# Patient Record
Sex: MALE | HISPANIC OR LATINO | Employment: UNEMPLOYED | URBAN - METROPOLITAN AREA
[De-identification: names, ages, dates, MRNs, and addresses within clinical notes are randomized per-mention and may not be internally consistent; named-entity substitution may affect disease eponyms.]

---

## 2018-04-04 ENCOUNTER — HOSPITAL ENCOUNTER (EMERGENCY)
Facility: MEDICAL CENTER | Age: 33
End: 2018-04-04

## 2018-04-04 ENCOUNTER — APPOINTMENT (OUTPATIENT)
Dept: RADIOLOGY | Facility: MEDICAL CENTER | Age: 33
End: 2018-04-04

## 2018-04-04 VITALS
HEART RATE: 92 BPM | SYSTOLIC BLOOD PRESSURE: 143 MMHG | RESPIRATION RATE: 14 BRPM | BODY MASS INDEX: 30.93 KG/M2 | WEIGHT: 197.09 LBS | OXYGEN SATURATION: 97 % | TEMPERATURE: 97.1 F | HEIGHT: 67 IN | DIASTOLIC BLOOD PRESSURE: 97 MMHG

## 2018-04-04 LAB
ALBUMIN SERPL BCP-MCNC: 4.4 G/DL (ref 3.2–4.9)
ALBUMIN/GLOB SERPL: 1.3 G/DL
ALP SERPL-CCNC: 87 U/L (ref 30–99)
ALT SERPL-CCNC: 50 U/L (ref 2–50)
ANION GAP SERPL CALC-SCNC: 10 MMOL/L (ref 0–11.9)
APTT PPP: 26.5 SEC (ref 24.7–36)
AST SERPL-CCNC: 26 U/L (ref 12–45)
BASOPHILS # BLD AUTO: 0.7 % (ref 0–1.8)
BASOPHILS # BLD: 0.08 K/UL (ref 0–0.12)
BILIRUB SERPL-MCNC: 0.4 MG/DL (ref 0.1–1.5)
BNP SERPL-MCNC: 13 PG/ML (ref 0–100)
BUN SERPL-MCNC: 9 MG/DL (ref 8–22)
CALCIUM SERPL-MCNC: 9.9 MG/DL (ref 8.5–10.5)
CHLORIDE SERPL-SCNC: 106 MMOL/L (ref 96–112)
CO2 SERPL-SCNC: 21 MMOL/L (ref 20–33)
CREAT SERPL-MCNC: 0.89 MG/DL (ref 0.5–1.4)
EKG IMPRESSION: NORMAL
EOSINOPHIL # BLD AUTO: 0.21 K/UL (ref 0–0.51)
EOSINOPHIL NFR BLD: 1.8 % (ref 0–6.9)
ERYTHROCYTE [DISTWIDTH] IN BLOOD BY AUTOMATED COUNT: 43 FL (ref 35.9–50)
GLOBULIN SER CALC-MCNC: 3.3 G/DL (ref 1.9–3.5)
GLUCOSE SERPL-MCNC: 85 MG/DL (ref 65–99)
HCT VFR BLD AUTO: 47.6 % (ref 42–52)
HGB BLD-MCNC: 16.6 G/DL (ref 14–18)
IMM GRANULOCYTES # BLD AUTO: 0.05 K/UL (ref 0–0.11)
IMM GRANULOCYTES NFR BLD AUTO: 0.4 % (ref 0–0.9)
INR PPP: 1 (ref 0.87–1.13)
LIPASE SERPL-CCNC: 30 U/L (ref 11–82)
LYMPHOCYTES # BLD AUTO: 4.34 K/UL (ref 1–4.8)
LYMPHOCYTES NFR BLD: 36.9 % (ref 22–41)
MCH RBC QN AUTO: 32.3 PG (ref 27–33)
MCHC RBC AUTO-ENTMCNC: 34.9 G/DL (ref 33.7–35.3)
MCV RBC AUTO: 92.6 FL (ref 81.4–97.8)
MONOCYTES # BLD AUTO: 0.87 K/UL (ref 0–0.85)
MONOCYTES NFR BLD AUTO: 7.4 % (ref 0–13.4)
NEUTROPHILS # BLD AUTO: 6.21 K/UL (ref 1.82–7.42)
NEUTROPHILS NFR BLD: 52.8 % (ref 44–72)
NRBC # BLD AUTO: 0 K/UL
NRBC BLD-RTO: 0 /100 WBC
PLATELET # BLD AUTO: 285 K/UL (ref 164–446)
PMV BLD AUTO: 12.4 FL (ref 9–12.9)
POTASSIUM SERPL-SCNC: 3.7 MMOL/L (ref 3.6–5.5)
PROT SERPL-MCNC: 7.7 G/DL (ref 6–8.2)
PROTHROMBIN TIME: 12.9 SEC (ref 12–14.6)
RBC # BLD AUTO: 5.14 M/UL (ref 4.7–6.1)
SODIUM SERPL-SCNC: 137 MMOL/L (ref 135–145)
TROPONIN I SERPL-MCNC: <0.01 NG/ML (ref 0–0.04)
WBC # BLD AUTO: 11.8 K/UL (ref 4.8–10.8)

## 2018-04-04 PROCEDURE — 93005 ELECTROCARDIOGRAM TRACING: CPT

## 2018-04-04 PROCEDURE — 85025 COMPLETE CBC W/AUTO DIFF WBC: CPT

## 2018-04-04 PROCEDURE — 80053 COMPREHEN METABOLIC PANEL: CPT

## 2018-04-04 PROCEDURE — 85610 PROTHROMBIN TIME: CPT

## 2018-04-04 PROCEDURE — 302449 STATCHG TRIAGE ONLY (STATISTIC)

## 2018-04-04 PROCEDURE — 85730 THROMBOPLASTIN TIME PARTIAL: CPT

## 2018-04-04 PROCEDURE — 83690 ASSAY OF LIPASE: CPT

## 2018-04-04 PROCEDURE — 84484 ASSAY OF TROPONIN QUANT: CPT

## 2018-04-04 PROCEDURE — 83880 ASSAY OF NATRIURETIC PEPTIDE: CPT

## 2018-04-04 ASSESSMENT — PAIN SCALES - GENERAL: PAINLEVEL_OUTOF10: 6

## 2018-04-05 NOTE — ED TRIAGE NOTES
"  Chief Complaint   Patient presents with   • Chest Pain     Starting a couple hours ago.  Left sided radiating down left arm.    • Blurred Vision     Ambulatory to triage for above.  Patient states the he was unable to take BP medications r/t cost of picking up meds for two days, started taking again today.     Patient does not want ASA r/t gastritis.     Protocol initiated.  Explained wait time and triage process to pt. Pt placed back out in lobby, told to notify ED tech or triage RN of any changes, verbalized understanding.    ./97   Pulse 92   Temp 36.2 °C (97.1 °F)   Resp 14   Ht 1.702 m (5' 7\")   Wt 89.4 kg (197 lb 1.5 oz)   SpO2 97%   BMI 30.87 kg/m²       "

## 2018-04-23 ENCOUNTER — HOSPITAL ENCOUNTER (EMERGENCY)
Facility: MEDICAL CENTER | Age: 33
End: 2018-04-23

## 2018-04-23 VITALS
RESPIRATION RATE: 17 BRPM | OXYGEN SATURATION: 94 % | HEART RATE: 87 BPM | DIASTOLIC BLOOD PRESSURE: 95 MMHG | TEMPERATURE: 98.5 F | HEIGHT: 71 IN | SYSTOLIC BLOOD PRESSURE: 133 MMHG

## 2018-04-23 LAB
ALBUMIN SERPL BCP-MCNC: 4.6 G/DL (ref 3.2–4.9)
ALBUMIN/GLOB SERPL: 1.4 G/DL
ALP SERPL-CCNC: 94 U/L (ref 30–99)
ALT SERPL-CCNC: 48 U/L (ref 2–50)
ANION GAP SERPL CALC-SCNC: 12 MMOL/L (ref 0–11.9)
APPEARANCE UR: CLEAR
AST SERPL-CCNC: 23 U/L (ref 12–45)
BASOPHILS # BLD AUTO: 0.4 % (ref 0–1.8)
BASOPHILS # BLD: 0.06 K/UL (ref 0–0.12)
BILIRUB SERPL-MCNC: 0.5 MG/DL (ref 0.1–1.5)
BILIRUB UR QL STRIP.AUTO: NEGATIVE
BUN SERPL-MCNC: 13 MG/DL (ref 8–22)
CALCIUM SERPL-MCNC: 9.9 MG/DL (ref 8.5–10.5)
CHLORIDE SERPL-SCNC: 108 MMOL/L (ref 96–112)
CO2 SERPL-SCNC: 20 MMOL/L (ref 20–33)
COLOR UR: YELLOW
CREAT SERPL-MCNC: 0.92 MG/DL (ref 0.5–1.4)
EOSINOPHIL # BLD AUTO: 0.15 K/UL (ref 0–0.51)
EOSINOPHIL NFR BLD: 1.1 % (ref 0–6.9)
ERYTHROCYTE [DISTWIDTH] IN BLOOD BY AUTOMATED COUNT: 42.6 FL (ref 35.9–50)
GLOBULIN SER CALC-MCNC: 3.4 G/DL (ref 1.9–3.5)
GLUCOSE SERPL-MCNC: 98 MG/DL (ref 65–99)
GLUCOSE UR STRIP.AUTO-MCNC: NEGATIVE MG/DL
HCT VFR BLD AUTO: 49.7 % (ref 42–52)
HGB BLD-MCNC: 16.9 G/DL (ref 14–18)
IMM GRANULOCYTES # BLD AUTO: 0.07 K/UL (ref 0–0.11)
IMM GRANULOCYTES NFR BLD AUTO: 0.5 % (ref 0–0.9)
INR PPP: 1 (ref 0.87–1.13)
KETONES UR STRIP.AUTO-MCNC: NEGATIVE MG/DL
LEUKOCYTE ESTERASE UR QL STRIP.AUTO: NEGATIVE
LIPASE SERPL-CCNC: 53 U/L (ref 11–82)
LYMPHOCYTES # BLD AUTO: 3.69 K/UL (ref 1–4.8)
LYMPHOCYTES NFR BLD: 27.3 % (ref 22–41)
MCH RBC QN AUTO: 31.5 PG (ref 27–33)
MCHC RBC AUTO-ENTMCNC: 34 G/DL (ref 33.7–35.3)
MCV RBC AUTO: 92.6 FL (ref 81.4–97.8)
MICRO URNS: NORMAL
MONOCYTES # BLD AUTO: 0.87 K/UL (ref 0–0.85)
MONOCYTES NFR BLD AUTO: 6.4 % (ref 0–13.4)
NEUTROPHILS # BLD AUTO: 8.69 K/UL (ref 1.82–7.42)
NEUTROPHILS NFR BLD: 64.3 % (ref 44–72)
NITRITE UR QL STRIP.AUTO: NEGATIVE
NRBC # BLD AUTO: 0 K/UL
NRBC BLD-RTO: 0 /100 WBC
PH UR STRIP.AUTO: 5 [PH]
PLATELET # BLD AUTO: 274 K/UL (ref 164–446)
PMV BLD AUTO: 11.8 FL (ref 9–12.9)
POTASSIUM SERPL-SCNC: 3.8 MMOL/L (ref 3.6–5.5)
PROT SERPL-MCNC: 8 G/DL (ref 6–8.2)
PROT UR QL STRIP: NEGATIVE MG/DL
PROTHROMBIN TIME: 12.9 SEC (ref 12–14.6)
RBC # BLD AUTO: 5.37 M/UL (ref 4.7–6.1)
RBC UR QL AUTO: NEGATIVE
SODIUM SERPL-SCNC: 140 MMOL/L (ref 135–145)
SP GR UR STRIP.AUTO: 1.02
UROBILINOGEN UR STRIP.AUTO-MCNC: 0.2 MG/DL
WBC # BLD AUTO: 13.5 K/UL (ref 4.8–10.8)

## 2018-04-23 PROCEDURE — 81003 URINALYSIS AUTO W/O SCOPE: CPT

## 2018-04-23 PROCEDURE — 302449 STATCHG TRIAGE ONLY (STATISTIC)

## 2018-04-23 PROCEDURE — 85610 PROTHROMBIN TIME: CPT

## 2018-04-23 PROCEDURE — 83690 ASSAY OF LIPASE: CPT

## 2018-04-23 PROCEDURE — 80053 COMPREHEN METABOLIC PANEL: CPT

## 2018-04-23 PROCEDURE — 36415 COLL VENOUS BLD VENIPUNCTURE: CPT

## 2018-04-23 PROCEDURE — 85025 COMPLETE CBC W/AUTO DIFF WBC: CPT

## 2018-04-23 NOTE — ED TRIAGE NOTES
Patient ambulatory to ED triage with complaints of blood in avitia, lightheadedness, fatigue and liver disease. Was told to come to ED by primary MD a few days about but that he was feeling better. States when he spits there has been blood present. Blood in not when he is coughing. He denies n/v and reports no usual abdominal pain. He is alert and awake. Not uncomfortable appearing in triage.     Pt educated on ED process and asked to wait in lobby. Patient educated on importance of alerting staff to new or worsening symptoms or concerns.

## 2018-05-12 ENCOUNTER — APPOINTMENT (OUTPATIENT)
Dept: RADIOLOGY | Facility: MEDICAL CENTER | Age: 33
End: 2018-05-12
Attending: EMERGENCY MEDICINE
Payer: MEDICAID

## 2018-05-12 ENCOUNTER — HOSPITAL ENCOUNTER (EMERGENCY)
Facility: MEDICAL CENTER | Age: 33
End: 2018-05-13
Attending: EMERGENCY MEDICINE
Payer: MEDICAID

## 2018-05-12 DIAGNOSIS — R55 SYNCOPE, UNSPECIFIED SYNCOPE TYPE: ICD-10-CM

## 2018-05-12 DIAGNOSIS — I10 HYPERTENSION, UNSPECIFIED TYPE: ICD-10-CM

## 2018-05-12 LAB
ALBUMIN SERPL BCP-MCNC: 4.6 G/DL (ref 3.2–4.9)
ALBUMIN/GLOB SERPL: 1.8 G/DL
ALP SERPL-CCNC: 85 U/L (ref 30–99)
ALT SERPL-CCNC: 39 U/L (ref 2–50)
AMMONIA PLAS-SCNC: 44 UMOL/L (ref 11–45)
ANION GAP SERPL CALC-SCNC: 10 MMOL/L (ref 0–11.9)
AST SERPL-CCNC: 22 U/L (ref 12–45)
BASOPHILS # BLD AUTO: 0.4 % (ref 0–1.8)
BASOPHILS # BLD: 0.06 K/UL (ref 0–0.12)
BILIRUB SERPL-MCNC: 0.5 MG/DL (ref 0.1–1.5)
BUN SERPL-MCNC: 11 MG/DL (ref 8–22)
CALCIUM SERPL-MCNC: 9.7 MG/DL (ref 8.5–10.5)
CHLORIDE SERPL-SCNC: 108 MMOL/L (ref 96–112)
CO2 SERPL-SCNC: 20 MMOL/L (ref 20–33)
CREAT SERPL-MCNC: 0.96 MG/DL (ref 0.5–1.4)
CRP SERPL HS-MCNC: 0.29 MG/DL (ref 0–0.75)
DEPRECATED D DIMER PPP IA-ACNC: <200 NG/ML(D-DU)
EKG IMPRESSION: NORMAL
EOSINOPHIL # BLD AUTO: 0.07 K/UL (ref 0–0.51)
EOSINOPHIL NFR BLD: 0.5 % (ref 0–6.9)
ERYTHROCYTE [DISTWIDTH] IN BLOOD BY AUTOMATED COUNT: 41.1 FL (ref 35.9–50)
GLOBULIN SER CALC-MCNC: 2.6 G/DL (ref 1.9–3.5)
GLUCOSE SERPL-MCNC: 125 MG/DL (ref 65–99)
HCT VFR BLD AUTO: 44.8 % (ref 42–52)
HGB BLD-MCNC: 15.3 G/DL (ref 14–18)
IMM GRANULOCYTES # BLD AUTO: 0.07 K/UL (ref 0–0.11)
IMM GRANULOCYTES NFR BLD AUTO: 0.5 % (ref 0–0.9)
LIPASE SERPL-CCNC: 54 U/L (ref 11–82)
LYMPHOCYTES # BLD AUTO: 2.09 K/UL (ref 1–4.8)
LYMPHOCYTES NFR BLD: 14.6 % (ref 22–41)
MCH RBC QN AUTO: 31 PG (ref 27–33)
MCHC RBC AUTO-ENTMCNC: 34.2 G/DL (ref 33.7–35.3)
MCV RBC AUTO: 90.9 FL (ref 81.4–97.8)
MONOCYTES # BLD AUTO: 0.71 K/UL (ref 0–0.85)
MONOCYTES NFR BLD AUTO: 4.9 % (ref 0–13.4)
NEUTROPHILS # BLD AUTO: 11.35 K/UL (ref 1.82–7.42)
NEUTROPHILS NFR BLD: 79.1 % (ref 44–72)
NRBC # BLD AUTO: 0 K/UL
NRBC BLD-RTO: 0 /100 WBC
PLATELET # BLD AUTO: 251 K/UL (ref 164–446)
PMV BLD AUTO: 12.3 FL (ref 9–12.9)
POTASSIUM SERPL-SCNC: 3.4 MMOL/L (ref 3.6–5.5)
PROT SERPL-MCNC: 7.2 G/DL (ref 6–8.2)
RBC # BLD AUTO: 4.93 M/UL (ref 4.7–6.1)
SODIUM SERPL-SCNC: 138 MMOL/L (ref 135–145)
TROPONIN I SERPL-MCNC: <0.01 NG/ML (ref 0–0.04)
WBC # BLD AUTO: 14.4 K/UL (ref 4.8–10.8)

## 2018-05-12 PROCEDURE — 71045 X-RAY EXAM CHEST 1 VIEW: CPT

## 2018-05-12 PROCEDURE — 84484 ASSAY OF TROPONIN QUANT: CPT

## 2018-05-12 PROCEDURE — 85379 FIBRIN DEGRADATION QUANT: CPT

## 2018-05-12 PROCEDURE — 93005 ELECTROCARDIOGRAM TRACING: CPT | Performed by: EMERGENCY MEDICINE

## 2018-05-12 PROCEDURE — 82140 ASSAY OF AMMONIA: CPT

## 2018-05-12 PROCEDURE — 99285 EMERGENCY DEPT VISIT HI MDM: CPT

## 2018-05-12 PROCEDURE — 93005 ELECTROCARDIOGRAM TRACING: CPT

## 2018-05-12 PROCEDURE — 700105 HCHG RX REV CODE 258: Performed by: EMERGENCY MEDICINE

## 2018-05-12 PROCEDURE — 85025 COMPLETE CBC W/AUTO DIFF WBC: CPT

## 2018-05-12 PROCEDURE — 36415 COLL VENOUS BLD VENIPUNCTURE: CPT

## 2018-05-12 PROCEDURE — 80053 COMPREHEN METABOLIC PANEL: CPT

## 2018-05-12 PROCEDURE — 83690 ASSAY OF LIPASE: CPT

## 2018-05-12 PROCEDURE — 96360 HYDRATION IV INFUSION INIT: CPT

## 2018-05-12 PROCEDURE — 85652 RBC SED RATE AUTOMATED: CPT

## 2018-05-12 PROCEDURE — 86140 C-REACTIVE PROTEIN: CPT

## 2018-05-12 RX ORDER — SODIUM CHLORIDE 9 MG/ML
1000 INJECTION, SOLUTION INTRAVENOUS ONCE
Status: COMPLETED | OUTPATIENT
Start: 2018-05-12 | End: 2018-05-12

## 2018-05-12 RX ORDER — LACTULOSE 10 G/15ML
20 SOLUTION ORAL 2 TIMES DAILY
COMMUNITY
End: 2019-05-21 | Stop reason: CLARIF

## 2018-05-12 RX ORDER — SERTRALINE HYDROCHLORIDE 100 MG/1
100 TABLET, FILM COATED ORAL DAILY
COMMUNITY
End: 2019-05-21 | Stop reason: CLARIF

## 2018-05-12 RX ORDER — QUETIAPINE FUMARATE 100 MG/1
100 TABLET, FILM COATED ORAL 2 TIMES DAILY
COMMUNITY
End: 2019-05-21 | Stop reason: CLARIF

## 2018-05-12 RX ORDER — GABAPENTIN 300 MG/1
300 CAPSULE ORAL 3 TIMES DAILY
COMMUNITY
End: 2019-05-21 | Stop reason: CLARIF

## 2018-05-12 RX ADMIN — SODIUM CHLORIDE 1000 ML: 9 INJECTION, SOLUTION INTRAVENOUS at 22:28

## 2018-05-12 ASSESSMENT — LIFESTYLE VARIABLES: DO YOU DRINK ALCOHOL: NO

## 2018-05-12 ASSESSMENT — PAIN SCALES - GENERAL: PAINLEVEL_OUTOF10: 3

## 2018-05-13 VITALS
TEMPERATURE: 98.1 F | RESPIRATION RATE: 18 BRPM | BODY MASS INDEX: 29.76 KG/M2 | WEIGHT: 189.6 LBS | OXYGEN SATURATION: 99 % | HEIGHT: 67 IN | HEART RATE: 94 BPM

## 2018-05-13 LAB — ERYTHROCYTE [SEDIMENTATION RATE] IN BLOOD BY WESTERGREN METHOD: 9 MM/HOUR (ref 0–15)

## 2018-05-13 NOTE — ED PROVIDER NOTES
ED Provider Note    Scribed for Dannie Joya M.D. by Emmett Cruz. 5/12/2018, 10:18 PM.    Primary care provider: None  Means of arrival: ambulance  History obtained from: Patient  History limited by: none    CHIEF COMPLAINT  Chief Complaint   Patient presents with   • Syncope   • GLF       HPI  Leo Coelho is a 32 y.o. male who presents to the Emergency Department for evaluation of syncopal episode that occurred while the patient was at work. Per patient, he works as a  at [employer]. The patient reports he was feeling dizzy all day today. He reports his dizziness was exacerbated by walking. He states he was in the bathroom stall today, and then he remembers waking up on the floor. Patient reports he now has an associated headache from hitting his head on the floor and he is still mildly dizzy. He states his headache has been constant since the incident. He does not note any exacerbating or alleviating factors. He denies chest pain, shortness of breath, nausea, vomiting or diarrhea. Patient states he has been drinking a significant amount of water because he gets dry mouth from his daily medications. He notes he was started on Zoloft and Seroquel for his anxiety two days ago. Patient also confirms a history of alcoholic cirrhosis, asthma, hypertension, and gastritis. He states he is currently taking Lisinopril for his hypertension.     REVIEW OF SYSTEMS  See HPI for further details. All other systems are negative. C.     PAST MEDICAL HISTORY   has a past medical history of Alcohol abuse; Alcoholic cirrhosis (HCC); Anxiety; Asthma; Depression; Hypertension; Liver failure (HCC); and Pancreatitis.    SURGICAL HISTORY   has a past surgical history that includes appendectomy.    SOCIAL HISTORY  Social History   Substance Use Topics   • Smoking status: Former Smoker     Types: Cigarettes   • Smokeless tobacco: Never Used   • Alcohol use No      Comment: sober x 3 months      History   Drug Use   • Types:  "Inhaled     Comment: marijuana daily       FAMILY HISTORY  History reviewed. No pertinent family history.    CURRENT MEDICATIONS  Reviewed.  See Encounter Summary.     ALLERGIES  No Known Allergies    PHYSICAL EXAM  VITAL SIGNS: Pulse (!) 104   Temp 36.7 °C (98.1 °F)   Resp 14   Ht 1.702 m (5' 7\")   Wt 86 kg (189 lb 9.5 oz)   SpO2 98%   BMI 29.69 kg/m²   Constitutional: Alert in no apparent distress.  HENT: No signs of trauma, Bilateral external ears normal, Nose normal. Slightly dry mucous membranes.  Eyes: Pupils are equal and reactive, Conjunctiva normal, Non-icteric.   Neck: Normal range of motion, No tenderness, Supple, No stridor.   Lymphatic: No lymphadenopathy noted.   Cardiovascular: Tachycardic. Regular rhythm, no murmurs.   Thorax & Lungs: Normal breath sounds, No respiratory distress, No wheezing, No chest tenderness.   Abdomen: Bowel sounds normal, Soft, No tenderness, No masses, No pulsatile masses. No peritoneal signs.  Skin: Warm, Dry, No erythema, No rash.   Back: No bony tenderness, No CVA tenderness.   Extremities: Intact distal pulses, No edema, No tenderness, No cyanosis  Musculoskeletal: Good range of motion in all major joints. No tenderness to palpation or major deformities noted.   Neurologic: Alert , Normal motor function, Normal sensory function, No focal deficits noted.   Psychiatric: Affect normal, Judgment normal, Mood normal.     DIAGNOSTIC STUDIES / PROCEDURES     LABS  Results for orders placed or performed during the hospital encounter of 05/12/18   CBC w/ Differential   Result Value Ref Range    WBC 14.4 (H) 4.8 - 10.8 K/uL    RBC 4.93 4.70 - 6.10 M/uL    Hemoglobin 15.3 14.0 - 18.0 g/dL    Hematocrit 44.8 42.0 - 52.0 %    MCV 90.9 81.4 - 97.8 fL    MCH 31.0 27.0 - 33.0 pg    MCHC 34.2 33.7 - 35.3 g/dL    RDW 41.1 35.9 - 50.0 fL    Platelet Count 251 164 - 446 K/uL    MPV 12.3 9.0 - 12.9 fL    Neutrophils-Polys 79.10 (H) 44.00 - 72.00 %    Lymphocytes 14.60 (L) 22.00 - 41.00 " %    Monocytes 4.90 0.00 - 13.40 %    Eosinophils 0.50 0.00 - 6.90 %    Basophils 0.40 0.00 - 1.80 %    Immature Granulocytes 0.50 0.00 - 0.90 %    Nucleated RBC 0.00 /100 WBC    Neutrophils (Absolute) 11.35 (H) 1.82 - 7.42 K/uL    Lymphs (Absolute) 2.09 1.00 - 4.80 K/uL    Monos (Absolute) 0.71 0.00 - 0.85 K/uL    Eos (Absolute) 0.07 0.00 - 0.51 K/uL    Baso (Absolute) 0.06 0.00 - 0.12 K/uL    Immature Granulocytes (abs) 0.07 0.00 - 0.11 K/uL    NRBC (Absolute) 0.00 K/uL   Complete Metabolic Panel (CMP)   Result Value Ref Range    Sodium 138 135 - 145 mmol/L    Potassium 3.4 (L) 3.6 - 5.5 mmol/L    Chloride 108 96 - 112 mmol/L    Co2 20 20 - 33 mmol/L    Anion Gap 10.0 0.0 - 11.9    Glucose 125 (H) 65 - 99 mg/dL    Bun 11 8 - 22 mg/dL    Creatinine 0.96 0.50 - 1.40 mg/dL    Calcium 9.7 8.5 - 10.5 mg/dL    AST(SGOT) 22 12 - 45 U/L    ALT(SGPT) 39 2 - 50 U/L    Alkaline Phosphatase 85 30 - 99 U/L    Total Bilirubin 0.5 0.1 - 1.5 mg/dL    Albumin 4.6 3.2 - 4.9 g/dL    Total Protein 7.2 6.0 - 8.2 g/dL    Globulin 2.6 1.9 - 3.5 g/dL    A-G Ratio 1.8 g/dL   Troponin STAT   Result Value Ref Range    Troponin I <0.01 0.00 - 0.04 ng/mL   Lipase   Result Value Ref Range    Lipase 54 11 - 82 U/L   D-Dimer (only helpful in low pre-test probability wells critieria. Do not order if patient ruled out by PERC criteria. See Weblinks at top of Labs section)   Result Value Ref Range    D-Dimer Screen <200 <250 ng/mL(D-DU)   AMMONIA   Result Value Ref Range    Ammonia 44 11 - 45 umol/L   CRP QUANTITIVE (NON-CARDIAC)   Result Value Ref Range    Stat C-Reactive Protein 0.29 0.00 - 0.75 mg/dL   ESTIMATED GFR   Result Value Ref Range    GFR If African American >60 >60 mL/min/1.73 m 2    GFR If Non African American >60 >60 mL/min/1.73 m 2   EKG (IP)   Result Value Ref Range    Report       [facility] Emergency Dept.    Test Date:  2018-05-12  Pt Name:    JACKIE HARE                 Department: ER  MRN:        [id number]                       Room:        09  Gender:     M                            Technician: 000  :        [id number]                   Requested By:ER TRIAGE PROTOCOL  Order #:    [id number]                    Reading MD:    Measurements  Intervals                                Axis  Rate:       92                           P:          20  MA:         128                          QRS:        63  QRSD:       76                           T:          27  QT:         352  QTc:        436    Interpretive Statements  SINUS RHYTHM  ST ELEVATION SUGGESTS PERICARDITIS  No previous ECG available for comparison        All labs were reviewed by me.    EKG  12 Lead EKG interpreted by me to show:  Sinus rhythm  Rate 92  Baseline artifact  Axis: Normal  Intervals: Normal  Minimal inferior MA depressions in III and aVF  Nonspecific ST elevation in V1, V2, and V3  No prior EKG for comparison    EKG  Performed at 23:55  12 Lead EKG interpreted by me to show:  Sinus rhythm  Rate 84  Axis: Normal  Intervals: Normal  Nonspecific minimal precordial ST elevation and subtle, questionable MA depression. No evidence of Brugada or WPW.          RADIOLOGY  DX-CHEST-PORTABLE (1 VIEW)   Final Result         1. No acute cardiopulmonary abnormalities are identified.        The radiologist's interpretation of all radiological studies and images have been reviewed by me.    COURSE & MEDICAL DECISION MAKING  Pertinent Labs & Imaging studies reviewed. (See chart for details)      10:18 PM - Patient seen and examined at bedside. Patient will be treated with NS infusion 1000 mL. The patient is treated with IV fluids secondary to tachycardia and slightly dry mucous membranes. Ordered chest x-ray, CRP, ammonia, CBC with differential, CMP, Troponin, Lipase, D-dimer, EKG to evaluate his symptoms. Discussed the plan of care with the patient. He understood and verbalized agreement.      11:50 PM - Reevaluated the patient at bedside. The patient is feeling  "improved after resuscitation with IV fluids and he is no longer dizzy. Updated the patient on his lab and imaging results as above. Ordered repeat EKG to further evaluate the patient prior to discharge.     12:17 AM - Updated the patient on his repeat EKG. The patient was advised to follow-up with cardiology as soon as possible. He was given return precautions and welcomed to return to the ED with new or worsening symptoms. The patient understood and verbalized agreement.          Decision Making:  This is a 32 y.o. year old male who presents with syncopal episode and recent dizziness. Patient has recently been started on some new medications as described above. It may certainly be a side effect of his medications that he is experiencing. He has a he has had recurrent bouts of dizziness however today was the 1st time that he had a full syncopal episode. Initial EKG did have some baseline artifacts and this was repeated in an interval time during the completion of the patient's ER observation. There were some subtle abnormalities although the patient reports that he has had \"abnormal EKGs \"in the past although he is not aware of the specific abnormal findings. This point do not see any concerning morphologic or conductive changes to otherwise require further cardiac murmur to consultation. I have however strongly encouraged him to follow-up at local clinic as well as with cardiology as discussed. He is also an attending return precautions should his dizziness worsen or change or if he has any recurrent syncopal episodes. Additionally have asked him to talk to his primary care provider about his new medications which may be causing some of the symptoms. Lastly he does have elevated blood pressure throughout his ER stay and have also encouraged with his primary care physician about ongoing management of this.    The patient will return for new or worsening symptoms and is stable at the time of " discharge.    DISPOSITION:  Patient will be discharged home in stable condition.    FOLLOW UP:  RADHA LEMUS  [address] [address]  [phone]  Schedule an appointment as soon as possible for a visit          FINAL IMPRESSION  1. Syncope, unspecified syncope type    2. Hypertension, unspecified type          I, Emmett Cruz (Scribe), am scribing for, and in the presence of, Dannie Joya M.D..    Electronically signed by: Emmett Cruz (Scribe), 5/12/2018    IDannie M.D. personally performed the services described in this documentation, as scribed by Emmett Cruz in my presence, and it is both accurate and complete.    The note accurately reflects work and decisions made by me.  Dannie Joya  5/13/2018  7:37 PM

## 2018-05-13 NOTE — DISCHARGE INSTRUCTIONS
Hypertension  Hypertension, commonly called high blood pressure, is when the force of blood pumping through your arteries is too strong. Your arteries are the blood vessels that carry blood from your heart throughout your body. A blood pressure reading consists of a higher number over a lower number, such as 110/72. The higher number (systolic) is the pressure inside your arteries when your heart pumps. The lower number (diastolic) is the pressure inside your arteries when your heart relaxes. Ideally you want your blood pressure below 120/80.  Hypertension forces your heart to work harder to pump blood. Your arteries may become narrow or stiff. Having untreated or uncontrolled hypertension can cause heart attack, stroke, kidney disease, and other problems.  What increases the risk?  Some risk factors for high blood pressure are controllable. Others are not.  Risk factors you cannot control include:  · Race. You may be at higher risk if you are .  · Age. Risk increases with age.  · Gender. Men are at higher risk than women before age 45 years. After age 65, women are at higher risk than men.  Risk factors you can control include:  · Not getting enough exercise or physical activity.  · Being overweight.  · Getting too much fat, sugar, calories, or salt in your diet.  · Drinking too much alcohol.  What are the signs or symptoms?  Hypertension does not usually cause signs or symptoms. Extremely high blood pressure (hypertensive crisis) may cause headache, anxiety, shortness of breath, and nosebleed.  How is this diagnosed?  To check if you have hypertension, your health care provider will measure your blood pressure while you are seated, with your arm held at the level of your heart. It should be measured at least twice using the same arm. Certain conditions can cause a difference in blood pressure between your right and left arms. A blood pressure reading that is higher than normal on one occasion does  not mean that you need treatment. If it is not clear whether you have high blood pressure, you may be asked to return on a different day to have your blood pressure checked again. Or, you may be asked to monitor your blood pressure at home for 1 or more weeks.  How is this treated?  Treating high blood pressure includes making lifestyle changes and possibly taking medicine. Living a healthy lifestyle can help lower high blood pressure. You may need to change some of your habits.  Lifestyle changes may include:  · Following the DASH diet. This diet is high in fruits, vegetables, and whole grains. It is low in salt, red meat, and added sugars.  · Keep your sodium intake below 2,300 mg per day.  · Getting at least 30-45 minutes of aerobic exercise at least 4 times per week.  · Losing weight if necessary.  · Not smoking.  · Limiting alcoholic beverages.  · Learning ways to reduce stress.  Your health care provider may prescribe medicine if lifestyle changes are not enough to get your blood pressure under control, and if one of the following is true:  · You are 18-59 years of age and your systolic blood pressure is above 140.  · You are 60 years of age or older, and your systolic blood pressure is above 150.  · Your diastolic blood pressure is above 90.  · You have diabetes, and your systolic blood pressure is over 140 or your diastolic blood pressure is over 90.  · You have kidney disease and your blood pressure is above 140/90.  · You have heart disease and your blood pressure is above 140/90.  Your personal target blood pressure may vary depending on your medical conditions, your age, and other factors.  Follow these instructions at home:  · Have your blood pressure rechecked as directed by your health care provider.  · Take medicines only as directed by your health care provider. Follow the directions carefully. Blood pressure medicines must be taken as prescribed. The medicine does not work as well when you skip  doses. Skipping doses also puts you at risk for problems.  · Do not smoke.  · Monitor your blood pressure at home as directed by your health care provider.  Contact a health care provider if:  · You think you are having a reaction to medicines taken.  · You have recurrent headaches or feel dizzy.  · You have swelling in your ankles.  · You have trouble with your vision.  Get help right away if:  · You develop a severe headache or confusion.  · You have unusual weakness, numbness, or feel faint.  · You have severe chest or abdominal pain.  · You vomit repeatedly.  · You have trouble breathing.  This information is not intended to replace advice given to you by your health care provider. Make sure you discuss any questions you have with your health care provider.  Document Released: 12/18/2006 Document Revised: 05/25/2017 Document Reviewed: 10/10/2014  [employer] Interactive Patient Education © 2017 [employer]      Syncope  Syncope is when you temporarily lose consciousness. Syncope may also be called fainting or passing out. It is caused by a sudden decrease in blood flow to the brain. Even though most causes of syncope are not dangerous, syncope can be a sign of a serious medical problem. Signs that you may be about to faint include:  · Feeling dizzy or light-headed.  · Feeling nauseous.  · Seeing all white or all black in your field of vision.  · Having cold, clammy skin.  If you fainted, get medical help right away.Call your local emergency services (911 in the [place] Do not drive yourself to the hospital.  Follow these instructions at home:  Pay attention to any changes in your symptoms. Take these actions to help with your condition:  · Have someone stay with you until you feel stable.  · Do not drive, use machinery, or play sports until your health care provider says it is okay.  · Keep all follow-up visits as told by your health care provider. This is important.  · If you start to feel like you might faint,  lie down right away and raise (elevate) your feet above the level of your heart. Breathe deeply and steadily. Wait until all of the symptoms have passed.  · Drink enough fluid to keep your urine clear or pale yellow.  · If you are taking blood pressure or heart medicine, get up slowly and take several minutes to sit and then stand. This can reduce dizziness.  · Take over-the-counter and prescription medicines only as told by your health care provider.  Get help right away if:  · You have a severe headache.  · You have unusual pain in your chest, abdomen, or back.  · You are bleeding from your mouth or rectum, or you have black or tarry stool.  · You have a very fast or irregular heartbeat (palpitations).  · You have pain with breathing.  · You faint once or repeatedly.  · You have a seizure.  · You are confused.  · You have trouble walking.  · You have severe weakness.  · You have vision problems.  These symptoms may represent a serious problem that is an emergency. Do not wait to see if your symptoms will go away. Get medical help right away. Call your local emergency services (911 in the [place] Do not drive yourself to the hospital.   This information is not intended to replace advice given to you by your health care provider. Make sure you discuss any questions you have with your health care provider.  Document Released: 12/18/2006 Document Revised: 05/25/2017 Document Reviewed: 08/31/2016  [employer] Interactive Patient Education © 2017 [employer]

## 2018-05-13 NOTE — ED NOTES
[facility] fall assessment completed. Pt is High risk for fall. Interventions complete. Pt placed in yellow non slip socks, wrist band placed, green sign on door. Bed locked in low position, call light in place. Personal possessions in place.  Personal needs assessed. Charge nurse notified to move pt to a more visible room if available. Safety assessed. Will monitor frequently

## 2018-05-13 NOTE — ED TRIAGE NOTES
Leo Coelho  32 y.o.  Chief Complaint   Patient presents with   • Syncope   • GLF     BIB EMS for above. Patient was dizzy all day at work. Was standing in the BR waiting for a stall and passed out, got woken up on the bathroom floor. Complaining of R posterior head pain 3/10.    Patient states that he was started on a new medication 2 days ago, unable to remember name of medication. States that he thinks it is an anti-anxiety/anti-depressant/anti-psychotic.    A & O x 4, GCS 15. Complaining of increased blurred vision today, states that his vision is normally somewhat blurry.    Hx. HTN, alcoholic cirrhosis, sober x 3 months    EKG at bedside per protocol.

## 2018-05-13 NOTE — ED NOTES
Patient discharged in stable condition per orders. IV access removed - bandage applied. Wristband removed per protocol. Patient verbalized understanding of all discharge instructed. All belongings accounted for. Ambulatory to lobby with steady gait accompanied by family.

## 2018-06-05 ENCOUNTER — HOSPITAL ENCOUNTER (EMERGENCY)
Facility: MEDICAL CENTER | Age: 33
End: 2018-06-06
Attending: EMERGENCY MEDICINE
Payer: MEDICAID

## 2018-06-05 ENCOUNTER — APPOINTMENT (OUTPATIENT)
Dept: RADIOLOGY | Facility: MEDICAL CENTER | Age: 33
End: 2018-06-05
Attending: EMERGENCY MEDICINE
Payer: MEDICAID

## 2018-06-05 DIAGNOSIS — R20.2 TINGLING IN EXTREMITIES: ICD-10-CM

## 2018-06-05 DIAGNOSIS — R07.9 CHEST PAIN, UNSPECIFIED TYPE: ICD-10-CM

## 2018-06-05 LAB
ALBUMIN SERPL BCP-MCNC: 4.4 G/DL (ref 3.2–4.9)
ALBUMIN/GLOB SERPL: 1.4 G/DL
ALP SERPL-CCNC: 85 U/L (ref 30–99)
ALT SERPL-CCNC: 24 U/L (ref 2–50)
ANION GAP SERPL CALC-SCNC: 12 MMOL/L (ref 0–11.9)
AST SERPL-CCNC: 16 U/L (ref 12–45)
BASOPHILS # BLD AUTO: 0.3 % (ref 0–1.8)
BASOPHILS # BLD: 0.04 K/UL (ref 0–0.12)
BILIRUB SERPL-MCNC: 0.4 MG/DL (ref 0.1–1.5)
BUN SERPL-MCNC: 11 MG/DL (ref 8–22)
CALCIUM SERPL-MCNC: 9.6 MG/DL (ref 8.5–10.5)
CHLORIDE SERPL-SCNC: 106 MMOL/L (ref 96–112)
CO2 SERPL-SCNC: 22 MMOL/L (ref 20–33)
CREAT SERPL-MCNC: 0.89 MG/DL (ref 0.5–1.4)
EKG IMPRESSION: NORMAL
EOSINOPHIL # BLD AUTO: 0.05 K/UL (ref 0–0.51)
EOSINOPHIL NFR BLD: 0.4 % (ref 0–6.9)
ERYTHROCYTE [DISTWIDTH] IN BLOOD BY AUTOMATED COUNT: 43.7 FL (ref 35.9–50)
GLOBULIN SER CALC-MCNC: 3.1 G/DL (ref 1.9–3.5)
GLUCOSE SERPL-MCNC: 106 MG/DL (ref 65–99)
HCT VFR BLD AUTO: 42.9 % (ref 42–52)
HGB BLD-MCNC: 14.8 G/DL (ref 14–18)
IMM GRANULOCYTES # BLD AUTO: 0.06 K/UL (ref 0–0.11)
IMM GRANULOCYTES NFR BLD AUTO: 0.5 % (ref 0–0.9)
LYMPHOCYTES # BLD AUTO: 3.08 K/UL (ref 1–4.8)
LYMPHOCYTES NFR BLD: 25.2 % (ref 22–41)
MCH RBC QN AUTO: 31.6 PG (ref 27–33)
MCHC RBC AUTO-ENTMCNC: 34.5 G/DL (ref 33.7–35.3)
MCV RBC AUTO: 91.5 FL (ref 81.4–97.8)
MONOCYTES # BLD AUTO: 0.68 K/UL (ref 0–0.85)
MONOCYTES NFR BLD AUTO: 5.6 % (ref 0–13.4)
NEUTROPHILS # BLD AUTO: 8.29 K/UL (ref 1.82–7.42)
NEUTROPHILS NFR BLD: 68 % (ref 44–72)
NRBC # BLD AUTO: 0 K/UL
NRBC BLD-RTO: 0 /100 WBC
PLATELET # BLD AUTO: 249 K/UL (ref 164–446)
PMV BLD AUTO: 11.6 FL (ref 9–12.9)
POTASSIUM SERPL-SCNC: 3.4 MMOL/L (ref 3.6–5.5)
PROT SERPL-MCNC: 7.5 G/DL (ref 6–8.2)
RBC # BLD AUTO: 4.69 M/UL (ref 4.7–6.1)
SODIUM SERPL-SCNC: 140 MMOL/L (ref 135–145)
TROPONIN I SERPL-MCNC: <0.01 NG/ML (ref 0–0.04)
WBC # BLD AUTO: 12.2 K/UL (ref 4.8–10.8)

## 2018-06-05 PROCEDURE — 93005 ELECTROCARDIOGRAM TRACING: CPT

## 2018-06-05 PROCEDURE — 99284 EMERGENCY DEPT VISIT MOD MDM: CPT

## 2018-06-05 PROCEDURE — 84484 ASSAY OF TROPONIN QUANT: CPT

## 2018-06-05 PROCEDURE — 80053 COMPREHEN METABOLIC PANEL: CPT

## 2018-06-05 PROCEDURE — 82553 CREATINE MB FRACTION: CPT

## 2018-06-05 PROCEDURE — 93005 ELECTROCARDIOGRAM TRACING: CPT | Performed by: EMERGENCY MEDICINE

## 2018-06-05 PROCEDURE — A9270 NON-COVERED ITEM OR SERVICE: HCPCS | Performed by: EMERGENCY MEDICINE

## 2018-06-05 PROCEDURE — 700102 HCHG RX REV CODE 250 W/ 637 OVERRIDE(OP): Performed by: EMERGENCY MEDICINE

## 2018-06-05 PROCEDURE — 85025 COMPLETE CBC W/AUTO DIFF WBC: CPT

## 2018-06-05 PROCEDURE — 71046 X-RAY EXAM CHEST 2 VIEWS: CPT

## 2018-06-05 RX ORDER — LORAZEPAM 1 MG/1
0.5 TABLET ORAL ONCE
Status: COMPLETED | OUTPATIENT
Start: 2018-06-05 | End: 2018-06-05

## 2018-06-05 RX ADMIN — LORAZEPAM 0.5 MG: 1 TABLET ORAL at 23:06

## 2018-06-05 ASSESSMENT — PAIN SCALES - GENERAL: PAINLEVEL_OUTOF10: 2

## 2018-06-06 ENCOUNTER — HOSPITAL ENCOUNTER (OUTPATIENT)
Facility: MEDICAL CENTER | Age: 33
End: 2018-06-08
Attending: EMERGENCY MEDICINE | Admitting: HOSPITALIST
Payer: MEDICAID

## 2018-06-06 ENCOUNTER — APPOINTMENT (OUTPATIENT)
Dept: RADIOLOGY | Facility: MEDICAL CENTER | Age: 33
End: 2018-06-06
Attending: EMERGENCY MEDICINE
Payer: MEDICAID

## 2018-06-06 VITALS
DIASTOLIC BLOOD PRESSURE: 82 MMHG | TEMPERATURE: 97.7 F | SYSTOLIC BLOOD PRESSURE: 121 MMHG | RESPIRATION RATE: 16 BRPM | HEART RATE: 77 BPM | OXYGEN SATURATION: 96 % | BODY MASS INDEX: 30.1 KG/M2 | HEIGHT: 67 IN | WEIGHT: 191.8 LBS

## 2018-06-06 DIAGNOSIS — R07.9 CHEST PAIN, UNSPECIFIED TYPE: ICD-10-CM

## 2018-06-06 LAB
ALBUMIN SERPL BCP-MCNC: 4.4 G/DL (ref 3.2–4.9)
ALBUMIN/GLOB SERPL: 1.5 G/DL
ALP SERPL-CCNC: 88 U/L (ref 30–99)
ALT SERPL-CCNC: 23 U/L (ref 2–50)
ANION GAP SERPL CALC-SCNC: 9 MMOL/L (ref 0–11.9)
AST SERPL-CCNC: 15 U/L (ref 12–45)
BASOPHILS # BLD AUTO: 0.5 % (ref 0–1.8)
BASOPHILS # BLD: 0.06 K/UL (ref 0–0.12)
BILIRUB SERPL-MCNC: 0.4 MG/DL (ref 0.1–1.5)
BUN SERPL-MCNC: 12 MG/DL (ref 8–22)
CALCIUM SERPL-MCNC: 9.4 MG/DL (ref 8.5–10.5)
CHLORIDE SERPL-SCNC: 107 MMOL/L (ref 96–112)
CK MB SERPL-MCNC: 1.9 NG/ML (ref 0–5)
CO2 SERPL-SCNC: 21 MMOL/L (ref 20–33)
CREAT SERPL-MCNC: 0.84 MG/DL (ref 0.5–1.4)
DEPRECATED D DIMER PPP IA-ACNC: <200 NG/ML(D-DU)
EOSINOPHIL # BLD AUTO: 0.18 K/UL (ref 0–0.51)
EOSINOPHIL NFR BLD: 1.6 % (ref 0–6.9)
ERYTHROCYTE [DISTWIDTH] IN BLOOD BY AUTOMATED COUNT: 44.5 FL (ref 35.9–50)
GLOBULIN SER CALC-MCNC: 3 G/DL (ref 1.9–3.5)
GLUCOSE SERPL-MCNC: 88 MG/DL (ref 65–99)
HCT VFR BLD AUTO: 46.1 % (ref 42–52)
HGB BLD-MCNC: 15.5 G/DL (ref 14–18)
IMM GRANULOCYTES # BLD AUTO: 0.05 K/UL (ref 0–0.11)
IMM GRANULOCYTES NFR BLD AUTO: 0.4 % (ref 0–0.9)
LYMPHOCYTES # BLD AUTO: 4.14 K/UL (ref 1–4.8)
LYMPHOCYTES NFR BLD: 36.4 % (ref 22–41)
MCH RBC QN AUTO: 31.3 PG (ref 27–33)
MCHC RBC AUTO-ENTMCNC: 33.6 G/DL (ref 33.7–35.3)
MCV RBC AUTO: 93.1 FL (ref 81.4–97.8)
MONOCYTES # BLD AUTO: 0.82 K/UL (ref 0–0.85)
MONOCYTES NFR BLD AUTO: 7.2 % (ref 0–13.4)
NEUTROPHILS # BLD AUTO: 6.12 K/UL (ref 1.82–7.42)
NEUTROPHILS NFR BLD: 53.9 % (ref 44–72)
NRBC # BLD AUTO: 0 K/UL
NRBC BLD-RTO: 0 /100 WBC
PLATELET # BLD AUTO: 278 K/UL (ref 164–446)
PMV BLD AUTO: 11.8 FL (ref 9–12.9)
POTASSIUM SERPL-SCNC: 4 MMOL/L (ref 3.6–5.5)
PROT SERPL-MCNC: 7.4 G/DL (ref 6–8.2)
RBC # BLD AUTO: 4.95 M/UL (ref 4.7–6.1)
SODIUM SERPL-SCNC: 137 MMOL/L (ref 135–145)
TROPONIN I SERPL-MCNC: <0.01 NG/ML (ref 0–0.04)
WBC # BLD AUTO: 11.4 K/UL (ref 4.8–10.8)

## 2018-06-06 PROCEDURE — G0378 HOSPITAL OBSERVATION PER HR: HCPCS

## 2018-06-06 PROCEDURE — 93005 ELECTROCARDIOGRAM TRACING: CPT | Performed by: EMERGENCY MEDICINE

## 2018-06-06 PROCEDURE — 85025 COMPLETE CBC W/AUTO DIFF WBC: CPT

## 2018-06-06 PROCEDURE — A9270 NON-COVERED ITEM OR SERVICE: HCPCS | Performed by: EMERGENCY MEDICINE

## 2018-06-06 PROCEDURE — 700102 HCHG RX REV CODE 250 W/ 637 OVERRIDE(OP): Performed by: EMERGENCY MEDICINE

## 2018-06-06 PROCEDURE — 36415 COLL VENOUS BLD VENIPUNCTURE: CPT

## 2018-06-06 PROCEDURE — 99220 PR INITIAL OBSERVATION CARE,LEVL III: CPT | Performed by: HOSPITALIST

## 2018-06-06 PROCEDURE — 99285 EMERGENCY DEPT VISIT HI MDM: CPT

## 2018-06-06 PROCEDURE — 85379 FIBRIN DEGRADATION QUANT: CPT

## 2018-06-06 PROCEDURE — 84484 ASSAY OF TROPONIN QUANT: CPT

## 2018-06-06 PROCEDURE — 80053 COMPREHEN METABOLIC PANEL: CPT

## 2018-06-06 PROCEDURE — 71045 X-RAY EXAM CHEST 1 VIEW: CPT

## 2018-06-06 RX ORDER — ONDANSETRON 4 MG/1
4 TABLET, ORALLY DISINTEGRATING ORAL EVERY 4 HOURS PRN
Status: DISCONTINUED | OUTPATIENT
Start: 2018-06-06 | End: 2018-06-08 | Stop reason: HOSPADM

## 2018-06-06 RX ORDER — GABAPENTIN 300 MG/1
300 CAPSULE ORAL EVERY EVENING
COMMUNITY
End: 2019-05-21 | Stop reason: CLARIF

## 2018-06-06 RX ORDER — ALBUTEROL SULFATE 90 UG/1
2 AEROSOL, METERED RESPIRATORY (INHALATION) EVERY 6 HOURS PRN
COMMUNITY
End: 2019-05-21 | Stop reason: CLARIF

## 2018-06-06 RX ORDER — ASPIRIN 325 MG
325 TABLET ORAL DAILY
Status: DISCONTINUED | OUTPATIENT
Start: 2018-06-07 | End: 2018-06-08 | Stop reason: HOSPADM

## 2018-06-06 RX ORDER — BISACODYL 10 MG
10 SUPPOSITORY, RECTAL RECTAL
Status: DISCONTINUED | OUTPATIENT
Start: 2018-06-06 | End: 2018-06-08 | Stop reason: HOSPADM

## 2018-06-06 RX ORDER — LACTULOSE 10 G/15ML
10 SOLUTION ORAL 2 TIMES DAILY
COMMUNITY
End: 2019-05-21 | Stop reason: CLARIF

## 2018-06-06 RX ORDER — AMOXICILLIN 250 MG
2 CAPSULE ORAL 2 TIMES DAILY
Status: DISCONTINUED | OUTPATIENT
Start: 2018-06-06 | End: 2018-06-08 | Stop reason: HOSPADM

## 2018-06-06 RX ORDER — POLYETHYLENE GLYCOL 3350 17 G/17G
1 POWDER, FOR SOLUTION ORAL
Status: DISCONTINUED | OUTPATIENT
Start: 2018-06-06 | End: 2018-06-08 | Stop reason: HOSPADM

## 2018-06-06 RX ORDER — ASPIRIN 300 MG/1
300 SUPPOSITORY RECTAL ONCE
Status: COMPLETED | OUTPATIENT
Start: 2018-06-06 | End: 2018-06-06

## 2018-06-06 RX ORDER — PROMETHAZINE HYDROCHLORIDE 25 MG/1
12.5-25 SUPPOSITORY RECTAL EVERY 4 HOURS PRN
Status: DISCONTINUED | OUTPATIENT
Start: 2018-06-06 | End: 2018-06-08 | Stop reason: HOSPADM

## 2018-06-06 RX ORDER — LISINOPRIL 5 MG/1
5 TABLET ORAL DAILY
COMMUNITY
End: 2019-05-21 | Stop reason: CLARIF

## 2018-06-06 RX ORDER — LISINOPRIL 5 MG/1
5 TABLET ORAL DAILY
Status: DISCONTINUED | OUTPATIENT
Start: 2018-06-07 | End: 2018-06-08 | Stop reason: HOSPADM

## 2018-06-06 RX ORDER — ONDANSETRON 2 MG/ML
4 INJECTION INTRAMUSCULAR; INTRAVENOUS EVERY 4 HOURS PRN
Status: DISCONTINUED | OUTPATIENT
Start: 2018-06-06 | End: 2018-06-08 | Stop reason: HOSPADM

## 2018-06-06 RX ORDER — ASPIRIN 81 MG/1
324 TABLET, CHEWABLE ORAL ONCE
Status: COMPLETED | OUTPATIENT
Start: 2018-06-06 | End: 2018-06-06

## 2018-06-06 RX ORDER — PROMETHAZINE HYDROCHLORIDE 25 MG/1
12.5-25 TABLET ORAL EVERY 4 HOURS PRN
Status: DISCONTINUED | OUTPATIENT
Start: 2018-06-06 | End: 2018-06-08 | Stop reason: HOSPADM

## 2018-06-06 RX ADMIN — ASPIRIN 324 MG: 81 TABLET, CHEWABLE ORAL at 21:14

## 2018-06-06 RX ADMIN — NITROGLYCERIN 1 INCH: 20 OINTMENT TOPICAL at 21:15

## 2018-06-06 ASSESSMENT — PAIN SCALES - GENERAL: PAINLEVEL_OUTOF10: 2

## 2018-06-06 NOTE — DISCHARGE INSTRUCTIONS
You were seen in the ER for chest pain and tingling in her arms. Your symptoms are most likely due to anxiety. Your labs and imaging did not reveal any acute abnormality that requires further workup, consultation, or admission to the hospital. You are safe for discharge. I would like you to follow up with your primary care physician within the next 24-48 hours for a recheck. I would also like you to go immediately (tomorrow) to [facility] to discuss financial assistance and getting an appointment with a cardiologist as you already have the referral. If you develop any new or worsening symptoms please return to the ER.    Chest Pain, Nonspecific  It is often hard to give a specific diagnosis for the cause of chest pain. There is always a chance that your pain could be related to something serious, like a heart attack or a blood clot in the lungs. You need to follow up with your caregiver for further evaluation. More lab tests or other studies such as X-rays, electrocardiography, stress testing, or cardiac imaging may be needed to find the cause of your pain.  Most of the time, nonspecific chest pain improves within 2 to 3 days with rest and mild pain medicine. For the next few days, avoid physical exertion or activities that bring on pain. Do not smoke. Avoid drinking alcohol. Call your caregiver for routine follow-up as advised.   SEEK IMMEDIATE MEDICAL CARE IF:  · You develop increased chest pain or pain that radiates to the arm, neck, jaw, back, or abdomen.   · You develop shortness of breath, increased coughing, or you start coughing up blood.   · You have severe back or abdominal pain, nausea, or vomiting.   · You develop severe weakness, fainting, fever, or chills.   Document Released: 12/18/2006 Document Revised: 03/11/2013 Document Reviewed: 06/06/2008  [employer] Patient Information ©2013 [employer].  
Bill For Surgical Tray: no
Billing Type: Third-Party Bill
Performing Laboratory: [id number]

## 2018-06-06 NOTE — ED NOTES
[place] fall risk performed.  Pt low risk for fall. Pt educated on call light and oriented to room.

## 2018-06-06 NOTE — ED NOTES
Pt discharged to home with discharge instructions.  Pt verbalized understanding of discharge instructions and follow up.  Steady gait, vital signs stable.  Questions with discharge answered.

## 2018-06-06 NOTE — ED TRIAGE NOTES
"Thai Coelho SMITH  32 y.o. male  Chief Complaint   Patient presents with   • Numbness     Pt. states bilateral numbness and tingling in his hands and arms. Pt states that both arms and hands feel cold at times as of recently.   • Tingling   • Shortness of Breath     Pt. states he has been having a hard time catching his breath as well today. Pt. states his ribs hurt radiating to the ribs in his back.   • Chest Pain     Pt. states right sided chest pain that was sharp and stabbing in nature.    /82   Pulse 92   Temp 36.5 °C (97.7 °F)   Resp 16   Ht 1.702 m (5' 7\")   Wt 87 kg (191 lb 12.8 oz)   SpO2 98%   BMI 30.04 kg/m²     Pt. States he has a hx of abnormal EKGs. Pt. Has CMS intact when touching his own arms. Pt. State throbbing sensation in his arms at present time. Pulses are palpable.   Pt amb to triage with steady gait for above complaint.   Pt is alert and oriented, speaking in full sentences, follows commands and responds appropriately to questions. NAD. Resp are even and unlabored.  Pt placed in lobby. Pt educated on triage process. Pt encouraged to alert staff for any changes.  "

## 2018-06-06 NOTE — ED PROVIDER NOTES
"ED Provider Note    Scribed for Rod Mazariegos M.D. by Jamia Rodriges. 6/5/2018, 10:37 PM.    Primary care provider: Jered Vogel M.D.  Means of arrival: Walk-in  History obtained from: Patient  History limited by: None    CHIEF COMPLAINT  Chief Complaint   Patient presents with   • Numbness     Pt. states bilateral numbness and tingling in his hands and arms. Pt states that both arms and hands feel cold at times as of recently.   • Tingling   • Shortness of Breath     Pt. states he has been having a hard time catching his breath as well today. Pt. states his ribs hurt radiating to the ribs in his back.   • Chest Pain     Pt. states right sided chest pain that was sharp and stabbing in nature.      ARACELY SMITH is a 32 y.o. male with a history of anxiety and panic disorder who presents to the Emergency Department for intermittent numbness in bilateral arms which began approximately seven hours ago. He states he felt the numbness sensation in the upper left arm which resolved on its own. He still has numbness in the entire right arm. He develops a tingly sensation when he tries to close the right hand or tries to grab something. He also states that his right arm is \"cold like when your leg falls asleep.\" His symptoms began when he was changing to go to work and was putting a shirt on. He was not lifting anything heavy. He did not take any medications for these symptoms. He has had these symptoms before, and it is typically there when he wakes up after a night of drinking. He has not had a drink in over three months. Normally his symptoms resolve spontaneously. Today his symptoms just happened randomly, and they have not resolved. He also notes that he gets a similar tingling in his arms with his anxiety, but it is different today because his symptoms are not resolving. He also notes that he developed right-sided sharp chest pain today that spontaneously resolved and lasted approximately " 10-15 minutes. He also feel short of breath which he normally has with anxiety and has had to use his inhaler multiple times today. He states he is always nauseated and has abdominal tenderness due to his history of gastritis. He denies hemoptysis, fever, vomiting or abdominal pain. He takes gabapentin which his doctor prescribes him for anxiety, and the patient thinks it has been working. He states he always has stressors in his life. He denies a history of PE or DVT. He is not on any hormonal therapy. He denies any recent surgeries or long plane trips or car rides. He denies any recrational drug use than marijuana. He has a history of hypertension and hyperlipidemia but has no history of diabetes. He denies a family history of cardiac problems.     REVIEW OF SYSTEMS  Pertinent positives include anxiety, numbness, tingling, chest pain (resolved), shortness of breath, nausea. Pertinent negatives include no fever, hemoptysis, vomiting, abdominal pain. As above, all other systems reviewed and are negative.   See HPI for further details. C.     PAST MEDICAL HISTORY   has a past medical history of Alcohol abuse; ETOH abuse; Gastritis; Hypertension; Liver failure (HCC); Pancreatitis; and Ulcer.    SURGICAL HISTORY   has a past surgical history that includes appendectomy and appendectomy.    SOCIAL HISTORY  Social History   Substance Use Topics   • Smoking status: Former Smoker   • Smokeless tobacco: Never Used   • Alcohol use Yes      Comment: stopped drinking 1 mo      History   Drug Use   • Types: Inhaled     Comment: lianne,     FAMILY HISTORY  He denies a family history of cardiac problems.     CURRENT MEDICATIONS  Medication Sig Dispense Refill   • LISINOPRIL PO Take 5 mg by mouth every day.     • famotidine (PEPCID) 20 MG Tab Take 1 Tab by mouth 2 times a day. 60 Tab 11     ALLERGIES  No Known Allergies    PHYSICAL EXAM  VITAL SIGNS: /82   Pulse 92   Temp 36.5 °C (97.7 °F)   Resp 16   Ht 1.702 m (5'  "7\")   Wt 87 kg (191 lb 12.8 oz)   SpO2 98%   BMI 30.04 kg/m²   Vitals reviewed.  Constitutional: Alert in no apparent distress.  HENT: No signs of trauma, Bilateral external ears normal, Nose normal.   Eyes: Pupils are equal and reactive, Conjunctiva normal, Non-icteric.   Neck: Normal range of motion, No tenderness, Supple, No stridor.   Lymphatic: No lymphadenopathy noted.   Cardiovascular: Regular rate and rhythm, no murmurs.   Thorax & Lungs: Normal breath sounds, No respiratory distress, No wheezing, No chest tenderness.   Abdomen: Bowel sounds normal, Soft, No tenderness, No peritoneal signs, No masses, No pulsatile masses.   Skin: Warm, Dry, No erythema, No rash.   Back: No bony tenderness, No CVA tenderness.   Extremities: Intact distal pulses, No edema, No tenderness, No cyanosis  Musculoskeletal: Good range of motion in all major joints. No tenderness to palpation or major deformities noted.   Neurologic: Alert , Normal motor function, Normal sensory function, No focal deficits noted. CN II-XII intact. Strength and sensation equal bilaterally. No pronator drift. Normal finger to nose test. Normal heel to shin. No dysdiadochokinesia. Gait deferred.   Psychiatric: Affect normal, Judgment normal, Mood normal.     DIAGNOSTIC STUDIES / PROCEDURES    LABS  Labs Reviewed   CBC WITH DIFFERENTIAL - Abnormal; Notable for the following:        Result Value    WBC 12.2 (*)     RBC 4.69 (*)     Neutrophils (Absolute) 8.29 (*)     All other components within normal limits   COMP METABOLIC PANEL - Abnormal; Notable for the following:     Potassium 3.4 (*)     Anion Gap 12.0 (*)     Glucose 106 (*)     All other components within normal limits   TROPONIN   CKMB   ESTIMATED GFR      All labs reviewed by me.    EKG Interpretation:  Interpreted by me    12 Lead EKG interpreted by me to show:  Normal sinus rhythm  Rate 89  Axis: Normal  Intervals: Normal  T wave inversions in V4-V6   My impression of this EKG: Does not " indicate STEMI or arrhythmia at this time, potential ischemia, EKG unchanged from prior dated 4/4/18     RADIOLOGY  DX-CHEST-2 VIEWS   Final Result         1. No active cardiopulmonary abnormalities are identified.        The radiologist's interpretation of all radiological studies have been reviewed by me.    COURSE & MEDICAL DECISION MAKING  Nursing notes, VS, PMSFHx reviewed in chart.  Differential diagnoses include but not limited to: anxiety, electrolyte abnormality, lower suspicion for TIA, CVA or MI     10:37 PM Patient seen and examined at bedside. Patient arrives afebrile with normal vital signs. Patient appears well hydrated and non-toxic. The physical exam is remarkable for normal neurologic evaluation. Ordered for x-ray of chest, EKG, CBC with differential, CMP, troponin and CKMB labs to evaluate. Patient will be treated with Ativan PO 0.5 mg for his symptoms.      12:00 AM ED testing reveals a slightly elevated white cell count at 12,200. He is not anemic and his platelet count is normal at 249,000. His potassium is slightly low at 3.4 but electrolytes are otherwise within normal limits. Troponin and CKMB are negative. EKG is unchanged from prior. X-ray of chest shows no evidence of pneumonia.     12:14 AM Patient was reevaluated at bedside, and he states he is feeling much better and his numbness and tingling has resolved. Normal lab and imaging results were discussed with the patient as above. I believe that his symptoms are likely due to anxiety. Completely normal neurologic exam, no neck pain, unlikely CVA or TIA. We discussed referring the patient for outpatient cardiac stress testing to ensure no cardiac etiology, but as he does not have insurance he will have difficulties following through on this. Because of this, I offered admission for cardiac stress testing. He states he already has a referral for cardiology follow up and has a program to help him pay for cardiology follow up and stress  testing which is called Access to Healthcare. Because of this, he declines offer for admission and would rather follow up outpatient as he already has plan. He is instructed to follow up with his primary care provider to see if he can get in for a reevaluation. If he develops any new or worsening chest pain, shortness of breath, vomiting or any other new or worsening concerns he is instructed to return to [facility]. Patient is agreeable to discharge plan with no further questions.     The patient will return for new or worsening symptoms and is stable at the time of discharge.    The patient is referred to a primary physician for blood pressure management, diabetic screening, and for all other preventative health concerns.    DISPOSITION:  Patient will be discharged home in stable condition.    FOLLOW UP:  Jered Vogel M.D.  [facility] [address] [place] [id number]  [phone]    Schedule an appointment as soon as possible for a visit in 1 day  for recheck    FINAL IMPRESSION  1. Tingling in extremities    2. Chest pain, unspecified type       I, Jamia Rodriges ([email or web address]), am scribing for, and in the presence of, Rod Mazariegos M.D..    Electronically signed by: Jamia Rodriges ([email or web address]), 6/5/2018    IRod M.D. personally performed the services described in this documentation, as scribed by Jamia Rodriges in my presence, and it is both accurate and complete.    The note accurately reflects work and decisions made by me.  Rod Mazariegos  6/6/2018  7:32 PM

## 2018-06-07 ENCOUNTER — PATIENT OUTREACH (OUTPATIENT)
Dept: HEALTH INFORMATION MANAGEMENT | Facility: OTHER | Age: 33
End: 2018-06-07

## 2018-06-07 ENCOUNTER — APPOINTMENT (OUTPATIENT)
Dept: RADIOLOGY | Facility: MEDICAL CENTER | Age: 33
End: 2018-06-07
Attending: HOSPITALIST
Payer: MEDICAID

## 2018-06-07 PROBLEM — I10 HTN (HYPERTENSION): Status: ACTIVE | Noted: 2018-06-07

## 2018-06-07 PROBLEM — R07.9 CHEST PAIN: Status: ACTIVE | Noted: 2018-06-07

## 2018-06-07 PROBLEM — K70.30 ALC (ALCOHOLIC LIVER CIRRHOSIS) (HCC): Chronic | Status: ACTIVE | Noted: 2018-06-07

## 2018-06-07 PROBLEM — F41.9 ANXIETY: Status: ACTIVE | Noted: 2018-06-07

## 2018-06-07 LAB
EKG IMPRESSION: NORMAL
LIPASE SERPL-CCNC: 182 U/L (ref 11–82)
TROPONIN I SERPL-MCNC: <0.01 NG/ML (ref 0–0.04)

## 2018-06-07 PROCEDURE — 700101 HCHG RX REV CODE 250: Performed by: HOSPITALIST

## 2018-06-07 PROCEDURE — 96374 THER/PROPH/DIAG INJ IV PUSH: CPT | Mod: XU

## 2018-06-07 PROCEDURE — 83690 ASSAY OF LIPASE: CPT

## 2018-06-07 PROCEDURE — 700102 HCHG RX REV CODE 250 W/ 637 OVERRIDE(OP): Performed by: STUDENT IN AN ORGANIZED HEALTH CARE EDUCATION/TRAINING PROGRAM

## 2018-06-07 PROCEDURE — A9502 TC99M TETROFOSMIN: HCPCS

## 2018-06-07 PROCEDURE — A9270 NON-COVERED ITEM OR SERVICE: HCPCS | Performed by: HOSPITALIST

## 2018-06-07 PROCEDURE — G0378 HOSPITAL OBSERVATION PER HR: HCPCS

## 2018-06-07 PROCEDURE — 700111 HCHG RX REV CODE 636 W/ 250 OVERRIDE (IP): Performed by: HOSPITALIST

## 2018-06-07 PROCEDURE — 36415 COLL VENOUS BLD VENIPUNCTURE: CPT

## 2018-06-07 PROCEDURE — 700111 HCHG RX REV CODE 636 W/ 250 OVERRIDE (IP)

## 2018-06-07 PROCEDURE — 700102 HCHG RX REV CODE 250 W/ 637 OVERRIDE(OP): Performed by: HOSPITALIST

## 2018-06-07 PROCEDURE — 70450 CT HEAD/BRAIN W/O DYE: CPT

## 2018-06-07 PROCEDURE — 99226 PR SUBSEQUENT OBSERVATION CARE,LEVEL III: CPT | Performed by: HOSPITALIST

## 2018-06-07 PROCEDURE — A9270 NON-COVERED ITEM OR SERVICE: HCPCS | Performed by: STUDENT IN AN ORGANIZED HEALTH CARE EDUCATION/TRAINING PROGRAM

## 2018-06-07 RX ORDER — REGADENOSON 0.08 MG/ML
INJECTION, SOLUTION INTRAVENOUS
Status: COMPLETED
Start: 2018-06-07 | End: 2018-06-07

## 2018-06-07 RX ORDER — AMLODIPINE BESYLATE 5 MG/1
2.5 TABLET ORAL 2 TIMES DAILY
Status: DISCONTINUED | OUTPATIENT
Start: 2018-06-07 | End: 2018-06-08 | Stop reason: HOSPADM

## 2018-06-07 RX ORDER — ACETAMINOPHEN 325 MG/1
650 TABLET ORAL EVERY 6 HOURS PRN
Status: DISCONTINUED | OUTPATIENT
Start: 2018-06-07 | End: 2018-06-08 | Stop reason: HOSPADM

## 2018-06-07 RX ORDER — OMEPRAZOLE 20 MG/1
20 CAPSULE, DELAYED RELEASE ORAL DAILY
Status: DISCONTINUED | OUTPATIENT
Start: 2018-06-07 | End: 2018-06-08 | Stop reason: HOSPADM

## 2018-06-07 RX ORDER — LABETALOL HYDROCHLORIDE 5 MG/ML
10 INJECTION, SOLUTION INTRAVENOUS EVERY 6 HOURS PRN
Status: DISCONTINUED | OUTPATIENT
Start: 2018-06-07 | End: 2018-06-08 | Stop reason: HOSPADM

## 2018-06-07 RX ADMIN — LISINOPRIL 5 MG: 5 TABLET ORAL at 05:00

## 2018-06-07 RX ADMIN — LABETALOL HYDROCHLORIDE 10 MG: 5 INJECTION, SOLUTION INTRAVENOUS at 21:11

## 2018-06-07 RX ADMIN — ONDANSETRON 4 MG: 4 TABLET, ORALLY DISINTEGRATING ORAL at 21:20

## 2018-06-07 RX ADMIN — ACETAMINOPHEN 650 MG: 325 TABLET, FILM COATED ORAL at 02:34

## 2018-06-07 RX ADMIN — ASPIRIN 325 MG: 325 TABLET ORAL at 05:00

## 2018-06-07 RX ADMIN — AMLODIPINE BESYLATE 2.5 MG: 5 TABLET ORAL at 16:53

## 2018-06-07 RX ADMIN — REGADENOSON 0.4 MG: 0.08 INJECTION, SOLUTION INTRAVENOUS at 11:10

## 2018-06-07 RX ADMIN — OMEPRAZOLE 20 MG: 20 CAPSULE, DELAYED RELEASE ORAL at 16:53

## 2018-06-07 ASSESSMENT — COGNITIVE AND FUNCTIONAL STATUS - GENERAL
MOBILITY SCORE: 23
SUGGESTED CMS G CODE MODIFIER MOBILITY: CI
DAILY ACTIVITIY SCORE: 24
SUGGESTED CMS G CODE MODIFIER DAILY ACTIVITY: CH
CLIMB 3 TO 5 STEPS WITH RAILING: A LITTLE

## 2018-06-07 ASSESSMENT — ENCOUNTER SYMPTOMS
NERVOUS/ANXIOUS: 1
RESPIRATORY NEGATIVE: 1
DIZZINESS: 1
CONSTITUTIONAL NEGATIVE: 1
SHORTNESS OF BREATH: 0
HEADACHES: 0
MUSCULOSKELETAL NEGATIVE: 1
VOMITING: 0
ORTHOPNEA: 0
HEARTBURN: 1
NAUSEA: 0
ABDOMINAL PAIN: 0
SENSORY CHANGE: 0
DEPRESSION: 0
EYES NEGATIVE: 1
PALPITATIONS: 0
TINGLING: 0
TREMORS: 0

## 2018-06-07 ASSESSMENT — LIFESTYLE VARIABLES
AVERAGE NUMBER OF DAYS PER WEEK YOU HAVE A DRINK CONTAINING ALCOHOL: 0
EVER FELT BAD OR GUILTY ABOUT YOUR DRINKING: YES
CONSUMPTION TOTAL: POSITIVE
SUBSTANCE_ABUSE: 0
HOW MANY TIMES IN THE PAST YEAR HAVE YOU HAD 5 OR MORE DRINKS IN A DAY: 20
EVER HAD A DRINK FIRST THING IN THE MORNING TO STEADY YOUR NERVES TO GET RID OF A HANGOVER: YES
TOTAL SCORE: 3
TOTAL SCORE: 3
HAVE PEOPLE ANNOYED YOU BY CRITICIZING YOUR DRINKING: NO
ALCOHOL_USE: YES
ON A TYPICAL DAY WHEN YOU DRINK ALCOHOL HOW MANY DRINKS DO YOU HAVE: 0
TOTAL SCORE: 3
HAVE YOU EVER FELT YOU SHOULD CUT DOWN ON YOUR DRINKING: YES

## 2018-06-07 ASSESSMENT — PAIN SCALES - GENERAL
PAINLEVEL_OUTOF10: 8
PAINLEVEL_OUTOF10: 0
PAINLEVEL_OUTOF10: 0
PAINLEVEL_OUTOF10: 3

## 2018-06-07 ASSESSMENT — PATIENT HEALTH QUESTIONNAIRE - PHQ9
4. FEELING TIRED OR HAVING LITTLE ENERGY: NOT AT ALL
5. POOR APPETITE OR OVEREATING: SEVERAL DAYS
3. TROUBLE FALLING OR STAYING ASLEEP OR SLEEPING TOO MUCH: NOT AT ALL
2. FEELING DOWN, DEPRESSED, IRRITABLE, OR HOPELESS: SEVERAL DAYS
6. FEELING BAD ABOUT YOURSELF - OR THAT YOU ARE A FAILURE OR HAVE LET YOURSELF OR YOUR FAMILY DOWN: SEVERAL DAYS
1. LITTLE INTEREST OR PLEASURE IN DOING THINGS: SEVERAL DAYS
8. MOVING OR SPEAKING SO SLOWLY THAT OTHER PEOPLE COULD HAVE NOTICED. OR THE OPPOSITE, BEING SO FIGETY OR RESTLESS THAT YOU HAVE BEEN MOVING AROUND A LOT MORE THAN USUAL: NOT AT ALL
SUM OF ALL RESPONSES TO PHQ9 QUESTIONS 1 AND 2: 2
SUM OF ALL RESPONSES TO PHQ QUESTIONS 1-9: 4
7. TROUBLE CONCENTRATING ON THINGS, SUCH AS READING THE NEWSPAPER OR WATCHING TELEVISION: NOT AT ALL
9. THOUGHTS THAT YOU WOULD BE BETTER OFF DEAD, OR OF HURTING YOURSELF: NOT AT ALL

## 2018-06-07 NOTE — PROGRESS NOTES
Received report from Shade REGALADO in ED. Patient is resting in bed with tele monitor on and SR 71 on the monitor. All belongings in place. No signs of labored breathing. Patient reports chest pain 3/10. All belongings within reach. Patient educated to call for assistance. No further questions at this time.

## 2018-06-07 NOTE — H&P
DATE OF ADMISSION:  06/06/2018    PRIMARY CARE PHYSICIAN:  Jered Vogel MD    CHIEF COMPLAINT:  Chest pain.    HISTORY OF PRESENT ILLNESS:  Patient is a 32-year-old male, who reports that   he has a history of alcoholic liver cirrhosis and hypertension.  He presents   to the hospital complaining of burning chest pain.  He reports that his   symptoms have been on and off over the past couple of days.  He reports that   he has also had associated shortness of breath and diaphoresis.  He was seen   yesterday and was discharged home, but upon returning home, he states that his   symptoms persisted; therefore, he decided to come into the hospital for   further evaluation.  He has no other complaints.    REVIEW OF SYSTEMS:  As per HPI.  All other systems have been reviewed and are   negative.    PAST MEDICAL HISTORY:  1.  Alcoholic gastritis.  2.  History of alcoholic pancreatitis.  3.  Alcoholic liver cirrhosis.  4.  Hyperlipidemia.  5.  Hypertension.    PAST SURGICAL HISTORY:  Appendectomy.    SOCIAL HISTORY:  He smokes weed.  He has a history of multiple different drugs   and he used in the past week, but he cannot tell me why?  He states that he   has not had alcohol in at least 3 months.    FAMILY HISTORY:  Has been reviewed and is not pertinent to his current   presentation.    HOME MEDICATIONS:  1.  Lisinopril 5 mg daily.  2.  Pepcid.  3.  Ativan as needed.    ALLERGIES:  No known drug allergies.    PHYSICAL EXAMINATION:  VITAL SIGNS:  Blood pressure 107/66, pulse 103, respirations 17, temperature   36.9, oxygen saturations 96% on room air, weight 86.7 kilograms.  GENERAL:  Patient is currently not in any acute distress, resting comfortably.  HEENT:  Moist mucous membranes.  No oropharyngeal exudates.  Eyes, EOMI,   PERRLA.  NECK:  No lymphadenopathy, no JVD.  CARDIOVASCULAR:  Regular rate and rhythm.  No murmurs.  LUNGS:  Clear to auscultation bilaterally.  No rales, rhonchi, or wheezes.  ABDOMEN:   Positive bowel sounds, soft, nontender, nondistended.  EXTREMITIES:  No clubbing, cyanosis, or edema.  NEUROLOGIC:  Awake, alert, and oriented to person, place, time, and situation.  SKIN:  He has multiple tattoos all over.    LABORATORY DATA:  WBC 11.4, hemoglobin 15.5, hematocrit 46.1, platelets 278.    Sodium 137, potassium 4, BUN is 12, creatinine 0.84, troponin less than 0.01.    D-dimer is less than 200.    IMAGING:  EKG shows normal sinus rhythm and some T-wave inversions in the   inferior lateral leads which have resolved.  Chest x-ray shows no acute   cardiopulmonary process.    ASSESSMENT AND PLAN:  Patient is a 32-year-old male who presents to the   hospital complaining of chest pain.  1.  Chest pain, atypical.  Cardiac enzymes are negative.  EKG is essentially   unremarkable.  At this point, we will put him under observation status with   cardiac monitoring.  I will obtain 2 more sets of cardiac enzymes.  I started   him on aspirin and we will obtain a pharmacological stress test in the   morning.  2.  Alcoholic liver cirrhosis.  3.  History of hypertension.  Continue lisinopril 5 mg daily.  4.  He is a full code.  5.  Deep venous thrombosis prophylaxis, none needed at this time.  He is fully   ambulatory.       ____________________________________     MD ANKUR Trinidad / BILL    DD:  06/07/2018 04:08:36  DT:  06/07/2018 04:41:54    D#:  [id number]  Job#:  [id number]

## 2018-06-07 NOTE — ED NOTES
Amb to 26.  Placed on cardiac monitor.  Pt requesting to speak with ERP prior to any interventions.

## 2018-06-07 NOTE — PROGRESS NOTES
Assumed care of pt, fall precautions in place, bed locked in lowest position. Answered all questions at this time, Instructed pt to call before getting out of bed.

## 2018-06-07 NOTE — DISCHARGE SUMMARY
"Discharge Summary    CHIEF COMPLAINT ON ADMISSION  Chief Complaint   Patient presents with   • Chest Pain   • Tingling   • Weakness       Reason for Admission  Zuleika     Admission Date  6/6/2018    CODE STATUS  Full Code    HPI & HOSPITAL COURSE  This is a 32 y.o. male here with atypical chest pain.  His troponins were negative, cardiac monitoring was unremarkable and cardiac stress test was negative.  He reported mild symptoms of \"burning\" in his chest after eating lunch.  His cardiac workup showed no evidence of ischemia and his symptoms resolved with a PPI.  I suspect anxiety is also contributing to his symptoms, so outpatient cognitive behavioral therapy was recommended along with dietary changes.    Patient was also found to have elevated blood pressure and reported some dizziness at the time.  Neuro exam was normal, as was CT of the head and symptoms resolved once blood pressure decreased.  Norvasc was added to his home regimen and he agrees to keep a home blood pressure log with close outpatient follow up.    He is without complaints with a normal exam at time of discharge.       Therefore, he is discharged in fair and stable condition to home with close outpatient follow-up.      Discharge Date  6/7/18    FOLLOW UP ITEMS POST DISCHARGE  pcp    DISCHARGE DIAGNOSES  Principal Problem:    Chest pain POA: Unknown  Active Problems:    ALC (alcoholic liver cirrhosis) (HCC) (Chronic) POA: Unknown  Resolved Problems:    * No resolved hospital problems. *    Physical Exam   Constitutional: He is oriented to person, place, and time. He appears well-developed and well-nourished. No distress.   HENT:   Head: Normocephalic and atraumatic.   Mouth/Throat: Oropharynx is clear and moist.   Eyes: Conjunctivae and EOM are normal. Pupils are equal, round, and reactive to light. Left eye exhibits no discharge.   Neck: Normal range of motion. Neck supple. No JVD present.   Cardiovascular: Normal rate, regular rhythm, normal " heart sounds and intact distal pulses.  Exam reveals no gallop and no friction rub.    No murmur heard.  Pulmonary/Chest: Effort normal and breath sounds normal. No respiratory distress. He has no wheezes. He has no rales. He exhibits no tenderness.   Abdominal: Soft. Bowel sounds are normal. He exhibits no distension and no mass. There is no tenderness. There is no rebound and no guarding.   Musculoskeletal: Normal range of motion. He exhibits no edema, tenderness or deformity.   Neurological: He is alert and oriented to person, place, and time. He has normal reflexes. No cranial nerve deficit. He exhibits normal muscle tone. Coordination normal.   Skin: Skin is warm and dry. No rash noted. He is not diaphoretic. No erythema. No pallor.   Psychiatric: He has a normal mood and affect. His behavior is normal. Judgment and thought content normal.   Nursing note and vitals reviewed.  FOLLOW UP  No future appointments.  Jered Vogel M.D.  [facility] [address] [place] [id number]  [phone]      Office will call you to schedule your follow up appointment thank you         In 1 week        MEDICATIONS ON DISCHARGE     Medication List      ASK your doctor about these medications      Instructions   albuterol 108 (90 Base) MCG/ACT Aers inhalation aerosol   Inhale 2 Puffs by mouth every 6 hours as needed for Shortness of Breath.  Dose:  2 Puff     gabapentin 300 MG Caps  Commonly known as:  NEURONTIN   Take 300 mg by mouth every evening.  Dose:  300 mg     lactulose 10 GM/15ML Soln   Take 10 g by mouth 2 times a day.  Dose:  10 g     lisinopril 5 MG Tabs  Commonly known as:  PRINIVIL   Take 5 mg by mouth every day.  Dose:  5 mg            Allergies  No Known Allergies    DIET  Orders Placed This Encounter   Procedures   • Diet Order     Standing Status:   Standing     Number of Occurrences:   1     Order Specific Question:   Diet:     Answer:   Regular [1]       ACTIVITY  As  tolerated.    CONSULTATIONS      PROCEDURES  NM-CARDIAC STRESS TEST   Final Result      DX-CHEST-PORTABLE (1 VIEW)   Final Result      No acute cardiopulmonary abnormality.            LABORATORY  Lab Results   Component Value Date    SODIUM 137 06/06/2018    POTASSIUM 4.0 06/06/2018    CHLORIDE 107 06/06/2018    CO2 21 06/06/2018    GLUCOSE 88 06/06/2018    BUN 12 06/06/2018    CREATININE 0.84 06/06/2018    CREATININE 1.0 04/19/2006        Lab Results   Component Value Date    WBC 11.4 (H) 06/06/2018    HEMOGLOBIN 15.5 06/06/2018    HEMATOCRIT 46.1 06/06/2018    PLATELETCT 278 06/06/2018        Total time of the discharge process exceeds 40 minutes.

## 2018-06-07 NOTE — ED NOTES
Aneta fall assessment completed. Pt is not high risk for fall at this time.  Bed locked in low position, call light in place. Personal possessions in place.  Personal needs assessed. Safety assessed. Will monitor frequently

## 2018-06-07 NOTE — ED TRIAGE NOTES
Ambulatory to triage for tingling in arms, chest pain and weakness and fatigue with exertion. Seen for same last night and advised he should stay for stress test but at the time his symptoms had improved and he opted to go home. Symptoms have since returned. VSS. Explained triage process, to waiting room. Asked to inform RN if questions or concerns arise.

## 2018-06-07 NOTE — PROGRESS NOTES
[facility] Hospitalist Progress Note    Date of Service: 2018    Chief Complaint  32 y.o. male admitted 2018 with chest pain    Interval Problem Update  CP resolved, but bp is now high and pt reports mild dizziness, no numbness or tingling, he reports severe chronic anxiety, no visual changes etc  Will check ct head, add prn bp meds and norvasc    Consultants/Specialty      Disposition          Review of Systems   Constitutional: Negative.    HENT: Negative.    Eyes: Negative.    Respiratory: Negative.  Negative for shortness of breath.    Cardiovascular: Positive for chest pain. Negative for palpitations and orthopnea.   Gastrointestinal: Positive for heartburn. Negative for abdominal pain, nausea and vomiting.   Genitourinary: Negative.    Musculoskeletal: Negative.    Skin: Negative.    Neurological: Positive for dizziness. Negative for tingling, tremors, sensory change and headaches.   Psychiatric/Behavioral: Negative for depression, substance abuse and suicidal ideas. The patient is nervous/anxious.       Physical Exam  Laboratory/Imaging   Hemodynamics  Temp (24hrs), Av.6 °C (97.8 °F), Min:36.2 °C (97.1 °F), Max:36.9 °C (98.5 °F)   Temperature: 36.8 °C (98.2 °F)  Pulse  Av.5  Min: 69  Max: 103 Heart Rate (Monitored): 76  Blood Pressure: 145/106, NIBP: 114/72      Respiratory      Respiration: 16, Pulse Oximetry: 98 %     Work Of Breathing / Effort: Mild  RUL Breath Sounds: Clear, RML Breath Sounds: Clear, RLL Breath Sounds: Diminished, ADAM Breath Sounds: Clear, LLL Breath Sounds: Diminished    Fluids    Intake/Output Summary (Last 24 hours) at 18 1614  Last data filed at 18 0400   Gross per 24 hour   Intake              240 ml   Output                0 ml   Net              240 ml       Nutrition  Orders Placed This Encounter   Procedures   • Diet Order     Standing Status:   Standing     Number of Occurrences:   1     Order Specific Question:   Diet:     Answer:   Regular [1]      Physical Exam   Constitutional: He is oriented to person, place, and time. He appears well-developed and well-nourished. No distress.   HENT:   Head: Normocephalic and atraumatic.   Mouth/Throat: Oropharynx is clear and moist.   Eyes: Conjunctivae and EOM are normal. Pupils are equal, round, and reactive to light. Left eye exhibits no discharge.   Neck: Normal range of motion. Neck supple. No JVD present.   Cardiovascular: Normal rate, regular rhythm, normal heart sounds and intact distal pulses.  Exam reveals no gallop and no friction rub.    No murmur heard.  Pulmonary/Chest: Effort normal and breath sounds normal. No respiratory distress. He has no wheezes. He has no rales. He exhibits no tenderness.   Abdominal: Soft. Bowel sounds are normal. He exhibits no distension and no mass. There is no tenderness. There is no rebound and no guarding.   Musculoskeletal: Normal range of motion. He exhibits no edema, tenderness or deformity.   Neurological: He is alert and oriented to person, place, and time. He has normal reflexes. No cranial nerve deficit. He exhibits normal muscle tone. Coordination normal.   Skin: Skin is warm and dry. No rash noted. He is not diaphoretic. No erythema. No pallor.   Psychiatric: He has a normal mood and affect. His behavior is normal. Judgment and thought content normal.   Nursing note and vitals reviewed.      Recent Labs      06/05/18 2225 06/06/18 2056   WBC  12.2*  11.4*   RBC  4.69*  4.95   HEMOGLOBIN  14.8  15.5   HEMATOCRIT  42.9  46.1   MCV  91.5  93.1   MCH  31.6  31.3   MCHC  34.5  33.6*   RDW  43.7  44.5   PLATELETCT  249  278   MPV  11.6  11.8     Recent Labs      06/05/18 2225 06/06/18 2056   SODIUM  140  137   POTASSIUM  3.4*  4.0   CHLORIDE  106  107   CO2  22  21   GLUCOSE  106*  88   BUN  11  12   CREATININE  0.89  0.84   CALCIUM  9.6  9.4                      Assessment/Plan     * Chest pain   Assessment & Plan    Stress test negative  Suspect gerd  PPI         Anxiety   Assessment & Plan    cbt recommended as outpatient        HTN (hypertension)   Assessment & Plan    CT head pending  Add norvasc and prn bb  following          Quality-Core Measures

## 2018-06-07 NOTE — PROGRESS NOTES
Unable to perform treadmill cardiac stress test.  Patient's blood pressure 161/111 and patient complaining of dizziness.  Converted to Lexiscan cardiac stress test.

## 2018-06-07 NOTE — ED PROVIDER NOTES
ED Provider Note    Scribed for Fredi Dior M.D. by Hang Norris. 6/6/2018, 8:27 PM.    Primary care provider: Jered Vogel M.D.  Means of arrival: Walk-in  History obtained from: Patient  History limited by: None    CHIEF COMPLAINT  Chief Complaint   Patient presents with   • Chest Pain   • Tingling   • Weakness       HPI  JACKIE SMITH is a 32 y.o. male who presents to the Emergency Department for constant burning chest pain, worsening over the last few days. He notes that he has become increasingly short of breath and sweaty whenever he climbs stairs or gets up in the morning as well. Patient was seen here yesterday for similar symptoms but returned due to symptoms not resolving. He confirms marijuana usage and has had a cough.  Patient confirms a history of hypertension and high cholesterol. He denies any history of diabetes or known family cardiac history.  He is not experiencing fever.     REVIEW OF SYSTEMS  Pertinent positives include chest pain, shortness of breath, sweatiness, cough. Pertinent negatives include no fever.  All other systems reviewed and negative. C.    PAST MEDICAL HISTORY   has a past medical history of Alcohol abuse; ETOH abuse; Gastritis; Hypertension; Liver failure (HCC); Pancreatitis; and Ulcer.    SURGICAL HISTORY   has a past surgical history that includes appendectomy and appendectomy.    SOCIAL HISTORY  Social History   Substance Use Topics   • Smoking status: Former Smoker   • Smokeless tobacco: Never Used   • Alcohol use Yes      Comment: stopped drinking 1 mo      History   Drug Use   • Types: Inhaled     Comment: lianne,       FAMILY HISTORY  No pertinent family history.    CURRENT MEDICATIONS    Current Outpatient Prescriptions on File Prior to Encounter   Medication Sig Dispense Refill   • LISINOPRIL PO Take 5 mg by mouth every day.     • chlordiazepoxide (LIBRIUM) 25 MG Cap 1 tab po q 6 hrs PRN withdrawal x 2 days, then 1 tab q 8 h prn withdrawal x 2  "days , then 1 tab po q 10h prn withdrawal x 2 days, 1 tab q 12 h prn x two days 23 Cap 0   • lorazepam (ATIVAN) 0.5 MG Tab Take 1 Tab by mouth every 8 hours as needed for Anxiety. 10 Tab 0   • famotidine (PEPCID) 20 MG Tab Take 1 Tab by mouth 2 times a day. 60 Tab 11      ALLERGIES  No Known Allergies    PHYSICAL EXAM  VITAL SIGNS: /66   Pulse (!) 103   Temp 36.9 °C (98.5 °F)   Resp 17   Ht 1.702 m (5' 7\")   Wt 86.7 kg (191 lb 2.2 oz)   SpO2 96%   BMI 29.94 kg/m²     Constitutional: Well developed, Well nourished, No acute distress, Non-toxic appearance.   HENT: Normocephalic, Atraumatic, TMs normal, mucous membranes moist, no erythema, exudates, swelling, or masses, nares patent  Eyes: nonicteric  Neck: Supple, no meningismus  Lymphatic: No lymphadenopathy noted.   Cardiovascular: Regular rate and rhythm, no gallops rubs or murmurs  Lungs: Clear bilaterally   Abdomen: Bowel sounds normal, Soft, No tenderness  Skin: Warm, Dry, no rash  Back: No tenderness, No CVA tenderness.   Genitalia: Deferred  Rectal: Deferred  Extremities: No edema  Neurologic: Alert, appropriate, follows commands, moving all extremities, normal speech   Psychiatric: Affect normal    DIAGNOSTIC STUDIES / PROCEDURES    LABS  Results for orders placed or performed during the hospital encounter of 06/06/18   CBC w/ Differential   Result Value Ref Range    WBC 11.4 (H) 4.8 - 10.8 K/uL    RBC 4.95 4.70 - 6.10 M/uL    Hemoglobin 15.5 14.0 - 18.0 g/dL    Hematocrit 46.1 42.0 - 52.0 %    MCV 93.1 81.4 - 97.8 fL    MCH 31.3 27.0 - 33.0 pg    MCHC 33.6 (L) 33.7 - 35.3 g/dL    RDW 44.5 35.9 - 50.0 fL    Platelet Count 278 164 - 446 K/uL    MPV 11.8 9.0 - 12.9 fL    Neutrophils-Polys 53.90 44.00 - 72.00 %    Lymphocytes 36.40 22.00 - 41.00 %    Monocytes 7.20 0.00 - 13.40 %    Eosinophils 1.60 0.00 - 6.90 %    Basophils 0.50 0.00 - 1.80 %    Immature Granulocytes 0.40 0.00 - 0.90 %    Nucleated RBC 0.00 /100 WBC    Neutrophils (Absolute) 6.12 " 1.82 - 7.42 K/uL    Lymphs (Absolute) 4.14 1.00 - 4.80 K/uL    Monos (Absolute) 0.82 0.00 - 0.85 K/uL    Eos (Absolute) 0.18 0.00 - 0.51 K/uL    Baso (Absolute) 0.06 0.00 - 0.12 K/uL    Immature Granulocytes (abs) 0.05 0.00 - 0.11 K/uL    NRBC (Absolute) 0.00 K/uL   Complete Metabolic Panel (CMP)   Result Value Ref Range    Sodium 137 135 - 145 mmol/L    Potassium 4.0 3.6 - 5.5 mmol/L    Chloride 107 96 - 112 mmol/L    Co2 21 20 - 33 mmol/L    Anion Gap 9.0 0.0 - 11.9    Glucose 88 65 - 99 mg/dL    Bun 12 8 - 22 mg/dL    Creatinine 0.84 0.50 - 1.40 mg/dL    Calcium 9.4 8.5 - 10.5 mg/dL    AST(SGOT) 15 12 - 45 U/L    ALT(SGPT) 23 2 - 50 U/L    Alkaline Phosphatase 88 30 - 99 U/L    Total Bilirubin 0.4 0.1 - 1.5 mg/dL    Albumin 4.4 3.2 - 4.9 g/dL    Total Protein 7.4 6.0 - 8.2 g/dL    Globulin 3.0 1.9 - 3.5 g/dL    A-G Ratio 1.5 g/dL   Troponin STAT   Result Value Ref Range    Troponin I <0.01 0.00 - 0.04 ng/mL   D-Dimer (only helpful in low pre-test probability wells critieria. Do not order if patient ruled out by PERC criteria. See Weblinks at top of Labs section)   Result Value Ref Range    D-Dimer Screen <200 <250 ng/mL(D-DU)   ESTIMATED GFR   Result Value Ref Range    GFR If African American >60 >60 mL/min/1.73 m 2    GFR If Non African American >60 >60 mL/min/1.73 m 2   EKG - STAT   Result Value Ref Range    Report       [facility] Emergency Dept.    Test Date:  2018  Pt Name:    JACKIE SMITH          Department: ER  MRN:        [id number]                      Room:        26  Gender:     Male                         Technician: [id number]  :        1985                   Requested By:LAKISHA MESSER  Order #:    [id number]                    Reading MD:    Measurements  Intervals                                Axis  Rate:       78                           P:          39  MO:         128                          QRS:        66  QRSD:       74                           T:           33  QT:         392  QTc:        447    Interpretive Statements  SINUS RHYTHM  ST ELEVATION SUGGESTS PERICARDITIS  Compared to ECG 06/05/2018 21:50:03  ST (T wave) deviation now present  T-wave abnormality no longer present        All labs reviewed by me.    EKG  EKG:   Obtained at 8:54 PM  Normal Sinus rhythm   Rate 78  Benign appearing ST elevation in v2 and v3 no reciprocal change  T wave inversions from EKG one day ago that are infralateral are resolved  No ectopy  Axis normal   Intervals normal        RADIOLOGY  DX-CHEST-PORTABLE (1 VIEW)   Final Result      No acute cardiopulmonary abnormality.        The radiologist's interpretation of all radiological studies have been reviewed by me.    COURSE & MEDICAL DECISION MAKING  Nursing notes, VS, PMSFHx reviewed in chart.     8:27 PM Patient seen and examined at bedside. The patient presents with chest pain and the differential diagnosis includes but is not limited to pulmonary embolus, acute coronary syndrome, pericarditis, aortic dissection, pneumonia. Ordered for DX chest, CBC, CMP, troponin STAT, d-dimer, and EKG to evaluate. Patient was treated with aspirin 324 mg PO, aspirin 300 mg suppository, and Nitro 2% ointment for his symptoms.     9:43 PM Informed patient of his EKG results which differed slightly than yesterday. His symptoms could be consistent with inflammation around the heart. Discussed decision to admit for further evaluation. He agrees to the plan of care.     9:45 PM Spoke with Dr. Real, hospitalist, regarding the patient's case. He will admit at this time.    Decision Making:  This is a 32 y.o. year old male who presents with somewhat atypical chest pain. He was here yesterday and had what appeared to be infralateral T-wave changes. Does appear to be resolved today but he does have some ST elevation that is more prominent in V2 and V3. It is benign appearing-EKG could represent pericarditis. Patient's troponin is negative. Patient has no  reciprocal changes on EKG to suggest acute coronary syndrome. He will be admitted for telemetry and rule out of acute coronary syndrome and possible workup for pericarditis. D-dimer is negative and the context of low pretest probability for PE.    DISPOSITION:  Patient will be admitted to Dr. Real in guarded condition.     FINAL IMPRESSION  1. Chest pain, unspecified type          IHang (Scribe), am scribing for, and in the presence of, Fredi Dior M.D..    Electronically signed by: Hang Norris (Scribe), 6/6/2018    IFredi M.D. personally performed the services described in this documentation, as scribed by Hang Norris in my presence, and it is both accurate and complete.    The note accurately reflects work and decisions made by me.  Fredi Dior  6/6/2018  9:59 PM

## 2018-06-08 VITALS
WEIGHT: 190.04 LBS | BODY MASS INDEX: 29.83 KG/M2 | DIASTOLIC BLOOD PRESSURE: 99 MMHG | SYSTOLIC BLOOD PRESSURE: 145 MMHG | TEMPERATURE: 97.6 F | OXYGEN SATURATION: 100 % | HEART RATE: 79 BPM | RESPIRATION RATE: 16 BRPM | HEIGHT: 67 IN

## 2018-06-08 PROBLEM — F41.9 ANXIETY: Status: RESOLVED | Noted: 2018-06-07 | Resolved: 2018-06-08

## 2018-06-08 PROBLEM — R07.9 CHEST PAIN: Status: RESOLVED | Noted: 2018-06-07 | Resolved: 2018-06-08

## 2018-06-08 PROCEDURE — 700102 HCHG RX REV CODE 250 W/ 637 OVERRIDE(OP): Performed by: HOSPITALIST

## 2018-06-08 PROCEDURE — 99217 PR OBSERVATION CARE DISCHARGE: CPT | Performed by: HOSPITALIST

## 2018-06-08 PROCEDURE — A9270 NON-COVERED ITEM OR SERVICE: HCPCS | Performed by: HOSPITALIST

## 2018-06-08 PROCEDURE — G0378 HOSPITAL OBSERVATION PER HR: HCPCS

## 2018-06-08 PROCEDURE — 700111 HCHG RX REV CODE 636 W/ 250 OVERRIDE (IP): Performed by: HOSPITALIST

## 2018-06-08 RX ORDER — AMLODIPINE BESYLATE 2.5 MG/1
2.5 TABLET ORAL 2 TIMES DAILY
Qty: 30 TAB | Refills: 0 | Status: SHIPPED | OUTPATIENT
Start: 2018-06-08 | End: 2019-05-21 | Stop reason: CLARIF

## 2018-06-08 RX ORDER — OMEPRAZOLE 20 MG/1
20 CAPSULE, DELAYED RELEASE ORAL DAILY
Qty: 30 CAP | Refills: 0 | Status: SHIPPED | OUTPATIENT
Start: 2018-06-09 | End: 2019-05-21 | Stop reason: CLARIF

## 2018-06-08 RX ADMIN — AMLODIPINE BESYLATE 2.5 MG: 5 TABLET ORAL at 06:00

## 2018-06-08 RX ADMIN — OMEPRAZOLE 20 MG: 20 CAPSULE, DELAYED RELEASE ORAL at 06:00

## 2018-06-08 RX ADMIN — LISINOPRIL 5 MG: 5 TABLET ORAL at 06:00

## 2018-06-08 RX ADMIN — ONDANSETRON 4 MG: 4 TABLET, ORALLY DISINTEGRATING ORAL at 03:17

## 2018-06-08 RX ADMIN — ASPIRIN 325 MG: 325 TABLET ORAL at 06:00

## 2018-06-08 ASSESSMENT — PAIN SCALES - GENERAL
PAINLEVEL_OUTOF10: 0
PAINLEVEL_OUTOF10: 0

## 2018-06-08 NOTE — PROGRESS NOTES
Assumed care of pt, call light w/in reach, and fall prec in place.  Bed locked in lowest position. Pt instructed to call for assistance before getting out of bed.  All questions answered, no needs at this time.  Head CT pending

## 2018-06-08 NOTE — DISCHARGE INSTRUCTIONS
Discharge Instructions    Discharged to home by car with relative. Discharged via walking, hospital escort: Yes.  Special equipment needed: Not Applicable    Be sure to schedule a follow-up appointment with your primary care doctor or any specialists as instructed.     Discharge Plan:   Diet Plan: Discussed  Activity Level: Discussed  Confirmed Follow up Appointment: Patient to Call and Schedule Appointment  Confirmed Symptoms Management: Discussed  Medication Reconciliation Updated: Yes  Influenza Vaccine Indication: Not indicated: Previously immunized this influenza season and > 8 years of age  Influenza Vaccine Given - only chart on this line when given: Influenza Vaccine Given (See MAR)    I understand that a diet low in cholesterol, fat, and sodium is recommended for good health. Unless I have been given specific instructions below for another diet, I accept this instruction as my diet prescription.   Other diet: Regular Diet    Special Instructions: None    · Is patient discharged on Warfarin / Coumadin?   No     Depression / Suicide Risk    As you are discharged from this [facility] facility, it is important to learn how to keep safe from harming yourself.    Recognize the warning signs:  · Abrupt changes in personality, positive or negative- including increase in energy   · Giving away possessions  · Change in eating patterns- significant weight changes-  positive or negative  · Change in sleeping patterns- unable to sleep or sleeping all the time   · Unwillingness or inability to communicate  · Depression  · Unusual sadness, discouragement and loneliness  · Talk of wanting to die  · Neglect of personal appearance   · Rebelliousness- reckless behavior  · Withdrawal from people/activities they love  · Confusion- inability to concentrate      Chest Pain, Nonspecific  It is often hard to give a specific diagnosis for the cause of chest pain. There is always a chance that your pain could be related to  something serious, like a heart attack or a blood clot in the lungs. You need to follow up with your caregiver for further evaluation. More lab tests or other studies such as X-rays, electrocardiography, stress testing, or cardiac imaging may be needed to find the cause of your pain.  Most of the time, nonspecific chest pain improves within 2 to 3 days with rest and mild pain medicine. For the next few days, avoid physical exertion or activities that bring on pain. Do not smoke. Avoid drinking alcohol. Call your caregiver for routine follow-up as advised.   SEEK IMMEDIATE MEDICAL CARE IF:  · You develop increased chest pain or pain that radiates to the arm, neck, jaw, back, or abdomen.   · You develop shortness of breath, increased coughing, or you start coughing up blood.   · You have severe back or abdominal pain, nausea, or vomiting.   · You develop severe weakness, fainting, fever, or chills.   Document Released: 12/18/2006 Document Revised: 03/11/2013 Document Reviewed: 06/06/2008  [employer]® Patient Information ©2013 [email or web address].     If you or a loved one observes any of these behaviors or has concerns about self-harm, here's what you can do:  · Talk about it- your feelings and reasons for harming yourself  · Remove any means that you might use to hurt yourself (examples: pills, rope, extension cords, firearm)  · Get professional help from the community (Mental Health, Substance Abuse, psychological counseling)  · Do not be alone:Call your Safe Contact- someone whom you trust who will be there for you.  · Call your local CRISIS HOTLINE 462-0326 or 074-203-9318  · Call your local Children's Mobile Crisis Response Team [place] (655) 649-2362 or www.Eligible  · Call the toll free National Suicide Prevention Hotlines   · National Suicide Prevention Lifeline 730-069-IBNX (4218)  · National Hope Line Network 800-SUICIDE (804-0732)

## 2018-06-08 NOTE — PROGRESS NOTES
"Pt reported having pain in his right shoulder that then stopped, then for 5 minutes started having \"chest pressure\" in the center of his chest that he rated as a 5/10 pain but the pain did not radiate anywhere.  He said the pain felt like the pain he gets in his arms when he has anxiety but it was now in the center of his chest. Pt stated his vision was getting more blurry and he had nausea.  Took pt's BP - which was elevated 162/108 - pt given Labetalol.  Consulted w/ Rapid team - suggested seeing if symptoms resolved post BP medicine.  Pt also given zofran PO for nausea.  Pt is resting in bed - laughing watching TV.   Pt stated feels as though symptoms are resolving    2200 - pt reports CP and nausea have resolved  "

## 2018-06-08 NOTE — PROGRESS NOTES
Pt discharged, iv removed, went over AVS, answered all questions at this time, pt is going to get his prescriptions filled.  Pt walked out his with child and fiance, did not want a hospital escort.

## 2018-06-09 ENCOUNTER — PATIENT OUTREACH (OUTPATIENT)
Dept: HEALTH INFORMATION MANAGEMENT | Facility: OTHER | Age: 33
End: 2018-06-09

## 2018-08-06 ENCOUNTER — HOSPITAL ENCOUNTER (EMERGENCY)
Facility: MEDICAL CENTER | Age: 33
End: 2018-08-06

## 2018-08-06 VITALS
BODY MASS INDEX: 28.24 KG/M2 | HEART RATE: 89 BPM | TEMPERATURE: 99.1 F | RESPIRATION RATE: 16 BRPM | HEIGHT: 67 IN | SYSTOLIC BLOOD PRESSURE: 132 MMHG | WEIGHT: 179.9 LBS | DIASTOLIC BLOOD PRESSURE: 90 MMHG | OXYGEN SATURATION: 98 %

## 2018-08-06 LAB — EKG IMPRESSION: NORMAL

## 2018-08-06 PROCEDURE — 302449 STATCHG TRIAGE ONLY (STATISTIC)

## 2018-08-06 PROCEDURE — 93005 ELECTROCARDIOGRAM TRACING: CPT

## 2018-08-07 NOTE — ED TRIAGE NOTES
Ambulates to triage  Chief Complaint   Patient presents with   • Cough     productive cough x2 days   • Chest Pain     started today   • Weakness     generalized weakness     CP in mid sternal, worse with a cough, VSS, afebrile in triage, called for an EKG.

## 2018-08-09 ENCOUNTER — APPOINTMENT (OUTPATIENT)
Dept: RADIOLOGY | Facility: MEDICAL CENTER | Age: 33
End: 2018-08-09
Attending: EMERGENCY MEDICINE

## 2018-08-09 ENCOUNTER — HOSPITAL ENCOUNTER (EMERGENCY)
Facility: MEDICAL CENTER | Age: 33
End: 2018-08-09
Attending: EMERGENCY MEDICINE

## 2018-08-09 VITALS
WEIGHT: 177.69 LBS | RESPIRATION RATE: 18 BRPM | HEIGHT: 67 IN | BODY MASS INDEX: 27.89 KG/M2 | DIASTOLIC BLOOD PRESSURE: 84 MMHG | SYSTOLIC BLOOD PRESSURE: 141 MMHG | OXYGEN SATURATION: 96 % | TEMPERATURE: 99.6 F | HEART RATE: 92 BPM

## 2018-08-09 DIAGNOSIS — J22 LOWER RESPIRATORY INFECTION (E.G., BRONCHITIS, PNEUMONIA, PNEUMONITIS, PULMONITIS): ICD-10-CM

## 2018-08-09 LAB
ALBUMIN SERPL BCP-MCNC: 4.6 G/DL (ref 3.2–4.9)
ALBUMIN/GLOB SERPL: 1.6 G/DL
ALP SERPL-CCNC: 102 U/L (ref 30–99)
ALT SERPL-CCNC: 24 U/L (ref 2–50)
ANION GAP SERPL CALC-SCNC: 9 MMOL/L (ref 0–11.9)
APTT PPP: 22.1 SEC (ref 24.7–36)
AST SERPL-CCNC: 19 U/L (ref 12–45)
BASOPHILS # BLD AUTO: 0.3 % (ref 0–1.8)
BASOPHILS # BLD: 0.04 K/UL (ref 0–0.12)
BILIRUB SERPL-MCNC: 0.4 MG/DL (ref 0.1–1.5)
BNP SERPL-MCNC: 16 PG/ML (ref 0–100)
BUN SERPL-MCNC: 12 MG/DL (ref 8–22)
CALCIUM SERPL-MCNC: 9.5 MG/DL (ref 8.5–10.5)
CHLORIDE SERPL-SCNC: 107 MMOL/L (ref 96–112)
CO2 SERPL-SCNC: 21 MMOL/L (ref 20–33)
CREAT SERPL-MCNC: 0.85 MG/DL (ref 0.5–1.4)
EKG IMPRESSION: NORMAL
EOSINOPHIL # BLD AUTO: 0.14 K/UL (ref 0–0.51)
EOSINOPHIL NFR BLD: 1.2 % (ref 0–6.9)
ERYTHROCYTE [DISTWIDTH] IN BLOOD BY AUTOMATED COUNT: 44.8 FL (ref 35.9–50)
GLOBULIN SER CALC-MCNC: 2.9 G/DL (ref 1.9–3.5)
GLUCOSE SERPL-MCNC: 105 MG/DL (ref 65–99)
HCT VFR BLD AUTO: 46.5 % (ref 42–52)
HGB BLD-MCNC: 15.9 G/DL (ref 14–18)
IMM GRANULOCYTES # BLD AUTO: 0.04 K/UL (ref 0–0.11)
IMM GRANULOCYTES NFR BLD AUTO: 0.3 % (ref 0–0.9)
INR PPP: 1.02 (ref 0.87–1.13)
LACTATE BLD-SCNC: 1.4 MMOL/L (ref 0.5–2)
LIPASE SERPL-CCNC: 43 U/L (ref 11–82)
LYMPHOCYTES # BLD AUTO: 2.59 K/UL (ref 1–4.8)
LYMPHOCYTES NFR BLD: 22.2 % (ref 22–41)
MCH RBC QN AUTO: 30.9 PG (ref 27–33)
MCHC RBC AUTO-ENTMCNC: 34.2 G/DL (ref 33.7–35.3)
MCV RBC AUTO: 90.5 FL (ref 81.4–97.8)
MONOCYTES # BLD AUTO: 0.88 K/UL (ref 0–0.85)
MONOCYTES NFR BLD AUTO: 7.5 % (ref 0–13.4)
NEUTROPHILS # BLD AUTO: 8 K/UL (ref 1.82–7.42)
NEUTROPHILS NFR BLD: 68.5 % (ref 44–72)
NRBC # BLD AUTO: 0 K/UL
NRBC BLD-RTO: 0 /100 WBC
PLATELET # BLD AUTO: 269 K/UL (ref 164–446)
PMV BLD AUTO: 11.3 FL (ref 9–12.9)
POTASSIUM SERPL-SCNC: 4.1 MMOL/L (ref 3.6–5.5)
PROT SERPL-MCNC: 7.5 G/DL (ref 6–8.2)
PROTHROMBIN TIME: 13.1 SEC (ref 12–14.6)
RBC # BLD AUTO: 5.14 M/UL (ref 4.7–6.1)
SODIUM SERPL-SCNC: 137 MMOL/L (ref 135–145)
TROPONIN I SERPL-MCNC: <0.01 NG/ML (ref 0–0.04)
WBC # BLD AUTO: 11.7 K/UL (ref 4.8–10.8)

## 2018-08-09 PROCEDURE — 99284 EMERGENCY DEPT VISIT MOD MDM: CPT

## 2018-08-09 PROCEDURE — 83880 ASSAY OF NATRIURETIC PEPTIDE: CPT

## 2018-08-09 PROCEDURE — 93005 ELECTROCARDIOGRAM TRACING: CPT

## 2018-08-09 PROCEDURE — 83690 ASSAY OF LIPASE: CPT

## 2018-08-09 PROCEDURE — 700102 HCHG RX REV CODE 250 W/ 637 OVERRIDE(OP): Performed by: EMERGENCY MEDICINE

## 2018-08-09 PROCEDURE — A9270 NON-COVERED ITEM OR SERVICE: HCPCS | Performed by: EMERGENCY MEDICINE

## 2018-08-09 PROCEDURE — 84484 ASSAY OF TROPONIN QUANT: CPT

## 2018-08-09 PROCEDURE — 85730 THROMBOPLASTIN TIME PARTIAL: CPT

## 2018-08-09 PROCEDURE — 71045 X-RAY EXAM CHEST 1 VIEW: CPT

## 2018-08-09 PROCEDURE — 85025 COMPLETE CBC W/AUTO DIFF WBC: CPT

## 2018-08-09 PROCEDURE — 85610 PROTHROMBIN TIME: CPT

## 2018-08-09 PROCEDURE — 93005 ELECTROCARDIOGRAM TRACING: CPT | Performed by: EMERGENCY MEDICINE

## 2018-08-09 PROCEDURE — 700111 HCHG RX REV CODE 636 W/ 250 OVERRIDE (IP): Performed by: EMERGENCY MEDICINE

## 2018-08-09 PROCEDURE — 96374 THER/PROPH/DIAG INJ IV PUSH: CPT

## 2018-08-09 PROCEDURE — 80053 COMPREHEN METABOLIC PANEL: CPT

## 2018-08-09 PROCEDURE — 83605 ASSAY OF LACTIC ACID: CPT

## 2018-08-09 RX ORDER — ALBUTEROL SULFATE 90 UG/1
2 AEROSOL, METERED RESPIRATORY (INHALATION) EVERY 6 HOURS PRN
Qty: 8.5 G | Refills: 0 | Status: SHIPPED | OUTPATIENT
Start: 2018-08-09 | End: 2022-02-10

## 2018-08-09 RX ORDER — DOXYCYCLINE 100 MG/1
100 CAPSULE ORAL 2 TIMES DAILY
Qty: 20 CAP | Refills: 0 | Status: SHIPPED | OUTPATIENT
Start: 2018-08-09 | End: 2018-08-19

## 2018-08-09 RX ORDER — ONDANSETRON 2 MG/ML
4 INJECTION INTRAMUSCULAR; INTRAVENOUS ONCE
Status: COMPLETED | OUTPATIENT
Start: 2018-08-09 | End: 2018-08-09

## 2018-08-09 RX ADMIN — LIDOCAINE HYDROCHLORIDE 15 ML: 20 SOLUTION OROPHARYNGEAL at 10:28

## 2018-08-09 RX ADMIN — ONDANSETRON 4 MG: 2 INJECTION, SOLUTION INTRAMUSCULAR; INTRAVENOUS at 10:28

## 2018-08-09 ASSESSMENT — LIFESTYLE VARIABLES: DO YOU DRINK ALCOHOL: NO

## 2018-08-09 ASSESSMENT — PAIN SCALES - GENERAL
PAINLEVEL_OUTOF10: 6
PAINLEVEL_OUTOF10: 6

## 2018-08-09 NOTE — DISCHARGE INSTRUCTIONS
Please follow up with a primary physician for blood pressure management, diabetic screening, and all other preventive health concerns.      Cough, Adult  Introduction  A cough helps to clear your throat and lungs. A cough may last only 2-3 weeks (acute), or it may last longer than 8 weeks (chronic). Many different things can cause a cough. A cough may be a sign of an illness or another medical condition.  Follow these instructions at home:  · Pay attention to any changes in your cough.  · Take medicines only as told by your doctor.  ¨ If you were prescribed an antibiotic medicine, take it as told by your doctor. Do not stop taking it even if you start to feel better.  ¨ Talk with your doctor before you try using a cough medicine.  · Drink enough fluid to keep your pee (urine) clear or pale yellow.  · If the air is dry, use a cold steam vaporizer or humidifier in your home.  · Stay away from things that make you cough at work or at home.  · If your cough is worse at night, try using extra pillows to raise your head up higher while you sleep.  · Do not smoke, and try not to be around smoke. If you need help quitting, ask your doctor.  · Do not have caffeine.  · Do not drink alcohol.  · Rest as needed.  Contact a doctor if:  · You have new problems (symptoms).  · You cough up yellow fluid (pus).  · Your cough does not get better after 2-3 weeks, or your cough gets worse.  · Medicine does not help your cough and you are not sleeping well.  · You have pain that gets worse or pain that is not helped with medicine.  · You have a fever.  · You are losing weight and you do not know why.  · You have night sweats.  Get help right away if:  · You cough up blood.  · You have trouble breathing.  · Your heartbeat is very fast.  This information is not intended to replace advice given to you by your health care provider. Make sure you discuss any questions you have with your health care provider.  Document Released: 08/30/2012  Document Revised: 05/25/2017 Document Reviewed: 02/24/2016  © 2017 [employer]

## 2018-08-09 NOTE — ED TRIAGE NOTES
Chief Complaint   Patient presents with   • Chest Pressure     pt reports symptoms since Friday, pt reports increasing SOB upon exertion/ pain upon coughing.    • Cough   • Generalized Body Aches   • Fever     on and off     Explained to pt triage process, made pt aware to tell this RN/staff of any changes/concerns, pt verbalized understanding of process and instructions given. Pt to ER lobby.

## 2018-08-09 NOTE — ED NOTES
PT A&O X4, VS STABLE, RESPIRATIONS SYMMETRICAL WITH NO S/S OF DISTRESS. PT VERBALIZES UNDERSTANDING OF DISCHARGE INSTRUCTIONS AND DENIES QUESTIONS. PT DISCHARGED TO HOME.

## 2018-08-11 ENCOUNTER — HOSPITAL ENCOUNTER (EMERGENCY)
Facility: MEDICAL CENTER | Age: 33
End: 2018-08-11
Attending: EMERGENCY MEDICINE

## 2018-08-11 VITALS
WEIGHT: 175.27 LBS | OXYGEN SATURATION: 96 % | TEMPERATURE: 97.6 F | HEIGHT: 67 IN | DIASTOLIC BLOOD PRESSURE: 102 MMHG | SYSTOLIC BLOOD PRESSURE: 144 MMHG | RESPIRATION RATE: 18 BRPM | BODY MASS INDEX: 27.51 KG/M2 | HEART RATE: 78 BPM

## 2018-08-11 DIAGNOSIS — J20.9 BRONCHOSPASM WITH BRONCHITIS, ACUTE: ICD-10-CM

## 2018-08-11 PROCEDURE — 700111 HCHG RX REV CODE 636 W/ 250 OVERRIDE (IP): Performed by: EMERGENCY MEDICINE

## 2018-08-11 PROCEDURE — 700101 HCHG RX REV CODE 250: Performed by: EMERGENCY MEDICINE

## 2018-08-11 PROCEDURE — 99284 EMERGENCY DEPT VISIT MOD MDM: CPT

## 2018-08-11 RX ORDER — PREDNISONE 20 MG/1
60 TABLET ORAL ONCE
Status: COMPLETED | OUTPATIENT
Start: 2018-08-11 | End: 2018-08-11

## 2018-08-11 RX ORDER — PREDNISONE 20 MG/1
40 TABLET ORAL DAILY
Qty: 12 TAB | Refills: 0 | Status: SHIPPED | OUTPATIENT
Start: 2018-08-11 | End: 2018-08-17

## 2018-08-11 RX ADMIN — ALBUTEROL SULFATE 2.5 MG: 2.5 SOLUTION RESPIRATORY (INHALATION) at 11:09

## 2018-08-11 RX ADMIN — PREDNISONE 60 MG: 20 TABLET ORAL at 11:20

## 2018-08-11 ASSESSMENT — PAIN SCALES - GENERAL
PAINLEVEL_OUTOF10: 5
PAINLEVEL_OUTOF10: 7

## 2018-08-11 NOTE — ED PROVIDER NOTES
ED Provider Note    CHIEF COMPLAINT  Chief Complaint   Patient presents with   • Cough     Dry, non-productive cough x 1 week       HPI  JACKIE SMITH is a 32 y.o. male who presents for evaluation of upper history symptoms.  Patient presents with couple days of upper respiratory symptoms.  Patient was seen 2 days ago underwent extensive evaluation including laboratory studies, imaging studies all of which were normal.  The patient was diagnosed with bronchitis and bronchospasm.  The patient presents complaining of persistent symptoms along with shortness of breath.  He states he has felt wheezy.  Patient had no: Fever, hemoptysis, gastrointestinal symptoms, syncope.  No other acute symptomatology or complaints.    REVIEW OF SYSTEMS  See HPI for further details. All other systems negative.    PAST MEDICAL HISTORY  Past Medical History:   Diagnosis Date   • Alcohol abuse    • Alcohol abuse     5-10 beers   • Alcoholic cirrhosis (HCC)    • Anxiety    • Asthma    • Depression    • ETOH abuse    • Gastritis    • Hypertension    • Liver failure (HCC)    • Pancreatitis    • Ulcer     unsure if abdominal/intestinal       FAMILY HISTORY  History reviewed. No pertinent family history.    SOCIAL HISTORY  Positive tobacco use; history of alcohol abuse; positive marijuana use;    SURGICAL HISTORY  Past Surgical History:   Procedure Laterality Date   • APPENDECTOMY     • PB APPENDECTOMY         CURRENT MEDICATIONS  Home Medications     Reviewed by Belen García R.N. (Registered Nurse) on 08/11/18 at 0944  Med List Status: Partial   Medication Last Dose Status   albuterol (PROVENTIL) 2.5 mg/0.5 mL Nebu Soln  Active   albuterol 108 (90 Base) MCG/ACT Aero Soln inhalation aerosol unknown Active   albuterol 108 (90 Base) MCG/ACT Aero Soln inhalation aerosol  Active   amLODIPine (NORVASC) 2.5 MG Tab  Active   doxycycline (MONODOX) 100 MG capsule  Active   gabapentin (NEURONTIN) 300 MG Cap  Active   gabapentin (NEURONTIN)  "300 MG Cap 6/5/2018 Active   lactulose 10 GM/15ML Solution  Active   lactulose 10 GM/15ML Solution 6/5/2018 Active   lisinopril (PRINIVIL) 5 MG Tab 6/6/2018 Active   omeprazole (PRILOSEC) 20 MG delayed-release capsule  Active   QUEtiapine (SEROQUEL) 100 MG Tab  Active   sertraline (ZOLOFT) 100 MG Tab  Active                ALLERGIES  No Known Allergies    PHYSICAL EXAM  VITAL SIGNS: /101   Pulse 90   Temp 36.3 °C (97.4 °F) (Temporal)   Resp 16   Ht 1.702 m (5' 7\")   Wt 79.5 kg (175 lb 4.3 oz)   SpO2 96%   BMI 27.45 kg/m²    Constitutional: 32-year-old male, awake, oriented ×3  HENT: Normocephalic, Atraumatic, Nares:Clear, Oropharynx: moist, well hydrated, posterior pharynx:clear   Eyes: PERRL, EOMI, Conjunctiva normal, No discharge.   Neck: Normal range of motion, No tenderness, Supple, No stridor.   Lymphatic: No lymphadenopathy noted.   Cardiovascular: Regular rate and rhythm without mumurs, gallups, rubs   Thorax & Lungs: Normal Equal breath sounds, No respiratory distress, mild bilateral wheezing, no stridor, no rales. No chest tenderness.   Abdomen: Soft, nontender, nondistended, no organomegaly, positive bowel sounds normal in quality. No guarding or rebound.  Skin: Good skin turgor, pink, warm, dry. No rashes, petechiae, purpura. Normal capillary refill.   Back: No tenderness, No CVA tenderness.   Extremities: Intact distal pulses, No edema, No tenderness, No cyanosis,  Vascular: Pulses are 2+, symmetric in the upper and lower extremities.  Musculoskeletal: Good range of motion in all major joints. No tenderness to palpation or major deformities noted.   Neurologic: Alert & oriented x 3,  No gross focal deficits noted.   Psychiatric: Affect normal, Judgment normal, Mood normal.       RADIOLOGY/PROCEDURES  Previous imaging study was reviewed    COURSE & MEDICAL DECISION MAKING  Pertinent Labs & Imaging studies reviewed. (See chart for details)  1.  Prednisone 60 mg  2.  Albuterol bySVN    Previous " laboratory studies were reviewed    Discussion: After the above-noted treating the patient was reevaluated.  Patient had improved breath sounds with decreased wheezing.  At this time, I see no indication for emergent hospitalization.  I discussed the findings and treatment plan with the patient.  Indicates he is comfortable with this explanation disposition.    FINAL IMPRESSION  1. Bronchospasm with bronchitis, acute        PLAN  1.  Appropriate discharge instructions given  2.  Continue his current medications including albuterol and doxycycline  3.  Prednisone 20 mg #12  4.  Follow-up with primary care    Electronically signed by: Guy G Gansert, 8/11/2018 10:57 AM

## 2018-08-11 NOTE — ED NOTES
Patient discharged home, instructions given with prescriptions, verbalized understanding. Ambulatory to DC area steady gait.

## 2018-08-11 NOTE — DISCHARGE INSTRUCTIONS
Acute Bronchitis, Adult  Acute bronchitis is when air tubes (bronchi) in the lungs suddenly get swollen. The condition can make it hard to breathe. It can also cause these symptoms:  · A cough.  · Coughing up clear, yellow, or green mucus.  · Wheezing.  · Chest congestion.  · Shortness of breath.  · A fever.  · Body aches.  · Chills.  · A sore throat.  Follow these instructions at home:  Medicines  · Take over-the-counter and prescription medicines only as told by your doctor.  · If you were prescribed an antibiotic medicine, take it as told by your doctor. Do not stop taking the antibiotic even if you start to feel better.  General instructions  · Rest.  · Drink enough fluids to keep your pee (urine) clear or pale yellow.  · Avoid smoking and secondhand smoke. If you smoke and you need help quitting, ask your doctor. Quitting will help your lungs heal faster.  · Use an inhaler, cool mist vaporizer, or humidifier as told by your doctor.  · Keep all follow-up visits as told by your doctor. This is important.  How is this prevented?  To lower your risk of getting this condition again:  · Wash your hands often with soap and water. If you cannot use soap and water, use hand .  · Avoid contact with people who have cold symptoms.  · Try not to touch your hands to your mouth, nose, or eyes.  · Make sure to get the flu shot every year.  Contact a doctor if:  · Your symptoms do not get better in 2 weeks.  Get help right away if:  · You cough up blood.  · You have chest pain.  · You have very bad shortness of breath.  · You become dehydrated.  · You faint (pass out) or keep feeling like you are going to pass out.  · You keep throwing up (vomiting).  · You have a very bad headache.  · Your fever or chills gets worse.  This information is not intended to replace advice given to you by your health care provider. Make sure you discuss any questions you have with your health care provider.  Document Released: 06/05/2009  Document Revised: 07/26/2017 Document Reviewed: 06/07/2017  [employer] Interactive Patient Education © 2017 [employer]    Bronchospasm, Adult  A bronchospasm is when the tubes that carry air in and out of your lungs (airways) spasm or tighten. During a bronchospasm it is hard to breathe. This is because the airways get smaller. A bronchospasm can be triggered by:  · Allergies. These may be to animals, pollen, food, or mold.  · Infection. This is a common cause of bronchospasm.  · Exercise.  · Irritants. These include pollution, cigarette smoke, strong odors, aerosol sprays, and paint fumes.  · Weather changes.  · Stress.  · Being emotional.  Follow these instructions at home:  · Always have a plan for getting help. Know when to call your doctor and local emergency services (911 in the [place] Know where you can get emergency care.  · Only take medicines as told by your doctor.  · If you were prescribed an inhaler or nebulizer machine, ask your doctor how to use it correctly. Always use a spacer with your inhaler if you were given one.  · Stay calm during an attack. Try to relax and breathe more slowly.  · Control your home environment:  ¨ Change your heating and air conditioning filter at least once a month.  ¨ Limit your use of fireplaces and wood stoves.  ¨ Do not  smoke. Do not  allow smoking in your home.  ¨ Avoid perfumes and fragrances.  ¨ Get rid of pests (such as roaches and mice) and their droppings.  ¨ Throw away plants if you see mold on them.  ¨ Keep your house clean and dust free.  ¨ Replace carpet with wood, tile, or vinyl veronica. Carpet can trap dander and dust.  ¨ Use allergy-proof pillows, mattress covers, and box spring covers.  ¨ Wash bed sheets and blankets every week in hot water. Dry them in a dryer.  ¨ Use blankets that are made of polyester or cotton.  ¨ Wash hands frequently.  Contact a doctor if:  · You have muscle aches.  · You have chest pain.  · The thick spit you spit or cough up  (sputum) changes from clear or white to yellow, green, gray, or bloody.  · The thick spit you spit or cough up gets thicker.  · There are problems that may be related to the medicine you are given such as:  ¨ A rash.  ¨ Itching.  ¨ Swelling.  ¨ Trouble breathing.  Get help right away if:  · You feel you cannot breathe or catch your breath.  · You cannot stop coughing.  · Your treatment is not helping you breathe better.  · You have very bad chest pain.  This information is not intended to replace advice given to you by your health care provider. Make sure you discuss any questions you have with your health care provider.  Document Released: 10/15/2010 Document Revised: 05/25/2017 Document Reviewed: 06/10/2014  [employer] Interactive Patient Education © 2017 [employer]

## 2018-08-11 NOTE — ED TRIAGE NOTES
".  Chief Complaint   Patient presents with   • Cough     Dry, non-productive cough x 1 week     ./101   Pulse 90   Temp 36.3 °C (97.4 °F) (Temporal)   Resp 16   Ht 1.702 m (5' 7\")   Wt 79.5 kg (175 lb 4.3 oz)   SpO2 96%   BMI 27.45 kg/m²     Ambulatory to triage with above complaint, seen here 2 days ago and diagnosed with lower respiratory infection, taking abx as prescribed with no improvement in symptoms, has not f/u w/PCP, mask placed on patient, educated on triage process, placed in lobby, told to inform staff of any changes in condition.    "

## 2018-09-21 ENCOUNTER — APPOINTMENT (OUTPATIENT)
Dept: RADIOLOGY | Facility: MEDICAL CENTER | Age: 33
End: 2018-09-21
Attending: EMERGENCY MEDICINE
Payer: MEDICAID

## 2018-09-21 ENCOUNTER — HOSPITAL ENCOUNTER (EMERGENCY)
Facility: MEDICAL CENTER | Age: 33
End: 2018-09-21
Attending: EMERGENCY MEDICINE
Payer: MEDICAID

## 2018-09-21 VITALS
TEMPERATURE: 97.9 F | BODY MASS INDEX: 28.27 KG/M2 | WEIGHT: 180.12 LBS | OXYGEN SATURATION: 99 % | RESPIRATION RATE: 17 BRPM | HEART RATE: 73 BPM | HEIGHT: 67 IN | DIASTOLIC BLOOD PRESSURE: 87 MMHG | SYSTOLIC BLOOD PRESSURE: 140 MMHG

## 2018-09-21 DIAGNOSIS — T75.4XXA ELECTROCUTION AND NONFATAL EFFECTS OF ELECTRIC CURRENT, INITIAL ENCOUNTER: Primary | ICD-10-CM

## 2018-09-21 LAB
ALBUMIN SERPL BCP-MCNC: 4.3 G/DL (ref 3.2–4.9)
ALBUMIN/GLOB SERPL: 1.3 G/DL
ALP SERPL-CCNC: 98 U/L (ref 30–99)
ALT SERPL-CCNC: 29 U/L (ref 2–50)
ANION GAP SERPL CALC-SCNC: 10 MMOL/L (ref 0–11.9)
APTT PPP: 25.8 SEC (ref 24.7–36)
AST SERPL-CCNC: 19 U/L (ref 12–45)
BASOPHILS # BLD AUTO: 0.4 % (ref 0–1.8)
BASOPHILS # BLD: 0.04 K/UL (ref 0–0.12)
BILIRUB SERPL-MCNC: 0.4 MG/DL (ref 0.1–1.5)
BNP SERPL-MCNC: 77 PG/ML (ref 0–100)
BUN SERPL-MCNC: 11 MG/DL (ref 8–22)
CALCIUM SERPL-MCNC: 9.8 MG/DL (ref 8.5–10.5)
CHLORIDE SERPL-SCNC: 107 MMOL/L (ref 96–112)
CO2 SERPL-SCNC: 25 MMOL/L (ref 20–33)
CREAT SERPL-MCNC: 0.92 MG/DL (ref 0.5–1.4)
EOSINOPHIL # BLD AUTO: 0.09 K/UL (ref 0–0.51)
EOSINOPHIL NFR BLD: 0.8 % (ref 0–6.9)
ERYTHROCYTE [DISTWIDTH] IN BLOOD BY AUTOMATED COUNT: 43.5 FL (ref 35.9–50)
GLOBULIN SER CALC-MCNC: 3.2 G/DL (ref 1.9–3.5)
GLUCOSE SERPL-MCNC: 82 MG/DL (ref 65–99)
HCT VFR BLD AUTO: 45.6 % (ref 42–52)
HGB BLD-MCNC: 15.3 G/DL (ref 14–18)
IMM GRANULOCYTES # BLD AUTO: 0.04 K/UL (ref 0–0.11)
IMM GRANULOCYTES NFR BLD AUTO: 0.4 % (ref 0–0.9)
INR PPP: 0.97 (ref 0.87–1.13)
LIPASE SERPL-CCNC: 47 U/L (ref 11–82)
LYMPHOCYTES # BLD AUTO: 3.6 K/UL (ref 1–4.8)
LYMPHOCYTES NFR BLD: 31.7 % (ref 22–41)
MCH RBC QN AUTO: 29.5 PG (ref 27–33)
MCHC RBC AUTO-ENTMCNC: 33.6 G/DL (ref 33.7–35.3)
MCV RBC AUTO: 87.9 FL (ref 81.4–97.8)
MONOCYTES # BLD AUTO: 0.61 K/UL (ref 0–0.85)
MONOCYTES NFR BLD AUTO: 5.4 % (ref 0–13.4)
NEUTROPHILS # BLD AUTO: 6.97 K/UL (ref 1.82–7.42)
NEUTROPHILS NFR BLD: 61.3 % (ref 44–72)
NRBC # BLD AUTO: 0 K/UL
NRBC BLD-RTO: 0 /100 WBC
PLATELET # BLD AUTO: 297 K/UL (ref 164–446)
PMV BLD AUTO: 11.2 FL (ref 9–12.9)
POTASSIUM SERPL-SCNC: 3.5 MMOL/L (ref 3.6–5.5)
PROT SERPL-MCNC: 7.5 G/DL (ref 6–8.2)
PROTHROMBIN TIME: 13 SEC (ref 12–14.6)
RBC # BLD AUTO: 5.19 M/UL (ref 4.7–6.1)
SODIUM SERPL-SCNC: 142 MMOL/L (ref 135–145)
TROPONIN I SERPL-MCNC: <0.01 NG/ML (ref 0–0.04)
WBC # BLD AUTO: 11.4 K/UL (ref 4.8–10.8)

## 2018-09-21 PROCEDURE — 93005 ELECTROCARDIOGRAM TRACING: CPT

## 2018-09-21 PROCEDURE — 85730 THROMBOPLASTIN TIME PARTIAL: CPT

## 2018-09-21 PROCEDURE — 85025 COMPLETE CBC W/AUTO DIFF WBC: CPT

## 2018-09-21 PROCEDURE — 71045 X-RAY EXAM CHEST 1 VIEW: CPT

## 2018-09-21 PROCEDURE — 93005 ELECTROCARDIOGRAM TRACING: CPT | Performed by: EMERGENCY MEDICINE

## 2018-09-21 PROCEDURE — 84484 ASSAY OF TROPONIN QUANT: CPT

## 2018-09-21 PROCEDURE — 83690 ASSAY OF LIPASE: CPT

## 2018-09-21 PROCEDURE — 99284 EMERGENCY DEPT VISIT MOD MDM: CPT

## 2018-09-21 PROCEDURE — 83880 ASSAY OF NATRIURETIC PEPTIDE: CPT

## 2018-09-21 PROCEDURE — 85610 PROTHROMBIN TIME: CPT

## 2018-09-21 PROCEDURE — 80053 COMPREHEN METABOLIC PANEL: CPT

## 2018-09-21 ASSESSMENT — PAIN DESCRIPTION - DESCRIPTORS: DESCRIPTORS: ACHING

## 2018-09-21 ASSESSMENT — PAIN SCALES - GENERAL: PAINLEVEL_OUTOF10: 6

## 2018-09-22 LAB
EKG IMPRESSION: NORMAL
EKG IMPRESSION: NORMAL

## 2018-09-22 NOTE — ED NOTES
Hourly rounding performed. Assessed patient complaints, offered bathroom, and offered comforts.      Reports that he is feeling significantly better.

## 2018-09-22 NOTE — ED TRIAGE NOTES
Pt BIB [facility], ambulatory to triage. Pt c/o possible electric shock while moving a metal tray while working at [employer]. No visible injury to bilat hands, mild redness as pt is rubbing hands together vigorously. Pt is extremely anxious, hx of same. Pt also c/o mid chest discomfort after incident. + SOB. Denies n/v. Pt taken back for EKG.

## 2018-09-22 NOTE — PROGRESS NOTES
Patient is being discharged from ED to home. Discharge instructions were discussed by RN with patient and/or Family. No questions at this time. Medications were discussed with patient. VS within normal limits or have been addressed with patient and MD.

## 2018-09-22 NOTE — ED PROVIDER NOTES
ED Provider Note    CHIEF COMPLAINT  Chief Complaint   Patient presents with   • Anxiety   • Chest Pain   • [employer]       HPI  JACKIE SMITH is a 32 y.o. male who presents for evaluation of electric shock..  The patient was at work when he was moving a metal tray and he received electric shock.  There is no history of entry or exit wounds.  He became very anxious.  Initially he had some chest discomfort lasting a few seconds.  He presents the emergency department for evaluation.  He does have a history of hypertension.  Denies any headache blurred vision he is on sure of the voltage, there is no history of tetany.  His past medical history is reviewed has a history of alcohol abuse.    REVIEW OF SYSTEMS  See HPI for further details.  Denies shortness of breath abdominal pain nausea vomiting chest pain of persistent nature no pleuritic pain and all other systems reviewed and negative except as noted above.    PAST MEDICAL HISTORY  Past Medical History:   Diagnosis Date   • Alcohol abuse    • Alcohol abuse     5-10 beers   • Alcoholic cirrhosis (HCC)    • Anxiety    • Asthma    • Depression    • ETOH abuse    • Gastritis    • Hypertension    • Liver failure (HCC)    • Pancreatitis    • Ulcer     unsure if abdominal/intestinal       FAMILY HISTORY  History reviewed. No pertinent family history.    SOCIAL HISTORY  Social History     Social History   • Marital status: Single     Spouse name: N/A   • Number of children: N/A   • Years of education: N/A     Social History Main Topics   • Smoking status: Former Smoker     Types: Cigarettes   • Smokeless tobacco: Never Used   • Alcohol use No      Comment: quit x6 months   • Drug use: Yes     Types: Inhaled      Comment: lianne,   • Sexual activity: Not on file     Other Topics Concern   • Not on file     Social History Narrative    ** Merged History Encounter **         ** Merged History Encounter **            SURGICAL HISTORY  Past Surgical History:  "  Procedure Laterality Date   • APPENDECTOMY     • PB APPENDECTOMY         CURRENT MEDICATIONS  Home Medications     Reviewed by Asiya Sawyer R.N. (Registered Nurse) on 09/21/18 at 2043  Med List Status: Not Addressed   Medication Last Dose Status   albuterol (PROVENTIL) 2.5 mg/0.5 mL Nebu Soln  Active   albuterol 108 (90 Base) MCG/ACT Aero Soln inhalation aerosol unknown Active   albuterol 108 (90 Base) MCG/ACT Aero Soln inhalation aerosol  Active   amLODIPine (NORVASC) 2.5 MG Tab  Active   gabapentin (NEURONTIN) 300 MG Cap  Active   gabapentin (NEURONTIN) 300 MG Cap 6/5/2018 Active   lactulose 10 GM/15ML Solution  Active   lactulose 10 GM/15ML Solution 6/5/2018 Active   lisinopril (PRINIVIL) 5 MG Tab 6/6/2018 Active   omeprazole (PRILOSEC) 20 MG delayed-release capsule  Active   QUEtiapine (SEROQUEL) 100 MG Tab  Active   sertraline (ZOLOFT) 100 MG Tab  Active                 ALLERGIES  No Known Allergies    PHYSICAL EXAM  VITAL SIGNS: /87   Pulse 75   Temp 36.6 °C (97.9 °F) (Temporal)   Resp 16   Ht 1.702 m (5' 7\")   Wt 81.7 kg (180 lb 1.9 oz)   SpO2 99%   BMI 28.21 kg/m²   Constitutional :  Well developed, Well nourished, No acute distress, Non-toxic appearance.   HENT: Head is atraumatic normocephalic moist mucous membranes  Eyes: Normal-appearing no erythema no evidence of infection  Neck: Normal range of motion, No tenderness, Supple, No stridor.   Lymphatic: No cervical adenopathy is noted.   Cardiovascular: Normal heart rate, Normal rhythm, No murmurs, No rubs, No gallops.   Thorax & Lungs: Equal breath sounds bilaterally no rales rhonchi  Skin: Warm, Dry, No erythema, No rash.  No evidence of burns  Abdomen soft no tenderness is elicited to palpation  Neurologically he is awake is alert he has no focal neurological findings  Psychiatric no impairment of judgment he is anxious    Results for orders placed or performed during the hospital encounter of 09/21/18   Troponin   Result Value Ref " Range    Troponin I <0.01 0.00 - 0.04 ng/mL   Btype Natriuretic Peptide   Result Value Ref Range    B Natriuretic Peptide 77 0 - 100 pg/mL   CBC with Differential   Result Value Ref Range    WBC 11.4 (H) 4.8 - 10.8 K/uL    RBC 5.19 4.70 - 6.10 M/uL    Hemoglobin 15.3 14.0 - 18.0 g/dL    Hematocrit 45.6 42.0 - 52.0 %    MCV 87.9 81.4 - 97.8 fL    MCH 29.5 27.0 - 33.0 pg    MCHC 33.6 (L) 33.7 - 35.3 g/dL    RDW 43.5 35.9 - 50.0 fL    Platelet Count 297 164 - 446 K/uL    MPV 11.2 9.0 - 12.9 fL    Neutrophils-Polys 61.30 44.00 - 72.00 %    Lymphocytes 31.70 22.00 - 41.00 %    Monocytes 5.40 0.00 - 13.40 %    Eosinophils 0.80 0.00 - 6.90 %    Basophils 0.40 0.00 - 1.80 %    Immature Granulocytes 0.40 0.00 - 0.90 %    Nucleated RBC 0.00 /100 WBC    Neutrophils (Absolute) 6.97 1.82 - 7.42 K/uL    Lymphs (Absolute) 3.60 1.00 - 4.80 K/uL    Monos (Absolute) 0.61 0.00 - 0.85 K/uL    Eos (Absolute) 0.09 0.00 - 0.51 K/uL    Baso (Absolute) 0.04 0.00 - 0.12 K/uL    Immature Granulocytes (abs) 0.04 0.00 - 0.11 K/uL    NRBC (Absolute) 0.00 K/uL   Complete Metabolic Panel (CMP)   Result Value Ref Range    Sodium 142 135 - 145 mmol/L    Potassium 3.5 (L) 3.6 - 5.5 mmol/L    Chloride 107 96 - 112 mmol/L    Co2 25 20 - 33 mmol/L    Anion Gap 10.0 0.0 - 11.9    Glucose 82 65 - 99 mg/dL    Bun 11 8 - 22 mg/dL    Creatinine 0.92 0.50 - 1.40 mg/dL    Calcium 9.8 8.5 - 10.5 mg/dL    AST(SGOT) 19 12 - 45 U/L    ALT(SGPT) 29 2 - 50 U/L    Alkaline Phosphatase 98 30 - 99 U/L    Total Bilirubin 0.4 0.1 - 1.5 mg/dL    Albumin 4.3 3.2 - 4.9 g/dL    Total Protein 7.5 6.0 - 8.2 g/dL    Globulin 3.2 1.9 - 3.5 g/dL    A-G Ratio 1.3 g/dL   Prothrombin Time   Result Value Ref Range    PT 13.0 12.0 - 14.6 sec    INR 0.97 0.87 - 1.13   APTT   Result Value Ref Range    APTT 25.8 24.7 - 36.0 sec   Lipase   Result Value Ref Range    Lipase 47 11 - 82 U/L   ESTIMATED GFR   Result Value Ref Range    GFR If African American >60 >60 mL/min/1.73 m 2    GFR If  Non African American >60 >60 mL/min/1.73 m 2   EKG (NOW)   Result Value Ref Range    Report       [facility] Emergency Dept.    Test Date:  2018  Pt Name:    JACKIE SMITH          Department: ER  MRN:        [id number]                      Room:  Gender:     Male                         Technician: [id number]  :        1985                   Requested By:ER TRIAGE PROTOCOL  Order #:    [id number]                    Reading MD:    Measurements  Intervals                                Axis  Rate:       71                           P:          58  NH:         128                          QRS:        77  QRSD:       80                           T:          48  QT:         408  QTc:        444    Interpretive Statements  SINUS RHYTHM  ST ELEVATION SUGGESTS PERICARDITIS  Compared to ECG 2018 08:34:49  ST (T wave) deviation now present       EKG interpretation twelve-lead tracing sinus rhythm rate 71 J-point elevation nonspecific changes no acute ischemia noted  COURSE & MEDICAL DECISION MAKING  Pertinent Labs & Imaging studies reviewed. (See chart for details)  Patient is presenting for evaluation of electric shock.  The patient has no evidence of any significance effects of the shock.  He has been observed in the emergency department and has resolution of his anxiety.  He has no indication for admission to the hospital he has no evidence of any burn no entrance or exit wound.  He is to return to the emergency department for any concerns    FINAL IMPRESSION  1.  Electric shock  2.   3.      Electronically signed by: Isidro Botello, 2018

## 2018-09-26 ENCOUNTER — HOSPITAL ENCOUNTER (EMERGENCY)
Dept: HOSPITAL 8 - ED | Age: 33
Discharge: HOME | End: 2018-09-26
Payer: COMMERCIAL

## 2018-09-26 VITALS — WEIGHT: 181.22 LBS | HEIGHT: 67 IN | BODY MASS INDEX: 28.44 KG/M2

## 2018-09-26 VITALS — DIASTOLIC BLOOD PRESSURE: 86 MMHG | SYSTOLIC BLOOD PRESSURE: 139 MMHG

## 2018-09-26 DIAGNOSIS — I10: ICD-10-CM

## 2018-09-26 DIAGNOSIS — K08.89: Primary | ICD-10-CM

## 2018-09-26 PROCEDURE — 99283 EMERGENCY DEPT VISIT LOW MDM: CPT

## 2018-11-03 ENCOUNTER — HOSPITAL ENCOUNTER (EMERGENCY)
Facility: MEDICAL CENTER | Age: 33
End: 2018-11-03
Attending: EMERGENCY MEDICINE

## 2018-11-03 VITALS
WEIGHT: 175.93 LBS | TEMPERATURE: 98 F | DIASTOLIC BLOOD PRESSURE: 78 MMHG | BODY MASS INDEX: 27.61 KG/M2 | OXYGEN SATURATION: 98 % | HEART RATE: 78 BPM | HEIGHT: 67 IN | RESPIRATION RATE: 16 BRPM | SYSTOLIC BLOOD PRESSURE: 121 MMHG

## 2018-11-03 DIAGNOSIS — R42 DIZZINESS: ICD-10-CM

## 2018-11-03 DIAGNOSIS — K70.30 ALCOHOLIC CIRRHOSIS, UNSPECIFIED WHETHER ASCITES PRESENT (HCC): ICD-10-CM

## 2018-11-03 LAB
ALBUMIN SERPL BCP-MCNC: 4.9 G/DL (ref 3.2–4.9)
ALBUMIN/GLOB SERPL: 1.6 G/DL
ALP SERPL-CCNC: 98 U/L (ref 30–99)
ALT SERPL-CCNC: 22 U/L (ref 2–50)
ANION GAP SERPL CALC-SCNC: 9 MMOL/L (ref 0–11.9)
AST SERPL-CCNC: 15 U/L (ref 12–45)
BASOPHILS # BLD AUTO: 0.4 % (ref 0–1.8)
BASOPHILS # BLD: 0.05 K/UL (ref 0–0.12)
BILIRUB SERPL-MCNC: 0.6 MG/DL (ref 0.1–1.5)
BUN SERPL-MCNC: 12 MG/DL (ref 8–22)
CALCIUM SERPL-MCNC: 10.4 MG/DL (ref 8.5–10.5)
CHLORIDE SERPL-SCNC: 106 MMOL/L (ref 96–112)
CO2 SERPL-SCNC: 24 MMOL/L (ref 20–33)
CREAT SERPL-MCNC: 0.84 MG/DL (ref 0.5–1.4)
EKG IMPRESSION: NORMAL
EOSINOPHIL # BLD AUTO: 0.08 K/UL (ref 0–0.51)
EOSINOPHIL NFR BLD: 0.7 % (ref 0–6.9)
ERYTHROCYTE [DISTWIDTH] IN BLOOD BY AUTOMATED COUNT: 46.9 FL (ref 35.9–50)
GLOBULIN SER CALC-MCNC: 3 G/DL (ref 1.9–3.5)
GLUCOSE SERPL-MCNC: 92 MG/DL (ref 65–99)
HCT VFR BLD AUTO: 49.4 % (ref 42–52)
HGB BLD-MCNC: 16.4 G/DL (ref 14–18)
IMM GRANULOCYTES # BLD AUTO: 0.04 K/UL (ref 0–0.11)
IMM GRANULOCYTES NFR BLD AUTO: 0.3 % (ref 0–0.9)
LYMPHOCYTES # BLD AUTO: 3.03 K/UL (ref 1–4.8)
LYMPHOCYTES NFR BLD: 25.5 % (ref 22–41)
MCH RBC QN AUTO: 30.4 PG (ref 27–33)
MCHC RBC AUTO-ENTMCNC: 33.2 G/DL (ref 33.7–35.3)
MCV RBC AUTO: 91.5 FL (ref 81.4–97.8)
MONOCYTES # BLD AUTO: 0.62 K/UL (ref 0–0.85)
MONOCYTES NFR BLD AUTO: 5.2 % (ref 0–13.4)
NEUTROPHILS # BLD AUTO: 8.05 K/UL (ref 1.82–7.42)
NEUTROPHILS NFR BLD: 67.9 % (ref 44–72)
NRBC # BLD AUTO: 0 K/UL
NRBC BLD-RTO: 0 /100 WBC
PLATELET # BLD AUTO: 279 K/UL (ref 164–446)
PMV BLD AUTO: 11.2 FL (ref 9–12.9)
POTASSIUM SERPL-SCNC: 3.6 MMOL/L (ref 3.6–5.5)
PROT SERPL-MCNC: 7.9 G/DL (ref 6–8.2)
RBC # BLD AUTO: 5.4 M/UL (ref 4.7–6.1)
SODIUM SERPL-SCNC: 139 MMOL/L (ref 135–145)
WBC # BLD AUTO: 11.9 K/UL (ref 4.8–10.8)

## 2018-11-03 PROCEDURE — 85025 COMPLETE CBC W/AUTO DIFF WBC: CPT

## 2018-11-03 PROCEDURE — 80074 ACUTE HEPATITIS PANEL: CPT

## 2018-11-03 PROCEDURE — 99284 EMERGENCY DEPT VISIT MOD MDM: CPT

## 2018-11-03 PROCEDURE — 80053 COMPREHEN METABOLIC PANEL: CPT

## 2018-11-03 PROCEDURE — 93005 ELECTROCARDIOGRAM TRACING: CPT

## 2018-11-03 PROCEDURE — 93005 ELECTROCARDIOGRAM TRACING: CPT | Performed by: EMERGENCY MEDICINE

## 2018-11-03 ASSESSMENT — PAIN SCALES - GENERAL: PAINLEVEL_OUTOF10: 2

## 2018-11-04 ENCOUNTER — PATIENT OUTREACH (OUTPATIENT)
Dept: HEALTH INFORMATION MANAGEMENT | Facility: OTHER | Age: 33
End: 2018-11-04

## 2018-11-04 LAB
HAV IGM SERPL QL IA: NEGATIVE
HBV CORE IGM SER QL: NEGATIVE
HBV SURFACE AG SER QL: NEGATIVE
HCV AB SER QL: NEGATIVE

## 2018-11-04 NOTE — ED PROVIDER NOTES
"ED Provider Note    Scribed for Dannie Joya M.D. by Fouzia Rojas. 11/3/2018, 9:28 PM.    Primary care provider: Jered Vogel M.D.  Means of arrival: Ambulance  History obtained from: Patient  History limited by: None    CHIEF COMPLAINT  Chief Complaint   Patient presents with   • Dizziness     Reports dizziness/lightheadedness since yesterday on and off, reports he was at work and \"I almost fell down when I stood up,\" so he came to ED tonight. Reports he is still dizzy at this time.      HPI  JACKIE SMITH is a 32 y.o. male who presents to the Emergency Department brought in by ambulance complaining of worsening dizziness described as lightheadedness onset 1 week. Per patient, \"I always feel bad\" but reports worsening symptoms over the past week. Associated symptoms include sharp upper right chest pain. Confirms a past medical history of cirrhosis from alcohol abuse. He denies still drinking and has been sober for 8-9 months. He also reports a medical history of gastritis and pancreatitis. Patient works at [employer].     REVIEW OF SYSTEMS  See HPI for further details. All other systems are negative.    PAST MEDICAL HISTORY   has a past medical history of Alcohol abuse; Alcohol abuse; Alcoholic cirrhosis (HCC); Anxiety; Asthma; Depression; ETOH abuse; Gastritis; Hypertension; Liver failure (HCC); Pancreatitis; and Ulcer.    SURGICAL HISTORY   has a past surgical history that includes appendectomy and appendectomy.    SOCIAL HISTORY  Social History   Substance Use Topics   • Smoking status: Former Smoker     Types: Cigarettes   • Smokeless tobacco: Never Used   • Alcohol use No      Comment: former ETOH, sober x8-9 months      History   Drug Use   • Types: Inhaled     Comment: marajuana       FAMILY HISTORY  History reviewed. No pertinent family history.    CURRENT MEDICATIONS  Reviewed. See Encounter Summary.     ALLERGIES  No Known Allergies    PHYSICAL EXAM  VITAL SIGNS: /88   " "Pulse 71   Temp 36.4 °C (97.6 °F) (Temporal)   Resp 14   Ht 1.702 m (5' 7\")   Wt 79.8 kg (175 lb 14.8 oz)   SpO2 98%   BMI 27.55 kg/m²     Constitutional: Alert in no apparent distress.  HENT: No signs of trauma, Bilateral external ears normal, Nose normal.   Eyes: Pupils are equal and reactive, Conjunctiva normal, Non-icteric.   Neck: Normal range of motion, No tenderness, Supple, No stridor.   Lymphatic: No lymphadenopathy noted.   Cardiovascular: Regular rate and rhythm, no murmurs.   Thorax & Lungs: Normal breath sounds, No respiratory distress, No wheezing, No chest tenderness.   Abdomen: Bowel sounds normal, Soft, No tenderness, No masses, No pulsatile masses. No peritoneal signs.  Skin: Warm, Dry, No erythema, No rash.   Back: No bony tenderness, No CVA tenderness.   Extremities: Intact distal pulses, No edema, No tenderness, No cyanosis  Musculoskeletal: Good range of motion in all major joints. No tenderness to palpation or major deformities noted.   Neurologic: Alert , Normal motor function, Normal sensory function, No focal deficits noted.   Psychiatric: Affect normal, Judgment normal, Mood normal.    DIAGNOSTIC STUDIES / PROCEDURES     LABS  Results for orders placed or performed during the hospital encounter of 11/03/18   CBC w/ Differential   Result Value Ref Range    WBC 11.9 (H) 4.8 - 10.8 K/uL    RBC 5.40 4.70 - 6.10 M/uL    Hemoglobin 16.4 14.0 - 18.0 g/dL    Hematocrit 49.4 42.0 - 52.0 %    MCV 91.5 81.4 - 97.8 fL    MCH 30.4 27.0 - 33.0 pg    MCHC 33.2 (L) 33.7 - 35.3 g/dL    RDW 46.9 35.9 - 50.0 fL    Platelet Count 279 164 - 446 K/uL    MPV 11.2 9.0 - 12.9 fL    Neutrophils-Polys 67.90 44.00 - 72.00 %    Lymphocytes 25.50 22.00 - 41.00 %    Monocytes 5.20 0.00 - 13.40 %    Eosinophils 0.70 0.00 - 6.90 %    Basophils 0.40 0.00 - 1.80 %    Immature Granulocytes 0.30 0.00 - 0.90 %    Nucleated RBC 0.00 /100 WBC    Neutrophils (Absolute) 8.05 (H) 1.82 - 7.42 K/uL    Lymphs (Absolute) 3.03 " 1.00 - 4.80 K/uL    Monos (Absolute) 0.62 0.00 - 0.85 K/uL    Eos (Absolute) 0.08 0.00 - 0.51 K/uL    Baso (Absolute) 0.05 0.00 - 0.12 K/uL    Immature Granulocytes (abs) 0.04 0.00 - 0.11 K/uL    NRBC (Absolute) 0.00 K/uL   Complete Metabolic Panel (CMP)   Result Value Ref Range    Sodium 139 135 - 145 mmol/L    Potassium 3.6 3.6 - 5.5 mmol/L    Chloride 106 96 - 112 mmol/L    Co2 24 20 - 33 mmol/L    Anion Gap 9.0 0.0 - 11.9    Glucose 92 65 - 99 mg/dL    Bun 12 8 - 22 mg/dL    Creatinine 0.84 0.50 - 1.40 mg/dL    Calcium 10.4 8.5 - 10.5 mg/dL    AST(SGOT) 15 12 - 45 U/L    ALT(SGPT) 22 2 - 50 U/L    Alkaline Phosphatase 98 30 - 99 U/L    Total Bilirubin 0.6 0.1 - 1.5 mg/dL    Albumin 4.9 3.2 - 4.9 g/dL    Total Protein 7.9 6.0 - 8.2 g/dL    Globulin 3.0 1.9 - 3.5 g/dL    A-G Ratio 1.6 g/dL   ESTIMATED GFR   Result Value Ref Range    GFR If African American >60 >60 mL/min/1.73 m 2    GFR If Non African American >60 >60 mL/min/1.73 m 2   EKG (NOW)   Result Value Ref Range    Report       [facility] Emergency Dept.    Test Date:  2018  Pt Name:    JACKIE SMITH          Department: ER  MRN:        [id number]                      Room:  Gender:     Male                         Technician: [id number]  :        1985                   Requested By:ER TRIAGE PROTOCOL  Order #:    [id number]                    Reading MD:    Measurements  Intervals                                Axis  Rate:       62                           P:          25  KS:         124                          QRS:        77  QRSD:       76                           T:          37  QT:         416  QTc:        423    Interpretive Statements  UNKNOWN RHYTHM, IRREGULAR RATE  53- 74  Compared to ECG 2018 20:49:54  Sinus rhythm no longer present  ST (T wave) deviation no longer present       All labs were reviewed by me.    EKG Interpretation  Interpreted by me at 9:37 PM     Rhythm:  Normal sinus rhythm   Rate:  62  Axis: normal  Interval: sinus arrhythmia   No acute ST-T wave changes  Clinical Impression: Resolved ST changes compared to EKG on 9/21    COURSE & MEDICAL DECISION MAKING  Nursing notes, VS, PMSFHx reviewed in chart.      9:28 PM - Patient seen and examined at bedside. Plan of care was discussed with the patient which is to check the liver enzymes, repeat labs, and EKG. He is instructed to follow up with [facility]. Ordered CBC with differential, CMP, hepatitis panel, and EKG to evaluate his symptoms.     10:35 PM - Recheck. Patient's dizziness has resolved. Discussed lab results with the patient who is happy with his lab draw. He was given ED return precautions and discharged home after interventions. Patient verbalized his understanding and agrees to the discharge instructions.     Decision Making:  This is a 32 y.o. year old male who presents with above complaint.  Patient states that he is primarily here for reevaluation of his known alcoholic liver cirrhosis.  He notes that he has not had laboratory evaluation and a significant amount of time and he is curious as to what his LFTs are currently.  Lab evaluation is benign.  Hepatitis panel is pending.  EKG is benign.  I suspect that the patient may be having some of his complaint secondary to some dehydration as he has had poor p.o. intake on an ongoing basis.  I have asked him to continue with oral hydration outpatient follow-up at local clinic as referred    DISPOSITION:  Patient will be discharged home in good condition.    Discharge Medications:  Discharge Medication List as of 11/3/2018 10:40 PM          The patient was discharged home (see d/c instructions) and told to return immediately for any signs or symptoms listed, or any worsening at all. The patient verbally agreed to the discharge precautions and follow-up plan which is documented in EPIC.      FINAL IMPRESSION  1. Dizziness    2. Alcoholic cirrhosis, unspecified whether ascites present  ([facility])          I, Fouzia Rojas (Scribe), am scribing for, and in the presence of, Dannie Joya M.D..    Electronically signed by: Fouzia Rojas ([email or web address]), 11/3/2018    Dannie GONZALEZ M.D. personally performed the services described in this documentation, as scribed by Fouzia Rojas in my presence, and it is both accurate and complete. C    The note accurately reflects work and decisions made by me.  Dannie Joya  11/4/2018  12:17 AM

## 2018-11-04 NOTE — PROGRESS NOTES
Placed discharge outreach phone call to pt s/p ER discharge 11/3/18.  Received recording stating that pt has not set up voicemail.  Discharge outreach letter mailed to pt.

## 2018-11-04 NOTE — ED TRIAGE NOTES
"Chief Complaint   Patient presents with   • Dizziness     Reports dizziness/lightheadedness since yesterday on and off, reports he was at work and \"I almost fell down when I stood up,\" so he came to ED tonight. Reports he is still dizzy at this time.      /88   Pulse 71   Temp 36.4 °C (97.6 °F) (Temporal)   Resp 14   Ht 1.702 m (5' 7\")   Wt 79.8 kg (175 lb 14.8 oz)   SpO2 98%   BMI 27.55 kg/m²     Pt to triage via WC, able to stand for weight with SBA. EKG completed in triage d/t dizziness. Reports he has an anxiety disorder, believes the episode above caused him to become anxious as well but that this is resolving since he called [facility]/arrived to ED. Pt returned to [facility], instructed to notify staff of worsening concerns.   "

## 2018-11-04 NOTE — LETTER
[place] [address]    November 4, 2018      Dear JACKIE SMITH,    [facility] wants to ensure your discharge home is safe and you or your loved ones have had all of your questions answered regarding your care after you leave the hospital.    Our discharge team was unsuccessful in our attempts to contact you telephonically and we wanted to be sure that you had a list of resources and contact information should you have any questions regarding your hospital stay, follow-up instructions, or active medical symptoms.    Questions or Concerns Regarding… Contact   Medical Questions Related to Your Discharge  (7 days a week, 8am-5pm) Contact a Nurse Care Coordinator   [phone]   Medical Questions Not Related to Your Discharge  (24 hours a day / 7 days a week)  Contact the Nurse Health Line   [phone]    Medications or Discharge Instructions Refer to your discharge packet   or contact your -[phone]   Follow-up Appointment(s) Schedule your appointment via Order[facility]   or contact Scheduling [phone]   Billing Review your statement via Order[employer]  or contact Billing [phone]   Medical Records Review your records via Order[employer]head   or contact Medical Records [phone]     You can also easily access your medical information, test results and upcoming appointments via the Order[employer]head free online health management tool. You can learn more and sign up at [email or web address]. For assistance setting up your Order[employer] account, please call [phone].    Once again, we want to ensure your discharge home is safe and that you have a clear understanding of any next steps in your care. If you have any questions or concerns, please do not hesitate to contact us, we are here for you. Thank you for choosing [facility] for your healthcare needs.    Sincerely,      Your [facility] Healthcare Team

## 2018-12-19 ENCOUNTER — HOSPITAL ENCOUNTER (EMERGENCY)
Facility: MEDICAL CENTER | Age: 33
End: 2018-12-19

## 2018-12-19 VITALS
DIASTOLIC BLOOD PRESSURE: 93 MMHG | OXYGEN SATURATION: 100 % | HEART RATE: 93 BPM | RESPIRATION RATE: 14 BRPM | BODY MASS INDEX: 27.28 KG/M2 | WEIGHT: 174.16 LBS | SYSTOLIC BLOOD PRESSURE: 130 MMHG | TEMPERATURE: 98.2 F

## 2018-12-19 LAB — EKG IMPRESSION: NORMAL

## 2018-12-19 PROCEDURE — 93005 ELECTROCARDIOGRAM TRACING: CPT

## 2018-12-19 PROCEDURE — 302449 STATCHG TRIAGE ONLY (STATISTIC)

## 2018-12-19 ASSESSMENT — PAIN SCALES - GENERAL: PAINLEVEL_OUTOF10: 0

## 2018-12-19 NOTE — ED TRIAGE NOTES
Chief Complaint   Patient presents with   • Syncope     pt reports waking dizzy, like the world was spinning/ went to the bathroom, and the had a syncopal episode on the way back to bedroom, pt denies injury/ reports still dizzy when walking.      Explained to pt triage process, made pt aware to tell this RN/staff of any changes/concerns, pt verbalized understanding of process and instructions given. Pt to ALBERTO kong.

## 2019-02-03 ENCOUNTER — HOSPITAL ENCOUNTER (EMERGENCY)
Facility: MEDICAL CENTER | Age: 34
End: 2019-02-03
Attending: EMERGENCY MEDICINE

## 2019-02-03 VITALS
WEIGHT: 175 LBS | RESPIRATION RATE: 16 BRPM | BODY MASS INDEX: 27.47 KG/M2 | DIASTOLIC BLOOD PRESSURE: 92 MMHG | HEIGHT: 67 IN | SYSTOLIC BLOOD PRESSURE: 147 MMHG | TEMPERATURE: 97.1 F | OXYGEN SATURATION: 96 % | HEART RATE: 94 BPM

## 2019-02-03 DIAGNOSIS — R21 RASH: ICD-10-CM

## 2019-02-03 PROCEDURE — 99282 EMERGENCY DEPT VISIT SF MDM: CPT

## 2019-02-03 RX ORDER — PERMETHRIN 50 MG/G
CREAM TOPICAL
Qty: 1 TUBE | Refills: 1 | Status: SHIPPED | OUTPATIENT
Start: 2019-02-03 | End: 2019-05-21 | Stop reason: CLARIF

## 2019-02-03 NOTE — ED TRIAGE NOTES
Ambulatory to triage with   Chief Complaint   Patient presents with   • Rash   States recurrent rash x several week. Concerned for scabies. States dry itchy patches to bilateral hands, wrists, and thighs. States cold. Chemicals from work, and dishwashing at work exacerbates rash. Does not appear to be scabies.

## 2019-02-03 NOTE — ED NOTES
Reviewed discharge instructions, pt verbalized understanding of instructions and medication. States he will schedule follow-up appointment with PCP and f/u with dermatology as needed. Denies further questions at this time. Pt ambulatory out of ER with stable gait.

## 2019-02-03 NOTE — ED PROVIDER NOTES
ED Provider Note    CHIEF COMPLAINT   Chief Complaint   Patient presents with   • Rash       HPI   Maori Meliton Medellin is a 33 y.o. male who presents with rash for the past several weeks.  It involves hands, arms, and thighs.  Occasionally will have lesions to his chest and back.  He denies family members with similar complaints at home.  Rash is itching in general.  He states that sometimes the small red bumps break open with clear fluid.  No pustules.  No fever.  No associated pain.  Patient stated he researched this on the Internet and feels it may be scabies.    REVIEW OF SYSTEMS   Constitutional: No fever  Skin itching rash    PAST MEDICAL HISTORY   Past Medical History:   Diagnosis Date   • Alcohol abuse    • Alcohol abuse     5-10 beers   • Alcoholic cirrhosis (HCC)    • Anxiety    • Asthma    • Depression    • ETOH abuse    • Gastritis    • Hypertension    • Liver failure (HCC)    • Pancreatitis    • Ulcer     unsure if abdominal/intestinal       FAMILY HISTORY  No family history on file.    SOCIAL HISTORY  Social History     Social History   • Marital status: Single     Spouse name: N/A   • Number of children: N/A   • Years of education: N/A     Social History Main Topics   • Smoking status: Former Smoker     Types: Cigarettes   • Smokeless tobacco: Never Used   • Alcohol use No      Comment: former ETOH, sober x8-9 months   • Drug use: Yes     Types: Inhaled      Comment: marajuana   • Sexual activity: Not on file     Other Topics Concern   • Not on file     Social History Narrative    ** Merged History Encounter **         ** Merged History Encounter **             SURGICAL HISTORY  Past Surgical History:   Procedure Laterality Date   • APPENDECTOMY     • PB APPENDECTOMY         CURRENT MEDICATIONS   Home Medications     Reviewed by Dalia Prieto R.N. (Registered Nurse) on 02/03/19 at 6715  Med List Status: Partial   Medication Last Dose Status   albuterol (PROVENTIL) 2.5 mg/0.5 mL Nebu Soln  Active  "  albuterol 108 (90 Base) MCG/ACT Aero Soln inhalation aerosol  Active   albuterol 108 (90 Base) MCG/ACT Aero Soln inhalation aerosol  Active   amLODIPine (NORVASC) 2.5 MG Tab  Active   gabapentin (NEURONTIN) 300 MG Cap  Active   gabapentin (NEURONTIN) 300 MG Cap  Active   lactulose 10 GM/15ML Solution  Active   lactulose 10 GM/15ML Solution  Active   lisinopril (PRINIVIL) 5 MG Tab  Active   omeprazole (PRILOSEC) 20 MG delayed-release capsule  Active   QUEtiapine (SEROQUEL) 100 MG Tab  Active   sertraline (ZOLOFT) 100 MG Tab  Active                ALLERGIES   No Known Allergies    PHYSICAL EXAM  VITAL SIGNS: /92   Pulse 94   Temp 36.2 °C (97.1 °F) (Temporal)   Resp 16   Ht 1.702 m (5' 7\")   Wt 79.4 kg (175 lb)   SpO2 96%   BMI 27.41 kg/m²   Constitutional: Well developed, Well nourished, No acute distress, Non-toxic appearance.   Cardiac: Normal heart rate, Normal rhythm   Pulmonary: Normal breath sounds  Skin: Scattered erythematous papules, some are excoriated.  No pustules or vesicles.  They do not fit dermatomal pattern.  No induration, no warmth.  Greatest area of rash inner upper thighs.  There is also lesions on the hands forearms shoulders.  Vascular: Normal capillary refill, No cyanosis.       COURSE & MEDICAL DECISION MAKING  Pertinent Labs & Imaging studies reviewed. (See chart for details)  Rash is possibly scabies.  Other possibilities include folliculitis, dermatitis, less likely fungal.  Patient will try permethrin cream twice, he is advised to see his primary doctor for recheck if not better for dermatology referral as needed.  Patient is well-appearing upon discharge.    FINAL IMPRESSION  1. Rash           Electronically signed by: Eriberto Erickson, 2/3/2019 9:07 AM    "

## 2019-04-12 ENCOUNTER — HOSPITAL ENCOUNTER (EMERGENCY)
Facility: MEDICAL CENTER | Age: 34
End: 2019-04-12
Attending: EMERGENCY MEDICINE

## 2019-04-12 ENCOUNTER — APPOINTMENT (OUTPATIENT)
Dept: RADIOLOGY | Facility: MEDICAL CENTER | Age: 34
End: 2019-04-12
Attending: EMERGENCY MEDICINE

## 2019-04-12 VITALS
BODY MASS INDEX: 28.03 KG/M2 | WEIGHT: 178.57 LBS | DIASTOLIC BLOOD PRESSURE: 99 MMHG | HEIGHT: 67 IN | RESPIRATION RATE: 14 BRPM | TEMPERATURE: 98 F | SYSTOLIC BLOOD PRESSURE: 126 MMHG | HEART RATE: 80 BPM | OXYGEN SATURATION: 97 %

## 2019-04-12 DIAGNOSIS — N45.1 EPIDIDYMITIS: ICD-10-CM

## 2019-04-12 LAB
APPEARANCE UR: CLEAR
BILIRUB UR QL STRIP.AUTO: NEGATIVE
COLOR UR: YELLOW
GLUCOSE UR STRIP.AUTO-MCNC: NEGATIVE MG/DL
KETONES UR STRIP.AUTO-MCNC: NEGATIVE MG/DL
LEUKOCYTE ESTERASE UR QL STRIP.AUTO: NEGATIVE
MICRO URNS: NORMAL
NITRITE UR QL STRIP.AUTO: NEGATIVE
PH UR STRIP.AUTO: 6 [PH]
PROT UR QL STRIP: NEGATIVE MG/DL
RBC UR QL AUTO: NEGATIVE
SP GR UR STRIP.AUTO: 1.02
UROBILINOGEN UR STRIP.AUTO-MCNC: 0.2 MG/DL

## 2019-04-12 PROCEDURE — A9270 NON-COVERED ITEM OR SERVICE: HCPCS | Performed by: EMERGENCY MEDICINE

## 2019-04-12 PROCEDURE — 99284 EMERGENCY DEPT VISIT MOD MDM: CPT

## 2019-04-12 PROCEDURE — 700102 HCHG RX REV CODE 250 W/ 637 OVERRIDE(OP): Performed by: EMERGENCY MEDICINE

## 2019-04-12 PROCEDURE — 81003 URINALYSIS AUTO W/O SCOPE: CPT

## 2019-04-12 PROCEDURE — 76870 US EXAM SCROTUM: CPT

## 2019-04-12 RX ORDER — SULFAMETHOXAZOLE AND TRIMETHOPRIM 800; 160 MG/1; MG/1
1 TABLET ORAL 2 TIMES DAILY
Qty: 14 TAB | Refills: 0 | Status: SHIPPED | OUTPATIENT
Start: 2019-04-12 | End: 2019-04-19

## 2019-04-12 RX ORDER — IBUPROFEN 600 MG/1
600 TABLET ORAL ONCE
Status: COMPLETED | OUTPATIENT
Start: 2019-04-12 | End: 2019-04-12

## 2019-04-12 RX ORDER — SULFAMETHOXAZOLE AND TRIMETHOPRIM 800; 160 MG/1; MG/1
1 TABLET ORAL 2 TIMES DAILY
Qty: 14 TAB | Refills: 0 | Status: SHIPPED | OUTPATIENT
Start: 2019-04-12 | End: 2019-04-12

## 2019-04-12 RX ADMIN — IBUPROFEN 600 MG: 600 TABLET ORAL at 17:39

## 2019-04-12 NOTE — ED TRIAGE NOTES
Pt amb to triage.  Chief Complaint   Patient presents with   • Testicle Pain     rt, x4d, denies swelling or discoloration, no improvement after abx

## 2019-04-13 NOTE — ED PROVIDER NOTES
ED Provider Note    Scribed for Asiya Seo M.D. by Eriberto Amos. 4/12/2019, 5:29 PM.    Primary care provider: Jered Vogel M.D.  Means of arrival: walk-in  History obtained from: Patient  History limited by: none    CHIEF COMPLAINT  Chief Complaint   Patient presents with   • Testicle Pain     rt, x4d, denies swelling or discoloration, no improvement after abx       HPI  Leo Medellin is a 33 y.o. male who presents to the Emergency Department with complaints of testicular pain and swelling gradual onset about 1 week ago. He describes the pain as an intermittent soreness primarily in his right testicle which began to spread to his left testicle yesterday. He adds the pain occasionally radiates into his right pelvis, right upper leg, and right lower quadrant. No alleviating or exacerbating factors noted. Denies any penile discharge or testicular swelling. He does not have any concerns for STD's. He saw his PCP 3 days ago where he was given a shot of Ceftriaxone and a prescription for 2 days of Azithromycin. He has finished this without any alleviation. His PCP also ordered a follow-up US for him to complete 2 weeks following this visit.    REVIEW OF SYSTEMS  Review of Systems   Genitourinary: Positive for testicular pain. Negative for discharge, penile pain, penile swelling and scrotal swelling.   All other systems reviewed and are negative.      PAST MEDICAL HISTORY   has a past medical history of Alcohol abuse; Alcohol abuse; Alcoholic cirrhosis (HCC); Anxiety; Asthma; Depression; ETOH abuse; Gastritis; Hypertension; Liver failure (HCC); Pancreatitis; and Ulcer.    SURGICAL HISTORY   has a past surgical history that includes appendectomy and appendectomy.    SOCIAL HISTORY  Social History   Substance Use Topics   • Smoking status: Former Smoker     Types: Cigarettes   • Smokeless tobacco: Never Used   • Alcohol use No      Comment: former ETOH, sober x8-9 months      History   Drug Use  "  • Types: Inhaled     Comment: marijuana       FAMILY HISTORY  No family history on file.    CURRENT MEDICATIONS  Reviewed.  See Encounter Summary.     ALLERGIES  No Known Allergies    PHYSICAL EXAM  VITAL SIGNS: /107   Pulse 84   Temp 36.9 °C (98.5 °F) (Temporal)   Resp 16   Ht 1.702 m (5' 7\")   Wt 81 kg (178 lb 9.2 oz)   SpO2 96%   BMI 27.97 kg/m²   Constitutional: Alert in no apparent distress.  HENT: No signs of trauma, Bilateral external ears normal, Nose normal.   Eyes: Pupils are equal and reactive, Conjunctiva normal, Non-icteric.   Neck: Normal range of motion, No tenderness, Supple, No stridor.   Lymphatic: No lymphadenopathy noted.   Cardiovascular: Regular rate and rhythm, no murmurs.   Thorax & Lungs: Normal breath sounds, No respiratory distress, No wheezing, No chest tenderness.   Abdomen: Soft, No tenderness, No masses, No pulsatile masses. No peritoneal signs.  : Right testicular tenderness without significant swelling or skin changes  Skin: Warm, Dry, No erythema, No rash.   Back: No bony tenderness, No CVA tenderness.   Extremities: Intact distal pulses, No edema, No tenderness, No cyanosis  Musculoskeletal: Good range of motion in all major joints. No tenderness to palpation or major deformities noted.   Neurologic: Alert , Normal motor function, Normal sensory function, No focal deficits noted.   Psychiatric: Affect normal, Judgment normal, Mood normal.       DIAGNOSTIC STUDIES / PROCEDURES     LABS  Results for orders placed or performed during the hospital encounter of 04/12/19   URINALYSIS CULTURE, IF INDICATED   Result Value Ref Range    Color Yellow     Character Clear     Specific Gravity 1.021 <1.035    Ph 6.0 5.0 - 8.0    Glucose Negative Negative mg/dL    Ketones Negative Negative mg/dL    Protein Negative Negative mg/dL    Bilirubin Negative Negative    Urobilinogen, Urine 0.2 Negative    Nitrite Negative Negative    Leukocyte Esterase Negative Negative    Occult Blood " Negative Negative    Micro Urine Req see below      All labs were reviewed by me.    RADIOLOGY  [id number]   Final Result      1.  No evidence of testicular mass or torsion.      2.  Possible epididymitis.      3.  Small left hydrocele.      4.  Several punctate calcifications are noted in each testicle.        The radiologist's interpretation of all radiological studies have been reviewed by me.    COURSE & MEDICAL DECISION MAKING  Pertinent Labs & Imaging studies reviewed. (See chart for details)    5:29 PM - Patient seen and examined at bedside. Patient will be treated with Ibuprofen 600 mg for pain. Ordered US-scrotum and UA to evaluate his symptoms.     7:13PM - Radiology results reviewed. The patient is stable for discharge at this time, and he will be prescribed with Bactrim 800-160 mg. Patient was given instructions to take 1 tablet by mouth BID for 7 days. He was instructed to return to the ED or contact his his primary care physician immediately if he begins developing a rash or any other side effects. He is comfortable and understanding of the plan for discharge and follow-up.     Decision Making:  This is a 33 y.o. year old male who presents with testicular pain.  Patient reportedly received treatment for STDs with azithromycin and ceftriaxone by his PCP.  This did not resulted in significant improvement in his pain.  On examination he had right testicular pain and tenderness without significant edema or skin changes to suggest torsion, hydrocele, or varicocele.  Differential diagnosis includes but is not limited to epididymitis, orchitis, inguinal hernia    Ultrasound was obtained showing no evidence of testicular mass or torsion.  Patient did have a small left hydrocele and punctate calcifications in each testicle likely unrelated to his pain.  Patient did have evidence of possible epididymitis, which I feel is likely the cause of his pain.  Urinalysis was not concerning for infection, but  given the patient's symptomatology and epididymitis noted on ultrasound, we will treat as such.  Patient artery received ceftriaxone and azithromycin prior to my evaluation.  He will be prescribed Bactrim for treatment of epididymitis.  Patient should follow-up with his primary care doctor and urology if symptoms fail to improve.    HTN/IDDM FOLLOW UP:  The patient is referred to a primary physician for blood pressure management, diabetic screening, and for all other preventive health concerns     The patient will return for new or worsening symptoms and is stable at the time of discharge.    DISPOSITION:  Patient will be discharged home in stable condition.    FOLLOW UP:  Jered Vogel M.D.  [address] [facility] [address]  [place] [address]  [phone]          Rupert Funez M.D.  [address] [facility]  [place] [address]  [phone]      If symptoms worsen      OUTPATIENT MEDICATIONS:  Discharge Medication List as of 4/12/2019  7:34 PM      START taking these medications    Details   sulfamethoxazole-trimethoprim (BACTRIM DS) 800-160 MG tablet Take 1 Tab by mouth 2 times a day for 7 days., Disp-14 Tab, R-0, Normal               FINAL IMPRESSION  1. Epididymitis          IEriberto (Scribe), am scribing for, and in the presence of, Asiya Seo M.D..    Electronically signed by: Eriberto Amos (Scribe), 4/12/2019    Asiya GONZALEZ M.D. personally performed the services described in this documentation, as scribed by Eriberto Amos in my presence, and it is both accurate and complete. C    The note accurately reflects work and decisions made by me.  Asiya Seo  4/12/2019  10:36 PM

## 2019-05-20 ENCOUNTER — HOSPITAL ENCOUNTER (EMERGENCY)
Facility: MEDICAL CENTER | Age: 34
End: 2019-05-20

## 2019-05-20 VITALS
TEMPERATURE: 97.3 F | HEART RATE: 102 BPM | SYSTOLIC BLOOD PRESSURE: 132 MMHG | RESPIRATION RATE: 18 BRPM | DIASTOLIC BLOOD PRESSURE: 81 MMHG | OXYGEN SATURATION: 96 %

## 2019-05-20 PROCEDURE — 302449 STATCHG TRIAGE ONLY (STATISTIC)

## 2019-05-21 ENCOUNTER — APPOINTMENT (OUTPATIENT)
Dept: RADIOLOGY | Facility: MEDICAL CENTER | Age: 34
End: 2019-05-21
Attending: EMERGENCY MEDICINE

## 2019-05-21 ENCOUNTER — HOSPITAL ENCOUNTER (EMERGENCY)
Facility: MEDICAL CENTER | Age: 34
End: 2019-05-21
Attending: EMERGENCY MEDICINE

## 2019-05-21 VITALS
BODY MASS INDEX: 28.41 KG/M2 | DIASTOLIC BLOOD PRESSURE: 96 MMHG | SYSTOLIC BLOOD PRESSURE: 142 MMHG | RESPIRATION RATE: 20 BRPM | WEIGHT: 181 LBS | HEIGHT: 67 IN | TEMPERATURE: 97.7 F | OXYGEN SATURATION: 95 % | HEART RATE: 78 BPM

## 2019-05-21 DIAGNOSIS — R07.89 CHEST WALL PAIN: ICD-10-CM

## 2019-05-21 DIAGNOSIS — R05.9 COUGH: ICD-10-CM

## 2019-05-21 DIAGNOSIS — R53.1 GENERAL WEAKNESS: ICD-10-CM

## 2019-05-21 LAB
ALBUMIN SERPL BCP-MCNC: 4 G/DL (ref 3.2–4.9)
ALBUMIN/GLOB SERPL: 1.3 G/DL
ALP SERPL-CCNC: 82 U/L (ref 30–99)
ALT SERPL-CCNC: 34 U/L (ref 2–50)
ANION GAP SERPL CALC-SCNC: 8 MMOL/L (ref 0–11.9)
AST SERPL-CCNC: 20 U/L (ref 12–45)
BASOPHILS # BLD AUTO: 0.5 % (ref 0–1.8)
BASOPHILS # BLD: 0.05 K/UL (ref 0–0.12)
BILIRUB SERPL-MCNC: 0.3 MG/DL (ref 0.1–1.5)
BNP SERPL-MCNC: 7 PG/ML (ref 0–100)
BUN SERPL-MCNC: 11 MG/DL (ref 8–22)
CALCIUM SERPL-MCNC: 9 MG/DL (ref 8.5–10.5)
CHLORIDE SERPL-SCNC: 109 MMOL/L (ref 96–112)
CO2 SERPL-SCNC: 23 MMOL/L (ref 20–33)
CREAT SERPL-MCNC: 0.75 MG/DL (ref 0.5–1.4)
D DIMER PPP IA.FEU-MCNC: <0.4 UG/ML (FEU) (ref 0–0.5)
EKG IMPRESSION: NORMAL
EOSINOPHIL # BLD AUTO: 0.13 K/UL (ref 0–0.51)
EOSINOPHIL NFR BLD: 1.4 % (ref 0–6.9)
ERYTHROCYTE [DISTWIDTH] IN BLOOD BY AUTOMATED COUNT: 45.9 FL (ref 35.9–50)
GLOBULIN SER CALC-MCNC: 3 G/DL (ref 1.9–3.5)
GLUCOSE SERPL-MCNC: 101 MG/DL (ref 65–99)
HCT VFR BLD AUTO: 47.3 % (ref 42–52)
HGB BLD-MCNC: 15.9 G/DL (ref 14–18)
IMM GRANULOCYTES # BLD AUTO: 0.05 K/UL (ref 0–0.11)
IMM GRANULOCYTES NFR BLD AUTO: 0.5 % (ref 0–0.9)
LYMPHOCYTES # BLD AUTO: 2.69 K/UL (ref 1–4.8)
LYMPHOCYTES NFR BLD: 28.1 % (ref 22–41)
MCH RBC QN AUTO: 30.8 PG (ref 27–33)
MCHC RBC AUTO-ENTMCNC: 33.6 G/DL (ref 33.7–35.3)
MCV RBC AUTO: 91.5 FL (ref 81.4–97.8)
MONOCYTES # BLD AUTO: 0.62 K/UL (ref 0–0.85)
MONOCYTES NFR BLD AUTO: 6.5 % (ref 0–13.4)
NEUTROPHILS # BLD AUTO: 6.04 K/UL (ref 1.82–7.42)
NEUTROPHILS NFR BLD: 63 % (ref 44–72)
NRBC # BLD AUTO: 0 K/UL
NRBC BLD-RTO: 0 /100 WBC
PLATELET # BLD AUTO: 267 K/UL (ref 164–446)
PMV BLD AUTO: 11 FL (ref 9–12.9)
POTASSIUM SERPL-SCNC: 4.1 MMOL/L (ref 3.6–5.5)
PROT SERPL-MCNC: 7 G/DL (ref 6–8.2)
RBC # BLD AUTO: 5.17 M/UL (ref 4.7–6.1)
SODIUM SERPL-SCNC: 140 MMOL/L (ref 135–145)
T4 FREE SERPL-MCNC: 0.92 NG/DL (ref 0.53–1.43)
TROPONIN I SERPL-MCNC: <0.01 NG/ML (ref 0–0.04)
TSH SERPL DL<=0.005 MIU/L-ACNC: 1.07 UIU/ML (ref 0.38–5.33)
WBC # BLD AUTO: 9.6 K/UL (ref 4.8–10.8)

## 2019-05-21 PROCEDURE — 71045 X-RAY EXAM CHEST 1 VIEW: CPT

## 2019-05-21 PROCEDURE — 93005 ELECTROCARDIOGRAM TRACING: CPT

## 2019-05-21 PROCEDURE — 99283 EMERGENCY DEPT VISIT LOW MDM: CPT

## 2019-05-21 PROCEDURE — 85379 FIBRIN DEGRADATION QUANT: CPT

## 2019-05-21 PROCEDURE — 84484 ASSAY OF TROPONIN QUANT: CPT

## 2019-05-21 PROCEDURE — 85025 COMPLETE CBC W/AUTO DIFF WBC: CPT

## 2019-05-21 PROCEDURE — 83880 ASSAY OF NATRIURETIC PEPTIDE: CPT

## 2019-05-21 PROCEDURE — 84443 ASSAY THYROID STIM HORMONE: CPT

## 2019-05-21 PROCEDURE — 80053 COMPREHEN METABOLIC PANEL: CPT

## 2019-05-21 PROCEDURE — 93005 ELECTROCARDIOGRAM TRACING: CPT | Performed by: EMERGENCY MEDICINE

## 2019-05-21 PROCEDURE — 84439 ASSAY OF FREE THYROXINE: CPT

## 2019-05-21 RX ORDER — DOXYCYCLINE HYCLATE 100 MG
100 TABLET ORAL 2 TIMES DAILY
COMMUNITY
Start: 2019-05-15 | End: 2019-08-06

## 2019-05-21 RX ORDER — AMOXICILLIN AND CLAVULANATE POTASSIUM 875; 125 MG/1; MG/1
1 TABLET, FILM COATED ORAL 2 TIMES DAILY
COMMUNITY
Start: 2019-05-15 | End: 2019-08-06

## 2019-05-21 ASSESSMENT — ENCOUNTER SYMPTOMS
FEVER: 1
VOMITING: 1
ABDOMINAL PAIN: 0
DIARRHEA: 1
SHORTNESS OF BREATH: 1
COUGH: 1

## 2019-05-21 NOTE — ED TRIAGE NOTES
"[facility] [place]  Chief Complaint   Patient presents with   • Chest Pain     RIGHT of sternum    • Back Pain     upper back   • Flank Pain     LEFT     Pt ambulatory to triage with above complaint. Pt states he was seen at [facility] and diagnosed with pneumonia of LEFT lung. Pt reports taking abx daily, \"I think I have one or two days left.\" Pt states s/s have only improved slightly, pt states \"my cough has gotten better, but that's it.\" Pt states when he was diagnosed with pneumonia, pain was on LEFT chest, but now reports pain just to the right of the sternum.. EKG completed in triage.    /96   Pulse 68   Temp 36.2 °C (97.2 °F) (Temporal)   Resp 18   Ht 1.702 m (5' 7\")   Wt 82.1 kg (181 lb)   SpO2 98%   BMI 28.35 kg/m²     Pt informed of triage process and encouraged to notify staff of any changes or concerns. Pt verbalized understanding of instructions. Apologized for long wait time. Pt placed back in lobby.     "

## 2019-05-21 NOTE — ED NOTES
Med rec complete per patient and [facility] clinic  Allergies reviewed    Patient is currently on 2 antibiotics

## 2019-05-21 NOTE — ED NOTES
Pt has been provided written and verbal education for cough and chest pain. Worsening s/s discussed. Pt educated on where to follow up and when to return to ED. All DC instructions with pt at time of DC.

## 2019-05-21 NOTE — ED PROVIDER NOTES
ED Provider Note    Scribed for David Nieto M.D. by Marcial Smith. 5/21/2019, 9:54 AM.    Primary care provider: Jered Vogel M.D.  Means of arrival: walk in  History obtained from: patient  History limited by: none    CHIEF COMPLAINT  Chief Complaint   Patient presents with   • Chest Pain     RIGHT of sternum    • Back Pain     upper back   • Flank Pain     LEFT       HPI  Leo Medellin is a 33 y.o. male who presents to the Emergency Department complaining of persistent and gradually worsening right sternal chest pain for the last 2 days. He describes his pain as sharp that is exacerbated with cold air. Patient reports associated shortness of breath, cough, fatigue, vomiting, diarrhea, subjective fever. He state that he has having chest discomfort since last week and was evaluated in the [facility]. Patient was diagnosed with pneumonia 5 days ago and was prescribed antibiotics for discahrge. Patient states that he started this course of antibiotics and returned to work, subsequently exacerbating his symptoms, prompting him to come to the ED for evaluation. He reports a history of gastritis, cirrhosis secondary to previous alcohol abuse. Patient denies leg swelling, abdominal pain.      REVIEW OF SYSTEMS  Review of Systems   Constitutional: Positive for fever and malaise/fatigue.   Respiratory: Positive for cough and shortness of breath.    Cardiovascular: Positive for chest pain.   Gastrointestinal: Positive for diarrhea and vomiting. Negative for abdominal pain.   Musculoskeletal:        Negative leg swelling    All other systems reviewed and are negative.    PAST MEDICAL HISTORY   has a past medical history of Alcohol abuse; Alcohol abuse; Alcoholic cirrhosis (HCC); Anxiety; Asthma; Depression; ETOH abuse; Gastritis; Hypertension; Liver failure (HCC); Pancreatitis; and Ulcer.    SURGICAL HISTORY   has a past surgical history that includes appendectomy and appendectomy.    SOCIAL  "HISTORY  Social History   Substance Use Topics   • Smoking status: Former Smoker     Types: Cigarettes   • Smokeless tobacco: Never Used   • Alcohol use No      Comment: former ETOH, sober x8-9 months      History   Drug Use   • Types: Inhaled     Comment: lianne       FAMILY HISTORY  None    CURRENT MEDICATIONS  Home Medications     Reviewed by Nguyễn Dallas (Pharmacy Tech) on 05/21/19 at 1020  Med List Status: Complete   Medication Last Dose Status   albuterol 108 (90 Base) MCG/ACT Aero Soln inhalation aerosol 3 DAYS Active   amoxicillin-clavulanate (AUGMENTIN) 875-125 MG Tab 5/20/2019 Active   doxycycline (VIBRAMYCIN) 100 MG Tab 5/20/2019 Active                ALLERGIES  No Known Allergies    PHYSICAL EXAM  VITAL SIGNS: /96   Pulse 68   Temp 36.2 °C (97.2 °F) (Temporal)   Resp 18   Ht 1.702 m (5' 7\")   Wt 82.1 kg (181 lb)   SpO2 98%   BMI 28.35 kg/m²     Constitutional: Well developed, Well nourished, No acute distress, Non-toxic appearance.   HENT: Normocephalic, Atraumatic, Bilateral external ears normal, oropharynx moist, No oral exudates, Nose normal.   Eyes:conjunctiva is normal, there are no signs of exudate.   Neck: Supple, no meningeal signs.  Lymphatic: No lymphadenopathy noted.   Cardiovascular: Regular rate and rhythm without murmurs gallops or rubs.   Thorax & Lungs: No respiratory distress. Breathing comfortably. Lungs are clear to auscultation bilaterally, there are no wheezes no rales. Chest wall is nontender.  Abdomen: Soft, nontender, nondistended. Bowel sounds are present.   Skin: Warm, Dry, No erythema,   Back: No tenderness, No CVA tenderness.  Musculoskeletal: Good range of motion in all major joints. No tenderness to palpation or major deformities noted. Intact distal pulses, no clubbing, no cyanosis, no edema,   Neurologic: Alert & oriented x 3, Moving all extremities. No gross abnormalities.    Psychiatric: Affect normal, Judgment normal, Mood normal. "     LABS  Results for orders placed or performed during the hospital encounter of 05/21/19   CBC with Differential   Result Value Ref Range    WBC 9.6 4.8 - 10.8 K/uL    RBC 5.17 4.70 - 6.10 M/uL    Hemoglobin 15.9 14.0 - 18.0 g/dL    Hematocrit 47.3 42.0 - 52.0 %    MCV 91.5 81.4 - 97.8 fL    MCH 30.8 27.0 - 33.0 pg    MCHC 33.6 (L) 33.7 - 35.3 g/dL    RDW 45.9 35.9 - 50.0 fL    Platelet Count 267 164 - 446 K/uL    MPV 11.0 9.0 - 12.9 fL    Neutrophils-Polys 63.00 44.00 - 72.00 %    Lymphocytes 28.10 22.00 - 41.00 %    Monocytes 6.50 0.00 - 13.40 %    Eosinophils 1.40 0.00 - 6.90 %    Basophils 0.50 0.00 - 1.80 %    Immature Granulocytes 0.50 0.00 - 0.90 %    Nucleated RBC 0.00 /100 WBC    Neutrophils (Absolute) 6.04 1.82 - 7.42 K/uL    Lymphs (Absolute) 2.69 1.00 - 4.80 K/uL    Monos (Absolute) 0.62 0.00 - 0.85 K/uL    Eos (Absolute) 0.13 0.00 - 0.51 K/uL    Baso (Absolute) 0.05 0.00 - 0.12 K/uL    Immature Granulocytes (abs) 0.05 0.00 - 0.11 K/uL    NRBC (Absolute) 0.00 K/uL   Complete Metabolic Panel (CMP)   Result Value Ref Range    Sodium 140 135 - 145 mmol/L    Potassium 4.1 3.6 - 5.5 mmol/L    Chloride 109 96 - 112 mmol/L    Co2 23 20 - 33 mmol/L    Anion Gap 8.0 0.0 - 11.9    Glucose 101 (H) 65 - 99 mg/dL    Bun 11 8 - 22 mg/dL    Creatinine 0.75 0.50 - 1.40 mg/dL    Calcium 9.0 8.5 - 10.5 mg/dL    AST(SGOT) 20 12 - 45 U/L    ALT(SGPT) 34 2 - 50 U/L    Alkaline Phosphatase 82 30 - 99 U/L    Total Bilirubin 0.3 0.1 - 1.5 mg/dL    Albumin 4.0 3.2 - 4.9 g/dL    Total Protein 7.0 6.0 - 8.2 g/dL    Globulin 3.0 1.9 - 3.5 g/dL    A-G Ratio 1.3 g/dL   Troponin STAT   Result Value Ref Range    Troponin I <0.01 0.00 - 0.04 ng/mL   Btype Natriuretic Peptide (BNP)   Result Value Ref Range    B Natriuretic Peptide 7 0 - 100 pg/mL   D-Dimer   Result Value Ref Range    D-Dimer Screen <0.40 0.00 - 0.50 ug/mL (FEU)   TSH (for screening thyroid dysfunction)   Result Value Ref Range    TSH 1.070 0.380 - 5.330 uIU/mL   Free  Thyroxine (add to TSH for patients with existing thyroid dysfunction)   Result Value Ref Range    Free T-4 0.92 0.53 - 1.43 ng/dL   ESTIMATED GFR   Result Value Ref Range    GFR If African American >60 >60 mL/min/1.73 m 2    GFR If Non African American >60 >60 mL/min/1.73 m 2   EKG (NOW)   Result Value Ref Range    Report       [facility] Emergency Dept.    Test Date:  2019  Pt Name:    JACKIE SMITH          Department: ER  MRN:        [id number]                      Room:  Gender:     Male                         Technician: [id number]  :        1985                   Requested By:ER TRIAGE PROTOCOL  Order #:    [id number]                    Reading MD: YUE MARTINEZ MD    Measurements  Intervals                                Axis  Rate:       77                           P:          33  NM:         128                          QRS:        79  QRSD:       78                           T:          40  QT:         372  QTc:        421    Interpretive Statements  SINUS RHYTHM  Compared to ECG 2018 11:59:54  Sinus arrhythmia no longer present  T-wave abnormality no longer present  ST (T wave) deviation still present  non specific st elevations diffuse, unchanged from prior  Electronically Signed On 2019 9:52:25 PDT by YUE MARTINEZ MD     All labs reviewed by me.    EKG  Interpreted by me as above.     RADIOLOGY  DX-CHEST-PORTABLE (1 VIEW)   Final Result      No pulmonary consolidation. Medial right lung base linear atelectasis.      The radiologist's interpretation of all radiological studies have been reviewed by me.    COURSE & MEDICAL DECISION MAKING  Pertinent Labs & Imaging studies reviewed. (See chart for details)    9:54 AM - Patient seen and examined at bedside. Discussed his evaluation in the ED with labs before expsure to radiology. His symptoms are likely continued from his previous diagnosis of pneumonia. He will be evaluated for rule out of emergent  "processes. Patient verbalizes understanding and agreement to this plan of care. Ordered X chest, CBC with differential, CMP, Troponin, BNP, D dimer, TSH, Free thyroxine to evaluate his symptoms. The differential diagnoses include but are not limited to: pneumonia, PE, ACS, CHF rule out. BNP ordered for heart failure rule out.     12:34 PM - Recheck: Patient re-evaluated at beside. Patient reports that his symptoms are unchanged. Patient's diagnostic results discussed in detail. Discussed patient's condition and treatment plan. He is instructed to continue the course of antibiotics he was previously prescribed. Patient will be discharged with instructions and provided with strict return precautions. Advised to follow up with his primary. Instructed to return to Emergency Department immediately if any new or worsening symptoms.    Discharge vitals: /96   Pulse 78   Temp 36.5 °C (97.7 °F) (Temporal)   Resp 20   Ht 1.702 m (5' 7\")   Wt 82.1 kg (181 lb)   SpO2 95%   BMI 28.35 kg/m²     Decision Making:  Patient presents ED for evaluation.  Clinically because the patient symptomatology concern for acute coronary syndrome pulmonary embolism pneumonia are there.  EKG laboratory studies show no signs of elevated troponin d-dimer is negative so I feel the likelihood of a pulmonary embolism is less than 1%.  At this point I feel the patient most likely is still having a postviral tusses with chest wall pain.  Recommend Tylenol ibuprofen for pain control to follow his primary care physician for recheck in 1 week return as needed.    The patient will return for new or worsening symptoms and is stable at the time of discharge.    The patient is referred to a primary physician for blood pressure management, diabetic screening, and for all other preventative health concerns.    DISPOSITION:  Patient will be discharged home in stable condition.    FOLLOW UP:  Jered Vogel M.D.  [address] [place] " [address]  [phone]    Schedule an appointment as soon as possible for a visit in 1 week  For re-check, For further evaluation      FINAL IMPRESSION  1. Chest wall pain    2. General weakness    3. Cough          IMarcial (Scribe), am scribing for, and in the presence of, David Nieto M.D..    Electronically signed by: Marcial Smith (Scribe), 5/21/2019    IDavid M.D. personally performed the services described in this documentation, as scribed by Marcial Smith in my presence, and it is both accurate and complete. C    The note accurately reflects work and decisions made by me.  David Nieto  5/21/2019  3:29 PM

## 2019-06-24 NOTE — ED PROVIDER NOTES
ED Provider Note    Scribed for Sarina De La Rosa M.D. by Yokasta Fortune. 8/9/2018, 9:13 AM.    Primary care provider: Jered Vogel M.D.  Means of arrival: Walk in  History obtained from: Patient  History limited by: None    CHIEF COMPLAINT  •  Chest Pain    HPI  Leo Medellin is a 32 y.o. male who presents to the Emergency Department for chest pain with an onset of 5 days. Symptoms developed six days ago with flu-like symptoms including cough, fevers, nausea, fatigue and myalgias. He began to experience intermittent, substernal chest pressure and shortness of breath five days ago. Symptoms are exacerbated with exertion. Negative for sore throat, leg swelling or vomiting. No prior history of similar chest pain or cardiac disease. He is a former smoker.       REVIEW OF SYSTEMS  Pertinent positives include cough, fevers, nausea, fatigue, myalgias, substernal chest pain and shortness of breath. Pertinent negatives include no sore throat, leg swelling or vomiting. All other systems reviewed and negative. See HPI for further details. C.      PAST MEDICAL HISTORY  Patient has a past medical history of Alcohol abuse; Alcohol abuse; Alcoholic cirrhosis (HCC); Anxiety; Asthma; Depression; ETOH abuse; Gastritis; Hypertension; Liver failure (HCC); Pancreatitis; and Ulcer.  No prior history of cardiac disease. He is a former smoker.       SURGICAL HISTORY  Patient has a past surgical history that includes appendectomy and appendectomy.      SOCIAL HISTORY  Social History   Substance Use Topics   • Smoking status: Former Smoker     Types: Cigarettes   • Smokeless tobacco: Never Used   • Alcohol use No      History   Drug Use   • Types: Inhaled     Comment: lianne,       FAMILY HISTORY  History reviewed. No pertinent family history.      CURRENT MEDICATIONS  •  amLODIPine (NORVASC) 2.5 MG Tab, Take 1 Tab by mouth 2 Times a Day., Disp: 30 Tab, Rfl: 0  •  omeprazole (PRILOSEC) 20 MG delayed-release capsule,  Provided [facility] fertility resources list, see Pt Instructions. She will call to obtain appt in the am with a clinic in network with insurance if possible and let me know fax # to send records to and if she needs assistance expediting consult appt.    "Take 1 Cap by mouth every day., Disp: 30 Cap, Rfl: 0  •  lisinopril (PRINIVIL) 5 MG Tab, Take 5 mg by mouth every day., Disp: , Rfl:   •  lactulose 10 GM/15ML Solution, Take 10 g by mouth 2 times a day., Disp: , Rfl:   •  gabapentin (NEURONTIN) 300 MG Cap, Take 300 mg by mouth every evening., Disp: , Rfl:   •  albuterol 108 (90 Base) MCG/ACT Aero Soln inhalation aerosol, Inhale 2 Puffs by mouth every 6 hours as needed for Shortness of Breath., Disp: , Rfl:   •  albuterol (PROVENTIL) 2.5 mg/0.5 mL Nebu Soln, by Nebulization route ONCE (RT)., Disp: , Rfl:   •  gabapentin (NEURONTIN) 300 MG Cap, Take 300 mg by mouth 3 times a day., Disp: , Rfl:   •  QUEtiapine (SEROQUEL) 100 MG Tab, Take 100 mg by mouth 2 times a day., Disp: , Rfl:   •  sertraline (ZOLOFT) 100 MG Tab, Take 100 mg by mouth every day., Disp: , Rfl:   •  lactulose 10 GM/15ML Solution, Take 20 g by mouth 2 times a day., Disp: , Rfl:        ALLERGIES  None      PHYSICAL EXAM  VITAL SIGNS: /97   Pulse (!) 102   Temp 37.1 °C (98.8 °F)   Resp 18   Ht 1.702 m (5' 7\")   Wt 80.6 kg (177 lb 11.1 oz)   SpO2 95%   BMI 27.83 kg/m²       Constitutional:  No distress, able to answer questions  HENT: Nose is normal in appearance without rhinorrhea, external ears are normal,  moist mucous membranes  Eyes: Anicteric,  pupils are equal round and reactive, there is no conjunctival drainage or pallor   Neck: The trachea is midline, there is no obvious mass or meningeal signs  Cardiovascular: Equal radial pulsation, regular rate and rhythm without murmurs gallops or rubs  Thorax & Lungs: Respiratory rate and effort are normal. There is normal chest excursion with respiration. No  rhonchi or rales noted. Wheezing to left base.  Abdomen: Abdomen is normal in appearance, Nontender, normal bowel sounds, no pain with cough, nonpulsatile  :  No CVA tenderness to palpation  Musculoskeletal: No deformities noted in all 4 extremities. Actively moves all 4 " extremities  Skin: Visualized skin is warm, no erythema, no rash.  Neurologic:  Cranial nerves II through XII are intact there is no focal abnormality noted.  Psychiatric: Normal mood and mentation      DIAGNOSTIC STUDIES / PROCEDURES    LABS  Results for orders placed or performed during the hospital encounter of 08/09/18   CBC with Differential   Result Value Ref Range    WBC 11.7 (H) 4.8 - 10.8 K/uL    RBC 5.14 4.70 - 6.10 M/uL    Hemoglobin 15.9 14.0 - 18.0 g/dL    Hematocrit 46.5 42.0 - 52.0 %    MCV 90.5 81.4 - 97.8 fL    MCH 30.9 27.0 - 33.0 pg    MCHC 34.2 33.7 - 35.3 g/dL    RDW 44.8 35.9 - 50.0 fL    Platelet Count 269 164 - 446 K/uL    MPV 11.3 9.0 - 12.9 fL    Neutrophils-Polys 68.50 44.00 - 72.00 %    Lymphocytes 22.20 22.00 - 41.00 %    Monocytes 7.50 0.00 - 13.40 %    Eosinophils 1.20 0.00 - 6.90 %    Basophils 0.30 0.00 - 1.80 %    Immature Granulocytes 0.30 0.00 - 0.90 %    Nucleated RBC 0.00 /100 WBC    Neutrophils (Absolute) 8.00 (H) 1.82 - 7.42 K/uL    Lymphs (Absolute) 2.59 1.00 - 4.80 K/uL    Monos (Absolute) 0.88 (H) 0.00 - 0.85 K/uL    Eos (Absolute) 0.14 0.00 - 0.51 K/uL    Baso (Absolute) 0.04 0.00 - 0.12 K/uL    Immature Granulocytes (abs) 0.04 0.00 - 0.11 K/uL    NRBC (Absolute) 0.00 K/uL   Complete Metabolic Panel (CMP)   Result Value Ref Range    Sodium 137 135 - 145 mmol/L    Potassium 4.1 3.6 - 5.5 mmol/L    Chloride 107 96 - 112 mmol/L    Co2 21 20 - 33 mmol/L    Anion Gap 9.0 0.0 - 11.9    Glucose 105 (H) 65 - 99 mg/dL    Bun 12 8 - 22 mg/dL    Creatinine 0.85 0.50 - 1.40 mg/dL    Calcium 9.5 8.5 - 10.5 mg/dL    AST(SGOT) 19 12 - 45 U/L    ALT(SGPT) 24 2 - 50 U/L    Alkaline Phosphatase 102 (H) 30 - 99 U/L    Total Bilirubin 0.4 0.1 - 1.5 mg/dL    Albumin 4.6 3.2 - 4.9 g/dL    Total Protein 7.5 6.0 - 8.2 g/dL    Globulin 2.9 1.9 - 3.5 g/dL    A-G Ratio 1.6 g/dL   Prothrombin Time (PT/INR)   Result Value Ref Range    PT 13.1 12.0 - 14.6 sec    INR 1.02 0.87 - 1.13   APTT   Result  Value Ref Range    APTT 22.1 (L) 24.7 - 36.0 sec   Lipase   Result Value Ref Range    Lipase 43 11 - 82 U/L   Troponin STAT   Result Value Ref Range    Troponin I <0.01 0.00 - 0.04 ng/mL   LACTIC ACID   Result Value Ref Range    Lactic Acid 1.4 0.5 - 2.0 mmol/L   ESTIMATED GFR   Result Value Ref Range    GFR If African American >60 >60 mL/min/1.73 m 2    GFR If Non African American >60 >60 mL/min/1.73 m 2   EKG (NOW)   Result Value Ref Range    Report       [facility] Emergency Dept.    Test Date:  2018  Pt Name:    JACKIE SMITH          Department: ER  MRN:        [id number]                      Room:  Gender:     Male                         Technician: [id number]  :        1985                   Requested By:ER TRIAGE PROTOCOL  Order #:    [id number]                    Reading MD:    Measurements  Intervals                                Axis  Rate:       97                           P:          40  OR:         124                          QRS:        77  QRSD:       72                           T:          36  QT:         344  QTc:        437    Interpretive Statements  SINUS RHYTHM  Compared to ECG 2018 18:35:34  ST (T wave) deviation no longer present        All labs reviewed by me.      EKG  I interpreted this EKG myself.  This is a 12-lead study.  The rhythm is sinus with a rate of 97.  There are no ST segment nor T wave abnormalities.  Interpretation: No ST segment elevation myocardial infarction. No significant change when compared to prior EKG.      RADIOLOGY  DX-CHEST-PORTABLE (1 VIEW)   Final Result      No acute cardiopulmonary abnormality. No radiographic evidence of pneumonia.        The radiologist's interpretation of all radiological studies have been reviewed by me.      COURSE & MEDICAL DECISION MAKING  Nursing notes and vital signs were reviewed. (See chart for details)  History was obtained from the patient.     9:14 AM The patient's records were obtained  "and reviewed indicating an admission for chest pain in 06/2018.     9:33 AM The patient presents with chest pain, and the differential diagnosis includes but is not limited to influenza, pneumonia or GERD.       9:14 AM Initial orders in the Emergency Department included XR chest, EKG and laboratory testing: estimated GFR, CBC with differential, CMP, BNP, prothrombin time, APTT, lipase, troponin and lactic acid. Initial treatment in the Emergency Department included 4 mg of Zofran IV.    ED testing reveals a negative troponin, normal lipase or normal lactic acid. XR chest shows no evidence of pneumonia.    10:13 AM On repeat evaluation of the patient, he continues to complain of chest discomfort. He will be treated with 15 mL of GI cocktail PO.      10:38 AM Re-evaluated at bedside. He is feeling improved. He will be discharged with prescriptions for Albuterol and Monodox. Antibiotics will treat for a low respiratory infection.    Additional work-up planned includes following up with Dr. Vogel.  Advised he use Tylenol and Ibuprofen for fevers and myalgias. Recommended he remain hydrated with plenty of fluids. This was discussed with the patient who verbalized his agreement.    The patient was discharged home with an information sheet on a cough and told to return immediately for any signs or symptoms listed, but specifically if he experiences chest pain, shortness of breath, wheezing or any additional concerns.  The patient agreed to the discharge precautions and follow-up plan which is documented in EPIC.    Patient is stable for discharge. Vital signs were reviewed: Blood pressure 141/84, pulse 92, temperature 37.6 °C (99.6 °F), resp. rate 18, height 1.702 m (5' 7\"), weight 80.6 kg (177 lb 11.1 oz), SpO2 96 %.         DISPOSITION:  Patient will be discharged home in stable condition.      FOLLOW UP:  Jered Vogel M.D.  [address] [place] [id number]  [phone]    Schedule an appointment as " soon as possible for a visit in 2 days      The patient is referred to a primary physician for blood pressure management, diabetic screening, and for all other preventative health concerns.      OUTPATIENT MEDICATIONS:  New Prescriptions    ALBUTEROL 108 (90 BASE) MCG/ACT AERO SOLN INHALATION AEROSOL    Inhale 2 Puffs by mouth every 6 hours as needed for Shortness of Breath.    DOXYCYCLINE (MONODOX) 100 MG CAPSULE    Take 1 Cap by mouth 2 times a day for 10 days.          FINAL IMPRESSION  1. Lower respiratory infection (e.g., bronchitis, pneumonia, pneumonitis, pulmonitis)         Yokasta GONZALEZ (Scribe), am scribing for, and in the presence of, Sarina De La Rosa M.D.    Electronically signed by: Yokasta Fortune (Elizabeth), 8/9/2018    ISarina M.D. personally performed the services described in this documentation, as scribed by Yokasta Fortune in my presence, and it is both accurate and complete.    The note accurately reflects work and decisions made by me.  Sarina De La Rosa  8/9/2018  1:15 PM

## 2019-07-12 ENCOUNTER — HOSPITAL ENCOUNTER (OUTPATIENT)
Dept: RADIOLOGY | Facility: MEDICAL CENTER | Age: 34
End: 2019-07-12
Attending: NURSE PRACTITIONER

## 2019-07-12 DIAGNOSIS — K70.10 ALCOHOLIC HEPATITIS WITHOUT ASCITES: ICD-10-CM

## 2019-08-06 ENCOUNTER — HOSPITAL ENCOUNTER (EMERGENCY)
Facility: MEDICAL CENTER | Age: 34
End: 2019-08-06
Attending: EMERGENCY MEDICINE

## 2019-08-06 VITALS
HEART RATE: 75 BPM | BODY MASS INDEX: 28.89 KG/M2 | TEMPERATURE: 97 F | RESPIRATION RATE: 16 BRPM | SYSTOLIC BLOOD PRESSURE: 136 MMHG | OXYGEN SATURATION: 95 % | DIASTOLIC BLOOD PRESSURE: 68 MMHG | WEIGHT: 184.08 LBS | HEIGHT: 67 IN

## 2019-08-06 DIAGNOSIS — H72.91 PERFORATION OF RIGHT TYMPANIC MEMBRANE: ICD-10-CM

## 2019-08-06 DIAGNOSIS — H66.90 ACUTE OTITIS MEDIA, UNSPECIFIED OTITIS MEDIA TYPE: ICD-10-CM

## 2019-08-06 PROCEDURE — 99283 EMERGENCY DEPT VISIT LOW MDM: CPT

## 2019-08-06 RX ORDER — AMOXICILLIN AND CLAVULANATE POTASSIUM 875; 125 MG/1; MG/1
1 TABLET, FILM COATED ORAL 2 TIMES DAILY
Qty: 14 TAB | Refills: 0 | Status: SHIPPED | OUTPATIENT
Start: 2019-08-06 | End: 2019-08-13

## 2019-08-06 NOTE — LETTER
Emergency Services     August 6, 2019 // 06:32AM    Patient: Leo Medellin   YOB: 1985   Date of Visit: 8/6/2019       To Whom It May Concern:    Leo Medellin was seen and treated in our emergency department on 8/6/2019. Please excuse him from work today.    Sincerely,     MARTHA LEAHY M.D.  [facility], EMERGENCY DEPT  Dept: [phone]

## 2019-08-06 NOTE — ED PROVIDER NOTES
"ED Provider Note    Scribed for Cameron Lu M.D. by Cameron Lu. 8/6/2019  6:15 AM    CHIEF COMPLAINT  Chief Complaint   Patient presents with   • Ear Pain     Pt complains of ear pain that started yesterday. Pt states that the ear pain and pressure woke him from sleep. Pt states he feels like there is water in his ear and he is having difficulty hearing.        HPI  Leo Medellin is a 33 y.o. male who presents to the Emergency Department with right ear pain that started yesterday.  He reports that it is pressure-like, and woke him from sleep.  He has a sensation of water in his ear, with decreased hearing.  He denies any trauma.  He says that he has not recently been cleaning his ears with Q-tips.  He denies fevers or chills or nausea or vomiting or drainage from his ear.    REVIEW OF SYSTEMS  See HPI for further details.    PAST MEDICAL HISTORY   has a past medical history of Alcohol abuse, Alcohol abuse, Alcoholic cirrhosis (HCC), Anxiety, Asthma, Depression, ETOH abuse, Gastritis, Hypertension, Liver failure (HCC), Pancreatitis, and Ulcer.    SOCIAL HISTORY  Social History     Tobacco Use   • Smoking status: Former Smoker     Types: Cigarettes   • Smokeless tobacco: Never Used   Substance and Sexual Activity   • Alcohol use: No     Comment: former ETOH, sober x8-9 months   • Drug use: Yes     Types: Inhaled     Comment: marajuana daily   • Sexual activity: Not on file       SURGICAL HISTORY   has a past surgical history that includes appendectomy and appendectomy.    CURRENT MEDICATIONS  Home Medications    **Home medications have not yet been reviewed for this encounter**         ALLERGIES  No Known Allergies    PHYSICAL EXAM  VITAL SIGNS: /68   Pulse 75   Temp 36.1 °C (97 °F) (Temporal)   Resp 16   Ht 1.702 m (5' 7\")   Wt 83.5 kg (184 lb 1.4 oz)   SpO2 95%   BMI 28.83 kg/m²   Pulse ox interpretation: I interpret this pulse ox as normal.  Constitutional: Alert in no apparent " distress.  HENT: Erythematous right tympanic membrane with small central perforation, no drainage.  Normocephalic, Atraumatic, Bilateral external ears normal. Nose normal.   Eyes: Conjunctiva normal, non-icteric.   Heart: Normal peripheral perfusion  Lungs: Unlabored respirations  Skin: Warm, Dry, No erythema, No rash.   Neurologic: Alert, Grossly non-focal.   Psychiatric: Affect normal, Judgment normal, Mood normal, Appears appropriate and not intoxicated.     COURSE & MEDICAL DECISION MAKING  The patient's VS, Nurses notes reviewed. (See chart for details)    6:15 AM Patient seen and examined at bedside.  He has ear pain with an erythematous TM of the perforation, suggestive of otitis media with perforation.  There is no drainage or bleeding.  He will be treated with oral antibiotics, which we discussed.  We also discussed the importance of ENT follow-up, or at least primary care follow-up to ensure spontaneous closure of his tympanic membrane, or to consider repair if necessary, though he understands that this is rarely required.     The patient will return for new or worsening symptoms and is stable at the time of discharge.    The patient is referred to a primary physician for blood pressure management, diabetic screening, and for all other preventative health concerns.      DISPOSITION:  Patient will be discharged home in stable condition.    FOLLOW UP:  Anabelle Goetz M.D.  [address] [facility] [address]  [phone]    Schedule an appointment as soon as possible for a visit   regarding your eardrum    Jered Vogel M.D.  [address] [address]  [phone]    Schedule an appointment as soon as possible for a visit         OUTPATIENT MEDICATIONS:  Discharge Medication List as of 8/6/2019  6:26 AM      START taking these medications    Details   amoxicillin-clavulanate (AUGMENTIN) 875-125 MG Tab Take 1 Tab by mouth 2 times a day for 7 days., Disp-14 Tab, R-0, Print Rx Paper                FINAL IMPRESSION  1. Acute otitis media, unspecified otitis media type    2. Perforation of right tympanic membrane

## 2019-08-06 NOTE — ED NOTES
The patient has been provided with discharge education and information.  The patient was also provided with instructions on follow up care and return precautions.  The patient verbalizes understanding of discharge instructions, follow up care, and return precautions.  All questions have been answered.  Amox RX written by JAIME.  NAD, A/Ox4, good color and appropriate at time of discharge.  Patient ambulated out of department.

## 2019-08-06 NOTE — ED TRIAGE NOTES
"Lao Coelho Medellin  Chief Complaint   Patient presents with   • Ear Pain     Pt complains of ear pain that started yesterday. Pt states that the ear pain and pressure woke him from sleep. Pt states he feels like there is water in his ear and he is having difficulty hearing.      Pt ambulatory to triage with above complaint.     /93   Pulse 83   Temp 36.1 °C (97 °F) (Temporal)   Resp 14   Ht 1.702 m (5' 7\")   Wt 83.5 kg (184 lb 1.4 oz)   SpO2 97%   BMI 28.83 kg/m²     Pt informed of triage process and encouraged to notify staff of any changes or concerns. Pt verbalized understanding of instructions.  Pt placed back in lobby.     "

## 2019-09-05 ENCOUNTER — HOSPITAL ENCOUNTER (EMERGENCY)
Facility: MEDICAL CENTER | Age: 34
End: 2019-09-05
Attending: EMERGENCY MEDICINE

## 2019-09-05 VITALS
HEIGHT: 66 IN | DIASTOLIC BLOOD PRESSURE: 100 MMHG | RESPIRATION RATE: 14 BRPM | SYSTOLIC BLOOD PRESSURE: 145 MMHG | HEART RATE: 75 BPM | BODY MASS INDEX: 29.58 KG/M2 | TEMPERATURE: 97.5 F | WEIGHT: 184.08 LBS | OXYGEN SATURATION: 99 %

## 2019-09-05 DIAGNOSIS — R42 VERTIGO: ICD-10-CM

## 2019-09-05 LAB
ALBUMIN SERPL BCP-MCNC: 4.6 G/DL (ref 3.2–4.9)
ALBUMIN/GLOB SERPL: 1.8 G/DL
ALP SERPL-CCNC: 84 U/L (ref 30–99)
ALT SERPL-CCNC: 18 U/L (ref 2–50)
ANION GAP SERPL CALC-SCNC: 9 MMOL/L (ref 0–11.9)
AST SERPL-CCNC: 17 U/L (ref 12–45)
BASOPHILS # BLD AUTO: 0.6 % (ref 0–1.8)
BASOPHILS # BLD: 0.04 K/UL (ref 0–0.12)
BILIRUB SERPL-MCNC: 0.5 MG/DL (ref 0.1–1.5)
BUN SERPL-MCNC: 9 MG/DL (ref 8–22)
CALCIUM SERPL-MCNC: 9.3 MG/DL (ref 8.5–10.5)
CHLORIDE SERPL-SCNC: 106 MMOL/L (ref 96–112)
CO2 SERPL-SCNC: 23 MMOL/L (ref 20–33)
CREAT SERPL-MCNC: 0.83 MG/DL (ref 0.5–1.4)
EOSINOPHIL # BLD AUTO: 0.12 K/UL (ref 0–0.51)
EOSINOPHIL NFR BLD: 1.7 % (ref 0–6.9)
ERYTHROCYTE [DISTWIDTH] IN BLOOD BY AUTOMATED COUNT: 45.8 FL (ref 35.9–50)
GLOBULIN SER CALC-MCNC: 2.6 G/DL (ref 1.9–3.5)
GLUCOSE SERPL-MCNC: 102 MG/DL (ref 65–99)
HCT VFR BLD AUTO: 46.7 % (ref 42–52)
HGB BLD-MCNC: 15.8 G/DL (ref 14–18)
IMM GRANULOCYTES # BLD AUTO: 0.03 K/UL (ref 0–0.11)
IMM GRANULOCYTES NFR BLD AUTO: 0.4 % (ref 0–0.9)
LIPASE SERPL-CCNC: 52 U/L (ref 11–82)
LYMPHOCYTES # BLD AUTO: 2.55 K/UL (ref 1–4.8)
LYMPHOCYTES NFR BLD: 36 % (ref 22–41)
MCH RBC QN AUTO: 30.7 PG (ref 27–33)
MCHC RBC AUTO-ENTMCNC: 33.8 G/DL (ref 33.7–35.3)
MCV RBC AUTO: 90.9 FL (ref 81.4–97.8)
MONOCYTES # BLD AUTO: 0.5 K/UL (ref 0–0.85)
MONOCYTES NFR BLD AUTO: 7.1 % (ref 0–13.4)
NEUTROPHILS # BLD AUTO: 3.84 K/UL (ref 1.82–7.42)
NEUTROPHILS NFR BLD: 54.2 % (ref 44–72)
NRBC # BLD AUTO: 0 K/UL
NRBC BLD-RTO: 0 /100 WBC
PLATELET # BLD AUTO: 204 K/UL (ref 164–446)
PMV BLD AUTO: 11.9 FL (ref 9–12.9)
POTASSIUM SERPL-SCNC: 4 MMOL/L (ref 3.6–5.5)
PROT SERPL-MCNC: 7.2 G/DL (ref 6–8.2)
RBC # BLD AUTO: 5.14 M/UL (ref 4.7–6.1)
SODIUM SERPL-SCNC: 138 MMOL/L (ref 135–145)
WBC # BLD AUTO: 7.1 K/UL (ref 4.8–10.8)

## 2019-09-05 PROCEDURE — 99284 EMERGENCY DEPT VISIT MOD MDM: CPT

## 2019-09-05 PROCEDURE — 36415 COLL VENOUS BLD VENIPUNCTURE: CPT

## 2019-09-05 PROCEDURE — 80053 COMPREHEN METABOLIC PANEL: CPT

## 2019-09-05 PROCEDURE — 85025 COMPLETE CBC W/AUTO DIFF WBC: CPT

## 2019-09-05 PROCEDURE — 700111 HCHG RX REV CODE 636 W/ 250 OVERRIDE (IP): Performed by: EMERGENCY MEDICINE

## 2019-09-05 PROCEDURE — 83690 ASSAY OF LIPASE: CPT

## 2019-09-05 PROCEDURE — 700102 HCHG RX REV CODE 250 W/ 637 OVERRIDE(OP): Performed by: EMERGENCY MEDICINE

## 2019-09-05 PROCEDURE — A9270 NON-COVERED ITEM OR SERVICE: HCPCS | Performed by: EMERGENCY MEDICINE

## 2019-09-05 RX ORDER — ONDANSETRON 4 MG/1
4 TABLET, ORALLY DISINTEGRATING ORAL ONCE
Status: COMPLETED | OUTPATIENT
Start: 2019-09-05 | End: 2019-09-05

## 2019-09-05 RX ORDER — ONDANSETRON 4 MG/1
4 TABLET, FILM COATED ORAL ONCE
Status: DISCONTINUED | OUTPATIENT
Start: 2019-09-05 | End: 2019-09-05

## 2019-09-05 RX ORDER — ONDANSETRON 4 MG/1
4 TABLET, ORALLY DISINTEGRATING ORAL EVERY 6 HOURS PRN
Qty: 10 TAB | Refills: 0 | Status: ON HOLD | OUTPATIENT
Start: 2019-09-05 | End: 2021-04-09

## 2019-09-05 RX ORDER — MECLIZINE HYDROCHLORIDE 25 MG/1
25 TABLET ORAL 3 TIMES DAILY PRN
Qty: 30 TAB | Refills: 0 | Status: SHIPPED | OUTPATIENT
Start: 2019-09-05 | End: 2022-02-10

## 2019-09-05 RX ORDER — MECLIZINE HYDROCHLORIDE 25 MG/1
25 TABLET ORAL ONCE
Status: COMPLETED | OUTPATIENT
Start: 2019-09-05 | End: 2019-09-05

## 2019-09-05 RX ADMIN — MECLIZINE HYDROCHLORIDE 25 MG: 25 TABLET ORAL at 09:53

## 2019-09-05 RX ADMIN — ONDANSETRON 4 MG: 4 TABLET, ORALLY DISINTEGRATING ORAL at 09:53

## 2019-09-05 ASSESSMENT — LIFESTYLE VARIABLES: DO YOU DRINK ALCOHOL: NO

## 2019-09-05 NOTE — ED TRIAGE NOTES
Pt ambulated to triage with   Chief Complaint   Patient presents with   • Dizziness     pt reports pancretitis flare up; abd pain LUQ    • Nausea     worse than normal   • Ear Pain     reports ruptured ear drum, 3 wks ago, right ear, pt reports that it was checked by PCP and hasn't healed      Pt Informed regarding triage process and verbalized understanding to inform triage tech or RN for any changes in condition. Placed in lobby.

## 2019-09-05 NOTE — ED PROVIDER NOTES
ED Provider Note    Scribed for Beto Timmons D.O. by Marcial Smith. 9/5/2019  9:30 AM    Primary care provider: Jered Vogel M.D.  Means of arrival: walk in  History obtained from: patient  History limited by: none    CHIEF COMPLAINT  Chief Complaint   Patient presents with   • Dizziness     pt reports pancretitis flare up; abd pain LUQ    • Nausea     worse than normal   • Ear Pain     reports ruptured ear drum, 3 wks ago, right ear, pt reports that it was checked by PCP and hasn't healed       HPI  Leo Medellin is a 33 y.o. male who presents to the Emergency Department complaining of intermittent dizziness starting this morning. He describes his dizziness as room spinning that is intermittent and exacerbated when standing and turning his head. Patient reports associated nausea, ear discomfort. He reports a recent history of ruptured TM 3 weeks ago and was evaluated by his primary. Patient reports that since then, he has had intermittent dizziness. This morning his dizziness came on suddenly and was much more sever, prompting him to come to the ED for evaluation. He reports a history of alcoholic pancreatitis. Patient denies vomiting, fever, headache.      REVIEW OF SYSTEMS  Pertinent positives include dizziness, nausea, ear discomfort. Pertinent negatives include no vomiting, fever, headache.  All other systems reviewed and negative.    PAST MEDICAL HISTORY  Past Medical History:   Diagnosis Date   • Alcohol abuse    • Alcohol abuse     5-10 beers   • Alcoholic cirrhosis (HCC)    • Anxiety    • Asthma    • Depression    • ETOH abuse    • Gastritis    • Hypertension    • Liver failure (HCC)    • Pancreatitis    • Ulcer     unsure if abdominal/intestinal       SURGICAL HISTORY  Past Surgical History:   Procedure Laterality Date   • APPENDECTOMY     • PB APPENDECTOMY          SOCIAL HISTORY  Social History     Tobacco Use   • Smoking status: Former Smoker     Types: Cigarettes   •  "Smokeless tobacco: Never Used   Substance Use Topics   • Alcohol use: No     Comment: former ETOH, sober x8-9 months   • Drug use: Yes     Types: Inhaled     Comment: lianne daily      Social History     Substance and Sexual Activity   Drug Use Yes   • Types: Inhaled    Comment: lianne daily       FAMILY HISTORY  History reviewed. No pertinent family history.    CURRENT MEDICATIONS  Home Medications     Reviewed by Sonya Olmos R.N. (Registered Nurse) on 09/05/19 at 0842  Med List Status: Partial   Medication Last Dose Status   albuterol 108 (90 Base) MCG/ACT Aero Soln inhalation aerosol PRN Active   OMEPRAZOLE PO 9/5/2019 Active                ALLERGIES  No Known Allergies    PHYSICAL EXAM  VITAL SIGNS: /78   Pulse (!) 58   Temp 36.4 °C (97.5 °F) (Temporal)   Resp 14   Ht 1.676 m (5' 6\")   Wt 83.5 kg (184 lb 1.4 oz)   SpO2 99%   BMI 29.71 kg/m²     Nursing notes and vitals reviewed.  Constitutional: Well developed, Well nourished, No acute distress, Non-toxic appearance.   HENT: Right TM is dull. No erythema.   Eyes: PERRLA, EOMI, Conjunctiva normal, No discharge. Horizontal nystagmus.   Cardiovascular: Normal heart rate, Normal rhythm, No murmurs, No rubs, No gallops.   Thorax & Lungs: No respiratory distress, No rales, No rhonchi, No wheezing, No chest tenderness.   Abdomen: Bowel sounds normal, Soft, No tenderness, No guarding, No rebound, No masses, No pulsatile masses.   Skin: Warm, Dry, No erythema, No rash.   Musculoskeletal: Intact distal pulses, No edema, No cyanosis, No clubbing. Good range of motion in all major joints. No tenderness to palpation or major deformities noted, no CVA tenderness, no midline back tenderness.   Neurologic:  Alert & oriented to month and age, Normal cognition, Cranial nerves II-XII are intact, No slurred speech, Negative finger to nose bilaterally, No pronator drift bilaterally,   strength 5/5 bilaterally, Leg raise strength 5/5 bilaterally, " Plantarflexion strength 5/5 bilaterally, Dorsiflexion strength 5/5 bilaterally, Deep tendon reflexes 2/4 upper and lower extremities bilaterally, Sensation intact throughout, No Nystagmus. Positive Potsdam-Hallpike test.   Psychiatric: Affect normal for clinical presentation.    DIAGNOSTIC STUDIES/PROCEDURES    LABS  Results for orders placed or performed during the hospital encounter of 09/05/19   CBC WITH DIFFERENTIAL   Result Value Ref Range    WBC 7.1 4.8 - 10.8 K/uL    RBC 5.14 4.70 - 6.10 M/uL    Hemoglobin 15.8 14.0 - 18.0 g/dL    Hematocrit 46.7 42.0 - 52.0 %    MCV 90.9 81.4 - 97.8 fL    MCH 30.7 27.0 - 33.0 pg    MCHC 33.8 33.7 - 35.3 g/dL    RDW 45.8 35.9 - 50.0 fL    Platelet Count 204 164 - 446 K/uL    MPV 11.9 9.0 - 12.9 fL    Neutrophils-Polys 54.20 44.00 - 72.00 %    Lymphocytes 36.00 22.00 - 41.00 %    Monocytes 7.10 0.00 - 13.40 %    Eosinophils 1.70 0.00 - 6.90 %    Basophils 0.60 0.00 - 1.80 %    Immature Granulocytes 0.40 0.00 - 0.90 %    Nucleated RBC 0.00 /100 WBC    Neutrophils (Absolute) 3.84 1.82 - 7.42 K/uL    Lymphs (Absolute) 2.55 1.00 - 4.80 K/uL    Monos (Absolute) 0.50 0.00 - 0.85 K/uL    Eos (Absolute) 0.12 0.00 - 0.51 K/uL    Baso (Absolute) 0.04 0.00 - 0.12 K/uL    Immature Granulocytes (abs) 0.03 0.00 - 0.11 K/uL    NRBC (Absolute) 0.00 K/uL   COMP METABOLIC PANEL   Result Value Ref Range    Sodium 138 135 - 145 mmol/L    Potassium 4.0 3.6 - 5.5 mmol/L    Chloride 106 96 - 112 mmol/L    Co2 23 20 - 33 mmol/L    Anion Gap 9.0 0.0 - 11.9    Glucose 102 (H) 65 - 99 mg/dL    Bun 9 8 - 22 mg/dL    Creatinine 0.83 0.50 - 1.40 mg/dL    Calcium 9.3 8.5 - 10.5 mg/dL    AST(SGOT) 17 12 - 45 U/L    ALT(SGPT) 18 2 - 50 U/L    Alkaline Phosphatase 84 30 - 99 U/L    Total Bilirubin 0.5 0.1 - 1.5 mg/dL    Albumin 4.6 3.2 - 4.9 g/dL    Total Protein 7.2 6.0 - 8.2 g/dL    Globulin 2.6 1.9 - 3.5 g/dL    A-G Ratio 1.8 g/dL   LIPASE   Result Value Ref Range    Lipase 52 11 - 82 U/L   ESTIMATED GFR  "  Result Value Ref Range    GFR If African American >60 >60 mL/min/1.73 m 2    GFR If Non African American >60 >60 mL/min/1.73 m 2   All labs reviewed by me.    COURSE & MEDICAL DECISION MAKING  Pertinent Labs & Imaging studies reviewed. (See chart for details)    9:30 AM - Patient seen and examined at bedside. Ordered CBC with differential, CMP, Lipase to evaluate his symptoms. He will be treated with Zofran 4 mg, Antivert 25 mg.     10:01 AM - Recheck: Patient re-evaluated at beside. Patient's diagnostic results discussed. Discussed patient's condition and treatment plan. Patient will be discharged with instructions and provided with strict return precautions. Patient will be sent home with a prescription for Antivert 25 mg, Zofran 4 mg. Advised to follow up with his primary. Instructed to return to Emergency Department immediately if any new or worsening symptoms.    Discharge vitals: /100   Pulse 75   Temp 36.4 °C (97.5 °F) (Temporal)   Resp 14   Ht 1.676 m (5' 6\")   Wt 83.5 kg (184 lb 1.4 oz)   SpO2 99%   BMI 29.71 kg/m²     This is a charming 33 y.o. male that presents with additional vertigo.  The patient has no evidence of focal neurological vascular deficits.  He recently ruptured his tympanic membrane and had a ear infection due to the patient is expressing vertigo second of this.  Patient received Antivert as well as Zofran here in the emergency department.  He had significant improved following of the ENT for further evaluation and management.  Strict return precautions having GERD.  I do not a suspect central etiology of the patient's condition.  The patient will return for new or worsening symptoms and is stable at the time of discharge.    The patient is referred to a primary physician for blood pressure management, diabetic screening, and for all other preventative health concerns.    DISPOSITION:  Patient will be discharged home in stable condition.    FOLLOW UP:  [facility], Emergency Dept  [address] [facility] [address] [phone]    If symptoms worsen    Anabelle Goetz M.D.  [address] [place] [id number]  [phone]    Schedule an appointment as soon as possible for a visit         OUTPATIENT MEDICATIONS:  Discharge Medication List as of 9/5/2019 10:00 AM      START taking these medications    Details   meclizine (ANTIVERT) 25 MG Tab Take 1 Tab by mouth 3 times a day as needed., Disp-30 Tab, R-0, Normal      ondansetron (ZOFRAN ODT) 4 MG TABLET DISPERSIBLE Take 1 Tab by mouth every 6 hours as needed for Nausea., Disp-10 Tab, R-0, Normal               FINAL IMPRESSION  1. Vertigo Active         Marcial GONZALEZ (Scribe), am scribing for, and in the presence of, Beto Timmons D.O    Electronically signed by: Marcial Smith (Scribe), 9/5/2019    IBeto D.O. personally performed the services described in this documentation, as scribed by Marcial Smith in my presence, and it is both accurate and complete. C    The note accurately reflects work and decisions made by me.  Beto Timmons  9/5/2019  2:41 PM

## 2019-09-05 NOTE — ED NOTES
Education provided to on discharge instructions, follow up instructions, and medication instructions. Pt verbalized understanding. Pt dressed and walked out in stable condition.

## 2019-12-24 ENCOUNTER — HOSPITAL ENCOUNTER (EMERGENCY)
Facility: MEDICAL CENTER | Age: 34
End: 2019-12-24
Attending: EMERGENCY MEDICINE

## 2019-12-24 VITALS
RESPIRATION RATE: 18 BRPM | SYSTOLIC BLOOD PRESSURE: 159 MMHG | TEMPERATURE: 98.4 F | DIASTOLIC BLOOD PRESSURE: 85 MMHG | OXYGEN SATURATION: 100 % | HEART RATE: 89 BPM | HEIGHT: 67 IN | BODY MASS INDEX: 29.76 KG/M2 | WEIGHT: 189.6 LBS

## 2019-12-24 DIAGNOSIS — H92.01 OTALGIA OF RIGHT EAR: ICD-10-CM

## 2019-12-24 PROCEDURE — 99282 EMERGENCY DEPT VISIT SF MDM: CPT

## 2019-12-24 RX ORDER — AMOXICILLIN AND CLAVULANATE POTASSIUM 875; 125 MG/1; MG/1
1 TABLET, FILM COATED ORAL 2 TIMES DAILY
Qty: 20 TAB | Refills: 0 | Status: ON HOLD | OUTPATIENT
Start: 2019-12-24 | End: 2021-04-09

## 2019-12-24 NOTE — ED TRIAGE NOTES
Leo Medellin presents to triage reporting R ear pain and tenderness. Pt also reports associated HA. Denies sore throat, cough, or n/v.    Pt speaking in full sentences, NAD noted. Pt educated of triage process, placed back in waiting area pending room assignment.

## 2019-12-24 NOTE — DISCHARGE INSTRUCTIONS
Please see Dr. Dean in follow-up as you may have a mass or chronic infection behind your eardrum and may require a tympanostomy tube

## 2019-12-24 NOTE — ED PROVIDER NOTES
ED Provider Note    Scribed for Berny Mccain M.D. by Isa Chapa. 12/24/2019  10:08 AM    Primary care provider: CRISTIAN Hamilton  Means of arrival: Private vehicle  History obtained from: Patient  History limited by: None    CHIEF COMPLAINT  Chief Complaint   Patient presents with   • Ear Pain   • Headache       HPI  Leo Medellin is a 33 y.o. male who presents to the Emergency Department for mild right ear pain. He reports his right ear has hurt for about 1 month. He has a history of right ear infections, and his right ear drum ruptured 3-4 months ago. His left ear hurts occasionally, but not often. He has an associated symptoms of mild hearing loss and constant tinnitus, which has worsened recently and become louder. He also notes feeling feverish, but has not checked his temperature. He is afebrile in the ED today. He denies throat pain, otorrhea or dizziness. He is requesting a doctor's note for today.     REVIEW OF SYSTEMS  Pertinent negatives include no throat pain, otorrhea or dizziness.      PAST MEDICAL HISTORY   has a past medical history of Alcohol abuse, Alcohol abuse, Alcoholic cirrhosis (HCC), Anxiety, Asthma, Depression, ETOH abuse, Gastritis, Hypertension, Liver failure (HCC), Pancreatitis, and Ulcer.    SURGICAL HISTORY   has a past surgical history that includes appendectomy and appendectomy.    SOCIAL HISTORY  Social History     Tobacco Use   • Smoking status: Former Smoker     Types: Cigarettes   • Smokeless tobacco: Never Used   Substance Use Topics   • Alcohol use: No     Comment: former ETOH, sober x8-9 months   • Drug use: Yes     Types: Inhaled     Comment: marajuana daily      Social History     Substance and Sexual Activity   Drug Use Yes   • Types: Inhaled    Comment: marajuana daily       FAMILY HISTORY  None noted    CURRENT MEDICATIONS  Home Medications     Reviewed by Schuyler B Collett, R.N. (Registered Nurse) on 12/24/19 at 0947  Med List Status: <None>  "  Medication Last Dose Status   albuterol 108 (90 Base) MCG/ACT Aero Soln inhalation aerosol  Active   meclizine (ANTIVERT) 25 MG Tab  Active   OMEPRAZOLE PO  Active   ondansetron (ZOFRAN ODT) 4 MG TABLET DISPERSIBLE  Active                ALLERGIES  No Known Allergies    PHYSICAL EXAM  VITAL SIGNS: /85   Pulse 89   Temp 36.9 °C (98.4 °F) (Temporal)   Resp 18   Ht 1.702 m (5' 7\") Comment: Simultaneous filing. User may not have seen previous data.  Wt 86 kg (189 lb 9.5 oz)   SpO2 100%   BMI 29.69 kg/m²     Constitutional: Well developed, Well nourished, No acute distress, Non-toxic appearance.   HENT: bulging right TM, loss of light reflex, no perforation or significant erythema. There is a pustular content behind the TM. Throat is mildly erythematous with 2+ tonsils.   Neck: no tenderness  Lymphatic: no adenopathy    COURSE & MEDICAL DECISION MAKING  Nursing notes, VS, PMSFHx reviewed in chart.    10:08 AM Patient seen and examined at bedside. I am concerned the patient has a chronic infection behind his TM, and that tympanostomy tube placement is required.  There may be even be a mass.  He understands that he needs to be fully evaluated by an ENT.      I have prescribed him Augmentin 875-125 mg and instructed him to follow up with Dr. Saravia (ENT) for further evaluation. He understands and agrees to the plan of care.    The patient will return for new or worsening symptoms and is stable at the time of discharge.    The patient is referred to a primary physician for blood pressure management, diabetic screening, and for all other preventative health concerns.    DISPOSITION:  Patient will be discharged home in stable condition.    DISPOSITION:  Patient will be discharged home in stable condition.    FOLLOW UP:  Cameron Saravia M.D.  [address] [facility] [address]  [phone]    Schedule an appointment as soon as possible for a visit         OUTPATIENT MEDICATIONS:  New Prescriptions    " AMOXICILLIN-CLAVULANATE (AUGMENTIN) 875-125 MG TAB    Take 1 Tab by mouth 2 times a day.       FINAL IMPRESSION  1. Otalgia of right ear          Isa GONZALEZ (Scribe), am scribing for, and in the presence of, Berny Mccain M.D..    Electronically signed by: Isa Chapa (Scribe), 12/24/2019    IBerny M.D. personally performed the services described in this documentation, as scribed by Isa Chapa in my presence, and it is both accurate and complete.    The note accurately reflects work and decisions made by me.  Berny Mccain  12/24/2019  10:36 AM

## 2020-01-27 NOTE — DISCHARGE PLANNING
Received call from patient requesting referral to be faxed to Dr. Cameron Saravia so he can be seen. Called Dr. Saravia's office spoke with Tania and she is aware of patient and states she told him to see his PCP first. Told her that his PCP requests he see Dr. Saravia. She told me to fax the ER dictation to her [phone] and she will have Dr. Saravia review it and she will call the patient back. Spoke with patient again on the phone and he is aware of above.

## 2020-06-23 ENCOUNTER — HOSPITAL ENCOUNTER (EMERGENCY)
Facility: MEDICAL CENTER | Age: 35
End: 2020-06-23

## 2020-06-23 VITALS
RESPIRATION RATE: 18 BRPM | SYSTOLIC BLOOD PRESSURE: 145 MMHG | OXYGEN SATURATION: 97 % | BODY MASS INDEX: 30 KG/M2 | HEIGHT: 67 IN | TEMPERATURE: 97.3 F | HEART RATE: 95 BPM | WEIGHT: 191.14 LBS | DIASTOLIC BLOOD PRESSURE: 73 MMHG

## 2020-06-23 PROCEDURE — 302449 STATCHG TRIAGE ONLY (STATISTIC)

## 2020-06-23 ASSESSMENT — FIBROSIS 4 INDEX: FIB4 SCORE: 0.69

## 2020-06-24 NOTE — ED TRIAGE NOTES
Chief Complaint   Patient presents with   • Flank Pain     x 1 hour, left side     Pt ambulatory to triage for above complaint.     Pt is alert/oriented and follows commands. Pt speaking in full sentences and responds appropriately to questions. No acute distress noted in triage and respirations are even and unlabored.     Pt placed in lobby and educated on triage process. Pt encouraged to alert staff for any changes in condition.

## 2021-04-07 ENCOUNTER — APPOINTMENT (OUTPATIENT)
Dept: RADIOLOGY | Facility: MEDICAL CENTER | Age: 36
DRG: 440 | End: 2021-04-07
Attending: EMERGENCY MEDICINE
Payer: MEDICAID

## 2021-04-07 ENCOUNTER — APPOINTMENT (OUTPATIENT)
Dept: CARDIOLOGY | Facility: MEDICAL CENTER | Age: 36
DRG: 440 | End: 2021-04-07
Attending: INTERNAL MEDICINE
Payer: MEDICAID

## 2021-04-07 ENCOUNTER — HOSPITAL ENCOUNTER (INPATIENT)
Facility: MEDICAL CENTER | Age: 36
LOS: 2 days | DRG: 440 | End: 2021-04-09
Attending: EMERGENCY MEDICINE | Admitting: HOSPITALIST
Payer: MEDICAID

## 2021-04-07 DIAGNOSIS — R94.31 ABNORMAL EKG: ICD-10-CM

## 2021-04-07 DIAGNOSIS — K29.20 ACUTE ALCOHOLIC GASTRITIS WITHOUT HEMORRHAGE: ICD-10-CM

## 2021-04-07 DIAGNOSIS — F10.10 ALCOHOL ABUSE: ICD-10-CM

## 2021-04-07 DIAGNOSIS — R73.9 HYPERGLYCEMIA: ICD-10-CM

## 2021-04-07 DIAGNOSIS — K70.30 ALCOHOLIC CIRRHOSIS OF LIVER WITHOUT ASCITES (HCC): ICD-10-CM

## 2021-04-07 DIAGNOSIS — K85.20 ALCOHOL-INDUCED ACUTE PANCREATITIS, UNSPECIFIED COMPLICATION STATUS: ICD-10-CM

## 2021-04-07 DIAGNOSIS — R10.9 ABDOMINAL PAIN, UNSPECIFIED ABDOMINAL LOCATION: ICD-10-CM

## 2021-04-07 DIAGNOSIS — K85.20 ALCOHOL-INDUCED ACUTE PANCREATITIS WITHOUT INFECTION OR NECROSIS: ICD-10-CM

## 2021-04-07 PROBLEM — J45.909 MILD ASTHMA WITHOUT COMPLICATION: Status: ACTIVE | Noted: 2021-04-07

## 2021-04-07 LAB
ALBUMIN SERPL BCP-MCNC: 4 G/DL (ref 3.2–4.9)
ALBUMIN/GLOB SERPL: 1.3 G/DL
ALP SERPL-CCNC: 171 U/L (ref 30–99)
ALT SERPL-CCNC: 91 U/L (ref 2–50)
ANION GAP SERPL CALC-SCNC: 13 MMOL/L (ref 7–16)
APPEARANCE UR: CLEAR
AST SERPL-CCNC: 36 U/L (ref 12–45)
BACTERIA #/AREA URNS HPF: NEGATIVE /HPF
BASOPHILS # BLD AUTO: 0.5 % (ref 0–1.8)
BASOPHILS # BLD: 0.06 K/UL (ref 0–0.12)
BILIRUB SERPL-MCNC: 0.4 MG/DL (ref 0.1–1.5)
BILIRUB UR QL STRIP.AUTO: NEGATIVE
BUN SERPL-MCNC: 8 MG/DL (ref 8–22)
CALCIUM SERPL-MCNC: 8.9 MG/DL (ref 8.5–10.5)
CHLORIDE SERPL-SCNC: 105 MMOL/L (ref 96–112)
CO2 SERPL-SCNC: 21 MMOL/L (ref 20–33)
COLOR UR: ABNORMAL
CREAT SERPL-MCNC: 0.8 MG/DL (ref 0.5–1.4)
EKG IMPRESSION: NORMAL
EOSINOPHIL # BLD AUTO: 0.06 K/UL (ref 0–0.51)
EOSINOPHIL NFR BLD: 0.5 % (ref 0–6.9)
EPI CELLS #/AREA URNS HPF: NEGATIVE /HPF
ERYTHROCYTE [DISTWIDTH] IN BLOOD BY AUTOMATED COUNT: 47.1 FL (ref 35.9–50)
EST. AVERAGE GLUCOSE BLD GHB EST-MCNC: 212 MG/DL
ETHANOL BLD-MCNC: <10.1 MG/DL (ref 0–10)
GLOBULIN SER CALC-MCNC: 3.2 G/DL (ref 1.9–3.5)
GLUCOSE SERPL-MCNC: 215 MG/DL (ref 65–99)
GLUCOSE UR STRIP.AUTO-MCNC: >=1000 MG/DL
HBA1C MFR BLD: 9 % (ref 4–5.6)
HCT VFR BLD AUTO: 47.1 % (ref 42–52)
HGB BLD-MCNC: 15.9 G/DL (ref 14–18)
HYALINE CASTS #/AREA URNS LPF: ABNORMAL /LPF
IMM GRANULOCYTES # BLD AUTO: 0.09 K/UL (ref 0–0.11)
IMM GRANULOCYTES NFR BLD AUTO: 0.8 % (ref 0–0.9)
KETONES UR STRIP.AUTO-MCNC: ABNORMAL MG/DL
LACTATE BLD-SCNC: 2.4 MMOL/L (ref 0.5–2)
LEUKOCYTE ESTERASE UR QL STRIP.AUTO: NEGATIVE
LIPASE SERPL-CCNC: 111 U/L (ref 11–82)
LV EJECT FRACT  99904: 60
LV EJECT FRACT MOD 2C 99903: 64.84
LV EJECT FRACT MOD 4C 99902: 55.18
LV EJECT FRACT MOD BP 99901: 59.83
LYMPHOCYTES # BLD AUTO: 3.4 K/UL (ref 1–4.8)
LYMPHOCYTES NFR BLD: 28.4 % (ref 22–41)
MCH RBC QN AUTO: 30.7 PG (ref 27–33)
MCHC RBC AUTO-ENTMCNC: 33.8 G/DL (ref 33.7–35.3)
MCV RBC AUTO: 90.9 FL (ref 81.4–97.8)
MICRO URNS: ABNORMAL
MONOCYTES # BLD AUTO: 0.8 K/UL (ref 0–0.85)
MONOCYTES NFR BLD AUTO: 6.7 % (ref 0–13.4)
NEUTROPHILS # BLD AUTO: 7.57 K/UL (ref 1.82–7.42)
NEUTROPHILS NFR BLD: 63.1 % (ref 44–72)
NITRITE UR QL STRIP.AUTO: NEGATIVE
NRBC # BLD AUTO: 0 K/UL
NRBC BLD-RTO: 0 /100 WBC
PH UR STRIP.AUTO: 5.5 [PH] (ref 5–8)
PLATELET # BLD AUTO: 254 K/UL (ref 164–446)
PMV BLD AUTO: 12.1 FL (ref 9–12.9)
POTASSIUM SERPL-SCNC: 3.9 MMOL/L (ref 3.6–5.5)
PROT SERPL-MCNC: 7.2 G/DL (ref 6–8.2)
PROT UR QL STRIP: 30 MG/DL
RBC # BLD AUTO: 5.18 M/UL (ref 4.7–6.1)
RBC # URNS HPF: ABNORMAL /HPF
RBC UR QL AUTO: NEGATIVE
SARS-COV-2 RNA RESP QL NAA+PROBE: NOTDETECTED
SODIUM SERPL-SCNC: 139 MMOL/L (ref 135–145)
SP GR UR STRIP.AUTO: 1.03
SPECIMEN SOURCE: NORMAL
TROPONIN T SERPL-MCNC: 7 NG/L (ref 6–19)
UROBILINOGEN UR STRIP.AUTO-MCNC: 1 MG/DL
WBC # BLD AUTO: 12 K/UL (ref 4.8–10.8)
WBC #/AREA URNS HPF: ABNORMAL /HPF

## 2021-04-07 PROCEDURE — 700102 HCHG RX REV CODE 250 W/ 637 OVERRIDE(OP): Performed by: EMERGENCY MEDICINE

## 2021-04-07 PROCEDURE — 96375 TX/PRO/DX INJ NEW DRUG ADDON: CPT

## 2021-04-07 PROCEDURE — 700111 HCHG RX REV CODE 636 W/ 250 OVERRIDE (IP): Performed by: EMERGENCY MEDICINE

## 2021-04-07 PROCEDURE — 96374 THER/PROPH/DIAG INJ IV PUSH: CPT

## 2021-04-07 PROCEDURE — 81001 URINALYSIS AUTO W/SCOPE: CPT

## 2021-04-07 PROCEDURE — 83690 ASSAY OF LIPASE: CPT

## 2021-04-07 PROCEDURE — 700117 HCHG RX CONTRAST REV CODE 255: Performed by: EMERGENCY MEDICINE

## 2021-04-07 PROCEDURE — 770020 HCHG ROOM/CARE - TELE (206)

## 2021-04-07 PROCEDURE — 76705 ECHO EXAM OF ABDOMEN: CPT

## 2021-04-07 PROCEDURE — 71045 X-RAY EXAM CHEST 1 VIEW: CPT

## 2021-04-07 PROCEDURE — 84484 ASSAY OF TROPONIN QUANT: CPT

## 2021-04-07 PROCEDURE — 80053 COMPREHEN METABOLIC PANEL: CPT

## 2021-04-07 PROCEDURE — 99223 1ST HOSP IP/OBS HIGH 75: CPT | Performed by: HOSPITALIST

## 2021-04-07 PROCEDURE — A9270 NON-COVERED ITEM OR SERVICE: HCPCS | Performed by: HOSPITALIST

## 2021-04-07 PROCEDURE — 700105 HCHG RX REV CODE 258: Performed by: HOSPITALIST

## 2021-04-07 PROCEDURE — 93005 ELECTROCARDIOGRAM TRACING: CPT | Performed by: EMERGENCY MEDICINE

## 2021-04-07 PROCEDURE — 83036 HEMOGLOBIN GLYCOSYLATED A1C: CPT

## 2021-04-07 PROCEDURE — 700111 HCHG RX REV CODE 636 W/ 250 OVERRIDE (IP): Performed by: HOSPITALIST

## 2021-04-07 PROCEDURE — U0003 INFECTIOUS AGENT DETECTION BY NUCLEIC ACID (DNA OR RNA); SEVERE ACUTE RESPIRATORY SYNDROME CORONAVIRUS 2 (SARS-COV-2) (CORONAVIRUS DISEASE [COVID-19]), AMPLIFIED PROBE TECHNIQUE, MAKING USE OF HIGH THROUGHPUT TECHNOLOGIES AS DESCRIBED BY CMS-2020-01-R: HCPCS

## 2021-04-07 PROCEDURE — 700105 HCHG RX REV CODE 258: Performed by: EMERGENCY MEDICINE

## 2021-04-07 PROCEDURE — 74177 CT ABD & PELVIS W/CONTRAST: CPT

## 2021-04-07 PROCEDURE — A9270 NON-COVERED ITEM OR SERVICE: HCPCS | Performed by: EMERGENCY MEDICINE

## 2021-04-07 PROCEDURE — 93306 TTE W/DOPPLER COMPLETE: CPT | Mod: 26 | Performed by: INTERNAL MEDICINE

## 2021-04-07 PROCEDURE — 82962 GLUCOSE BLOOD TEST: CPT

## 2021-04-07 PROCEDURE — 82077 ASSAY SPEC XCP UR&BREATH IA: CPT

## 2021-04-07 PROCEDURE — 93306 TTE W/DOPPLER COMPLETE: CPT

## 2021-04-07 PROCEDURE — 700102 HCHG RX REV CODE 250 W/ 637 OVERRIDE(OP): Performed by: HOSPITALIST

## 2021-04-07 PROCEDURE — 99285 EMERGENCY DEPT VISIT HI MDM: CPT

## 2021-04-07 PROCEDURE — 85025 COMPLETE CBC W/AUTO DIFF WBC: CPT

## 2021-04-07 PROCEDURE — 87040 BLOOD CULTURE FOR BACTERIA: CPT | Mod: 91

## 2021-04-07 PROCEDURE — HZ2ZZZZ DETOXIFICATION SERVICES FOR SUBSTANCE ABUSE TREATMENT: ICD-10-PCS | Performed by: HOSPITALIST

## 2021-04-07 PROCEDURE — 83605 ASSAY OF LACTIC ACID: CPT

## 2021-04-07 PROCEDURE — 99254 IP/OBS CNSLTJ NEW/EST MOD 60: CPT | Performed by: INTERNAL MEDICINE

## 2021-04-07 PROCEDURE — U0005 INFEC AGEN DETEC AMPLI PROBE: HCPCS

## 2021-04-07 RX ORDER — LISINOPRIL 20 MG/1
20 TABLET ORAL DAILY
Status: DISCONTINUED | OUTPATIENT
Start: 2021-04-08 | End: 2021-04-09

## 2021-04-07 RX ORDER — TRAZODONE HYDROCHLORIDE 50 MG/1
50-100 TABLET ORAL
Status: ON HOLD | COMMUNITY
End: 2022-02-11

## 2021-04-07 RX ORDER — AMOXICILLIN 250 MG
2 CAPSULE ORAL 2 TIMES DAILY
Status: DISCONTINUED | OUTPATIENT
Start: 2021-04-07 | End: 2021-04-09 | Stop reason: HOSPADM

## 2021-04-07 RX ORDER — LISINOPRIL 20 MG/1
20 TABLET ORAL ONCE
Status: COMPLETED | OUTPATIENT
Start: 2021-04-07 | End: 2021-04-07

## 2021-04-07 RX ORDER — ASPIRIN 81 MG/1
324 TABLET, CHEWABLE ORAL ONCE
Status: COMPLETED | OUTPATIENT
Start: 2021-04-07 | End: 2021-04-07

## 2021-04-07 RX ORDER — DEXTROSE MONOHYDRATE 25 G/50ML
50 INJECTION, SOLUTION INTRAVENOUS
Status: DISCONTINUED | OUTPATIENT
Start: 2021-04-07 | End: 2021-04-09 | Stop reason: HOSPADM

## 2021-04-07 RX ORDER — ONDANSETRON 2 MG/ML
4 INJECTION INTRAMUSCULAR; INTRAVENOUS ONCE
Status: COMPLETED | OUTPATIENT
Start: 2021-04-07 | End: 2021-04-07

## 2021-04-07 RX ORDER — OMEPRAZOLE 20 MG/1
20 CAPSULE, DELAYED RELEASE ORAL 2 TIMES DAILY
Status: DISCONTINUED | OUTPATIENT
Start: 2021-04-07 | End: 2021-04-09 | Stop reason: HOSPADM

## 2021-04-07 RX ORDER — SODIUM CHLORIDE 9 MG/ML
INJECTION, SOLUTION INTRAVENOUS CONTINUOUS
Status: DISCONTINUED | OUTPATIENT
Start: 2021-04-07 | End: 2021-04-09

## 2021-04-07 RX ORDER — OXYCODONE HYDROCHLORIDE 10 MG/1
10 TABLET ORAL
Status: DISCONTINUED | OUTPATIENT
Start: 2021-04-07 | End: 2021-04-09 | Stop reason: HOSPADM

## 2021-04-07 RX ORDER — MORPHINE SULFATE 4 MG/ML
4 INJECTION, SOLUTION INTRAMUSCULAR; INTRAVENOUS ONCE
Status: COMPLETED | OUTPATIENT
Start: 2021-04-07 | End: 2021-04-07

## 2021-04-07 RX ORDER — MORPHINE SULFATE 4 MG/ML
4 INJECTION, SOLUTION INTRAMUSCULAR; INTRAVENOUS
Status: DISCONTINUED | OUTPATIENT
Start: 2021-04-07 | End: 2021-04-09 | Stop reason: HOSPADM

## 2021-04-07 RX ORDER — GAUZE BANDAGE 2" X 2"
100 BANDAGE TOPICAL EVERY EVENING
Status: DISCONTINUED | OUTPATIENT
Start: 2021-04-07 | End: 2021-04-09 | Stop reason: HOSPADM

## 2021-04-07 RX ORDER — OXYCODONE HYDROCHLORIDE 5 MG/1
5 TABLET ORAL
Status: DISCONTINUED | OUTPATIENT
Start: 2021-04-07 | End: 2021-04-09 | Stop reason: HOSPADM

## 2021-04-07 RX ORDER — DULOXETIN HYDROCHLORIDE 60 MG/1
60 CAPSULE, DELAYED RELEASE ORAL
COMMUNITY

## 2021-04-07 RX ORDER — POLYETHYLENE GLYCOL 3350 17 G/17G
1 POWDER, FOR SOLUTION ORAL
Status: DISCONTINUED | OUTPATIENT
Start: 2021-04-07 | End: 2021-04-09 | Stop reason: HOSPADM

## 2021-04-07 RX ORDER — TRAZODONE HYDROCHLORIDE 50 MG/1
50 TABLET ORAL
Status: DISCONTINUED | OUTPATIENT
Start: 2021-04-07 | End: 2021-04-09 | Stop reason: HOSPADM

## 2021-04-07 RX ORDER — ENALAPRILAT 1.25 MG/ML
1.25 INJECTION INTRAVENOUS EVERY 6 HOURS PRN
Status: DISCONTINUED | OUTPATIENT
Start: 2021-04-07 | End: 2021-04-09 | Stop reason: HOSPADM

## 2021-04-07 RX ORDER — BISACODYL 10 MG
10 SUPPOSITORY, RECTAL RECTAL
Status: DISCONTINUED | OUTPATIENT
Start: 2021-04-07 | End: 2021-04-09 | Stop reason: HOSPADM

## 2021-04-07 RX ORDER — SODIUM CHLORIDE, SODIUM LACTATE, POTASSIUM CHLORIDE, CALCIUM CHLORIDE 600; 310; 30; 20 MG/100ML; MG/100ML; MG/100ML; MG/100ML
1000 INJECTION, SOLUTION INTRAVENOUS ONCE
Status: COMPLETED | OUTPATIENT
Start: 2021-04-07 | End: 2021-04-07

## 2021-04-07 RX ORDER — FOLIC ACID 1 MG/1
1 TABLET ORAL ONCE
Status: COMPLETED | OUTPATIENT
Start: 2021-04-07 | End: 2021-04-07

## 2021-04-07 RX ADMIN — ASPIRIN 324 MG: 81 TABLET, CHEWABLE ORAL at 15:55

## 2021-04-07 RX ADMIN — LISINOPRIL 20 MG: 20 TABLET ORAL at 18:46

## 2021-04-07 RX ADMIN — SODIUM CHLORIDE: 9 INJECTION, SOLUTION INTRAVENOUS at 20:54

## 2021-04-07 RX ADMIN — THERA TABS 1 TABLET: TAB at 20:53

## 2021-04-07 RX ADMIN — OMEPRAZOLE 20 MG: 20 CAPSULE, DELAYED RELEASE ORAL at 18:43

## 2021-04-07 RX ADMIN — IOHEXOL 100 ML: 350 INJECTION, SOLUTION INTRAVENOUS at 16:55

## 2021-04-07 RX ADMIN — Medication 100 MG: at 18:43

## 2021-04-07 RX ADMIN — MORPHINE SULFATE 4 MG: 4 INJECTION INTRAVENOUS at 15:25

## 2021-04-07 RX ADMIN — TRAZODONE HYDROCHLORIDE 50 MG: 50 TABLET ORAL at 20:53

## 2021-04-07 RX ADMIN — FOLIC ACID 1 MG: 1 TABLET ORAL at 20:42

## 2021-04-07 RX ADMIN — ENALAPRILAT 1.25 MG: 1.25 INJECTION INTRAVENOUS at 20:43

## 2021-04-07 RX ADMIN — METOPROLOL TARTRATE 25 MG: 25 TABLET, FILM COATED ORAL at 18:43

## 2021-04-07 RX ADMIN — ONDANSETRON 4 MG: 2 INJECTION INTRAMUSCULAR; INTRAVENOUS at 15:25

## 2021-04-07 RX ADMIN — SODIUM CHLORIDE, POTASSIUM CHLORIDE, SODIUM LACTATE AND CALCIUM CHLORIDE 1000 ML: 600; 310; 30; 20 INJECTION, SOLUTION INTRAVENOUS at 15:34

## 2021-04-07 ASSESSMENT — LIFESTYLE VARIABLES
TOTAL SCORE: 4
DOES PATIENT WANT TO TALK TO SOMEONE ABOUT QUITTING: NO
CONSUMPTION TOTAL: POSITIVE
ON A TYPICAL DAY WHEN YOU DRINK ALCOHOL HOW MANY DRINKS DO YOU HAVE: 8
AVERAGE NUMBER OF DAYS PER WEEK YOU HAVE A DRINK CONTAINING ALCOHOL: 2
EVER HAD A DRINK FIRST THING IN THE MORNING TO STEADY YOUR NERVES TO GET RID OF A HANGOVER: YES
DOES PATIENT WANT TO STOP DRINKING: YES
TOTAL SCORE: 4
HAVE YOU EVER FELT YOU SHOULD CUT DOWN ON YOUR DRINKING: YES
HOW MANY TIMES IN THE PAST YEAR HAVE YOU HAD 5 OR MORE DRINKS IN A DAY: 6
SUBSTANCE_ABUSE: 0
TOTAL SCORE: 4
ALCOHOL_USE: YES
HAVE PEOPLE ANNOYED YOU BY CRITICIZING YOUR DRINKING: YES
EVER FELT BAD OR GUILTY ABOUT YOUR DRINKING: YES

## 2021-04-07 ASSESSMENT — COGNITIVE AND FUNCTIONAL STATUS - GENERAL
SUGGESTED CMS G CODE MODIFIER MOBILITY: CH
MOBILITY SCORE: 24
SUGGESTED CMS G CODE MODIFIER DAILY ACTIVITY: CH
DAILY ACTIVITIY SCORE: 24

## 2021-04-07 ASSESSMENT — ENCOUNTER SYMPTOMS
NECK PAIN: 0
ABDOMINAL PAIN: 1
ORTHOPNEA: 0
EYE PAIN: 0
HALLUCINATIONS: 0
DIARRHEA: 1
NAUSEA: 1
BLURRED VISION: 0
DIZZINESS: 0
TINGLING: 0
SPUTUM PRODUCTION: 0
DEPRESSION: 0
PHOTOPHOBIA: 0
CONSTIPATION: 0
COUGH: 0
HEADACHES: 0
PALPITATIONS: 0
MYALGIAS: 0
SPEECH CHANGE: 0
HEMOPTYSIS: 0
BLOOD IN STOOL: 0
CLAUDICATION: 0
EYE DISCHARGE: 0
TREMORS: 0

## 2021-04-07 ASSESSMENT — FIBROSIS 4 INDEX
FIB4 SCORE: .7071067811865475243
FIB4 SCORE: 0.53
FIB4 SCORE: 0.53

## 2021-04-07 ASSESSMENT — PAIN DESCRIPTION - PAIN TYPE
TYPE: ACUTE PAIN
TYPE: ACUTE PAIN

## 2021-04-07 ASSESSMENT — PATIENT HEALTH QUESTIONNAIRE - PHQ9
2. FEELING DOWN, DEPRESSED, IRRITABLE, OR HOPELESS: NOT AT ALL
1. LITTLE INTEREST OR PLEASURE IN DOING THINGS: NOT AT ALL
SUM OF ALL RESPONSES TO PHQ9 QUESTIONS 1 AND 2: 0

## 2021-04-07 NOTE — ED TRIAGE NOTES
"Chief Complaint   Patient presents with   • Abdominal Pain     RLQ pain started around 1345 today and was described as \"wrost pain of my life.\" The patient has a history of gastritis and pancreatitis and appendectomy. No painful urination. Normal bowel movements.     Patient BIBA for above complaint. Patient states he had 8 beers yesterday after being sober for 2.5 years. Patient states he has been drinking on and off on the weekends.    Pain started at 10/10 when EMS was called, now is 2/10. Abdomen is distended and more firm to the touch.    Vitals:    04/07/21 1414   BP: 150/100   Pulse: (!) 106   Resp: 17   Temp: 36.8 °C (98.3 °F)   SpO2: 98%     "

## 2021-04-07 NOTE — CONSULTS
Consults   Cardiology Initial Consultation    Date of Service  4/7/2021    Referring Physician  Lulu Calero M.D.    Reason for Consultation  Possible STEMI    History of Presenting Illness  Wero Medellin is a 35 y.o. male admitted 4/7/2021 with abdominal pain, comes and goes, mid abd, worse than his chronic pain, was the worst so far, went away, now gone. Thought it might be kidney stones.    ECG is abnormal, concern for possible ST elevation MI.  ECG shows LVH with strain pattern with some changes of T wave but does not meet criteria for STEMI.    Has had HTN, used to take meds, then it seemed controlled so he stopped meds.    He is not limited by chest pain, pressure or tightness with activity.   No significant dyspnea on exertion, orthopnea or lower extremity swelling.   No significant palpitations, lightheadedness, or presyncope/syncope.   No symptoms of leg claudication.   No stroke/TIA like symptoms.    Coughing does make him feel like he might pass out.  Smokes cannabis and it makes him cough, then gets back pain.  Tells me he is acholic, started drinking again, wants to quit.  Some caffeine daily.    Probably pre-DM    No prior hyperlipidemia.  No family history of premature coronary artery disease.  Former cigarette smoker.  No history of autoimmune disease such as lupus or rheumatoid arthritis.  No chronic kidney disease.  No hx asthma.      Brother with DM.  Mom with HTN.    Supportive girlfriend.      Review of Systems  Review of Systems    All systems reviewed and negative.    Past Medical History   has a past medical history of Alcohol abuse, Alcohol abuse, Alcoholic cirrhosis (HCC), Anxiety, Asthma, Depression, ETOH abuse, Gastritis, Hypertension, Liver failure (HCC), Pancreatitis, and Ulcer.    Surgical History   has a past surgical history that includes pr appendectomy and appendectomy.    Family History  family history includes Diabetes in his brother.      Social History   reports that he  has quit smoking. His smoking use included cigarettes. He has never used smokeless tobacco. He reports current alcohol use. He reports current drug use. Drug: Inhaled.    Medications  Prior to Admission Medications   Prescriptions Last Dose Informant Patient Reported? Taking?   DULoxetine (CYMBALTA) 60 MG Cap DR Particles delayed-release capsule   Yes Yes   Sig: Take 60 mg by mouth every day. Indications: Major Depressive Disorder   OMEPRAZOLE PO   Yes No   Sig: Take  by mouth.   albuterol 108 (90 Base) MCG/ACT Aero Soln inhalation aerosol  Patient No No   Sig: Inhale 2 Puffs by mouth every 6 hours as needed for Shortness of Breath.   amoxicillin-clavulanate (AUGMENTIN) 875-125 MG Tab   No No   Sig: Take 1 Tab by mouth 2 times a day.   meclizine (ANTIVERT) 25 MG Tab   No No   Sig: Take 1 Tab by mouth 3 times a day as needed.   ondansetron (ZOFRAN ODT) 4 MG TABLET DISPERSIBLE   No No   Sig: Take 1 Tab by mouth every 6 hours as needed for Nausea.   traZODone (DESYREL) 50 MG Tab   Yes Yes   Sig: Take 50 mg by mouth every evening. Indications: Trouble Sleeping      Facility-Administered Medications: None       Allergies  No Known Allergies    Vital signs in last 24 hours  Temp:  [36.8 °C (98.3 °F)] 36.8 °C (98.3 °F)  Pulse:  [106-111] 111  Resp:  [17-21] 21  BP: (150-164)/() 164/76  SpO2:  [96 %-98 %] 96 %    Physical Exam  Physical Exam      General: No acute distress. Well nourished.  HEENT: EOM grossly intact, no scleral icterus, no pharyngeal erythema.   Neck:  No JVD, no bruits, trachea midline  CVS: RRR. Normal S1, S2. No M/R/G. No LE edema.  2+ radial pulses, 2+ PT pulses  Resp: CTAB. No wheezing or crackles/rhonchi. Normal respiratory effort.  Abdomen: Soft, NT, no randall hepatomegaly.  MSK/Ext: No clubbing or cyanosis.  Skin: Warm and dry, no rashes.  Neurological: CN III-XII grossly intact. No focal deficits.   Psych: A&O x 3, appropriate affect, good judgement      Lab Review  Lab Results   Component  Value Date/Time    WBC 12.0 (H) 04/07/2021 02:15 PM    RBC 5.18 04/07/2021 02:15 PM    HEMOGLOBIN 15.9 04/07/2021 02:15 PM    HEMATOCRIT 47.1 04/07/2021 02:15 PM    MCV 90.9 04/07/2021 02:15 PM    MCH 30.7 04/07/2021 02:15 PM    MCHC 33.8 04/07/2021 02:15 PM    MPV 12.1 04/07/2021 02:15 PM      Lab Results   Component Value Date/Time    SODIUM 139 04/07/2021 02:15 PM    POTASSIUM 3.9 04/07/2021 02:15 PM    CHLORIDE 105 04/07/2021 02:15 PM    CO2 21 04/07/2021 02:15 PM    GLUCOSE 215 (H) 04/07/2021 02:15 PM    BUN 8 04/07/2021 02:15 PM    CREATININE 0.80 04/07/2021 02:15 PM    CREATININE 1.0 04/19/2006 10:30 AM      Lab Results   Component Value Date/Time    ASTSGOT 36 04/07/2021 02:15 PM    ALTSGPT 91 (H) 04/07/2021 02:15 PM     No results found for: CHOLSTRLTOT, LDL, HDL, TRIGLYCERIDE, TROPONINT    No results for input(s): NTPROBNP in the last 72 hours.    Cardiac Imaging and Procedures Review  EKG:  My personal interpretation of the EKG dated 4-7-21 is sinus, LVH with strain    Echocardiogram:  pending    Stress Test:  NA    Cardiac Catheterization:  NA    Imaging  CT-ABDOMEN-PELVIS WITH   Final Result      1.  No acute abnormality in the abdomen or pelvis.   2.  Hepatic steatosis.      DX-CHEST-LIMITED (1 VIEW)   Final Result         No acute cardiopulmonary abnormalities are identified.      US-RUQ   Final Result      1.  Decreased pancreatic echogenicity, which may be due to pancreatitis.   2.  No gallstones.   3.  Increased hepatic echogenicity, suggestive of steatosis and/or hepatocellular disease.         EC-ECHOCARDIOGRAM COMPLETE W/O CONT    (Results Pending)         Assessment/Plan  -Atypical CP at times, related to coughing  -Abd pain, resolved  -Abnormal ECG, c/w LVH  -Uncontrolled HTN  -Cannabis use  -Former cigarette smoker  -Alcohol abuse, wants to quit  -Caffeine use  -Pre DM vs DM  -Fatty liver, pt aware    Plan:  -Not ACS  -Needs BP control, headed toward irreversible diastolic CHF and he is  young, he would like to work on BP control  -Goal BP needs to be under 130/80 consistently.  -Needs to see [facility] PCP for medications  -Probably best to send out on losartan 100 mg and carvedilol 6.25 mg AM and PM  -Echo before discharge for LVH would be nice, but not mandatory    Discussed with Dr. Calero      Cardiology will sign off, please recall with questions or concerns.    Thank you for allowing me to participate in the care of this patient.    Please contact me with any questions.    Lolita Fernandez M.D.   Cardiologist, [facility]  [phone]

## 2021-04-07 NOTE — ED PROVIDER NOTES
"ED Provider Note  CHIEF COMPLAINT  Chief Complaint   Patient presents with   • Abdominal Pain     RLQ pain started around 1345 today and was described as \"wrost pain of my life.\" The patient has a history of gastritis and pancreatitis and appendectomy. No painful urination. Normal bowel movements.       HPI  Leo Medellin is a 35 y.o. male who presents to the ER complaining of right-sided abdominal pain which began a little over an hour ago.  Patient states the pain was sudden in onset.  It started in the right side of his abdomen.  He now has \"cramps as though he has been running\" in both sides of his abdomen.  He says his abdomen feels distended.  He feels a little bit of discomfort into his back, mostly on the right.  His appendix is already gone.  No other abdominal surgeries.  Last night around 4 in the morning he had an episode of vomiting.  Yesterday he drank 8 or 9 beers.  He says typically he drinks alcohol on the weekends.  He was sober for nearly 2 years but is started drinking alcohol again.  He says he always has loose stools or diarrhea.  Nothing new or different in that respect.  His last bowel movement was within the last 24 hours.  It was normal for him.  No fevers or chills.  No cough, runny nose or sore throat.  No pain with urination.  He says over the last several months as he is been drinking alcohol again he has been urinating more frequently.  He had a similar episode of pain  a few months ago which lasted a few hours and then resolved.  He said he sought medical attention at that time and they thought perhaps he had a kidney stone although there was no kidney stone seen on CT scan per patient.    REVIEW OF SYSTEMS  See HPI for further details. All other systems are negative.    PAST MEDICAL HISTORY  Past Medical History:   Diagnosis Date   • Alcohol abuse    • Alcohol abuse     5-10 beers   • Alcoholic cirrhosis (HCC)    • Anxiety    • Asthma    • Depression    • ETOH abuse    • " Gastritis    • Hypertension    • Liver failure (HCC)    • Pancreatitis    • Ulcer     unsure if abdominal/intestinal       FAMILY HISTORY  Family History   Problem Relation Age of Onset   • Diabetes Brother    • Hypertension Mother        SOCIAL HISTORY  Social History     Socioeconomic History   • Marital status: Single     Spouse name: Not on file   • Number of children: Not on file   • Years of education: Not on file   • Highest education level: Not on file   Occupational History   • Not on file   Tobacco Use   • Smoking status: Former Smoker     Types: Cigarettes   • Smokeless tobacco: Never Used   Substance and Sexual Activity   • Alcohol use: Yes     Comment: 8 beers yesterday 4/6/2021   • Drug use: Yes     Types: Inhaled     Comment: marajuana daily   • Sexual activity: Not on file   Other Topics Concern   • Not on file   Social History Narrative    ** Merged History Encounter **         ** Merged History Encounter **          Social Determinants of Health     Financial Resource Strain:    • Difficulty of Paying Living Expenses:    Food Insecurity:    • Worried About Running Out of Food in the Last Year:    • Ran Out of Food in the Last Year:    Transportation Needs:    • Lack of Transportation (Medical):    • Lack of Transportation (Non-Medical):    Physical Activity:    • Days of Exercise per Week:    • Minutes of Exercise per Session:    Stress:    • Feeling of Stress :    Social Connections:    • Frequency of Communication with Friends and Family:    • Frequency of Social Gatherings with Friends and Family:    • Attends Synagogue Services:    • Active Member of Clubs or Organizations:    • Attends Club or Organization Meetings:    • Marital Status:    Intimate Partner Violence:    • Fear of Current or Ex-Partner:    • Emotionally Abused:    • Physically Abused:    • Sexually Abused:        SURGICAL HISTORY  Past Surgical History:   Procedure Laterality Date   • APPENDECTOMY     • PB APPENDECTOMY    "      CURRENT MEDICATIONS  Home Medications     Reviewed by Nguyễn Barron (Pharmacy Tech) on 04/07/21 at 1638  Med List Status: Complete   Medication Last Dose Status   albuterol 108 (90 Base) MCG/ACT Aero Soln inhalation aerosol NOT TAKING Active   amoxicillin-clavulanate (AUGMENTIN) 875-125 MG Tab NOT TAKING Active   DULoxetine (CYMBALTA) 60 MG Cap DR Particles delayed-release capsule 4/6/2021 Active   meclizine (ANTIVERT) 25 MG Tab NOT TAKING Active   ondansetron (ZOFRAN ODT) 4 MG TABLET DISPERSIBLE NOT TAKING Active   traZODone (DESYREL) 50 MG Tab 4/6/2021 Active                ALLERGIES  No Known Allergies    PHYSICAL EXAM  VITAL SIGNS: /96   Pulse 90   Temp 36.6 °C (97.9 °F) (Temporal)   Resp 20   Ht 1.702 m (5' 7\")   Wt 93.3 kg (205 lb 11 oz)   SpO2 96%   BMI 32.22 kg/m²      Constitutional: Well developed, well nourished; No acute distress; Non-toxic appearance.   HENT: Normocephalic, atraumatic; Bilateral external ears normal; Oropharynx with dry mucous membranes  Eyes: PERRL, EOMI, Conjunctiva normal. No discharge.   Neck:  Supple, nontender midline; No stridor; No nuchal rigidity.   Lymphatic: No cervical lymphadenopathy noted.   Cardiovascular: Tachycardic regular rate and rhythm without murmurs, rubs, or gallop.   Thorax & Lungs: No respiratory distress, breath sounds clear to auscultation bilaterally without wheezing, rales or rhonchi. Nontender chest wall. No crepitus or subcutaneous air  Abdomen: Soft, there is pain in the right mid abdomen when I push in the left abdomen.  Slightly distended.  Bowel sounds decreased throughout.  Tender diffusely to percussion.  Tender to palpation in the midepigastrium.  Question hepatomegaly,  No obvious masses; No pulsatile masses; no rebound, guarding, or peritoneal signs.   Skin: Good color; warm and dry without rash or petechia.  Back: Nontender, No CVA tenderness.   Extremities: Distal radial, dorsalis pedis, posterior tibial pulses are equal " bilaterally; No edema; Nontender calves or saphenous, No cyanosis, No clubbing.   Musculoskeletal: Good range of motion in all major joints. No tenderness to palpation or major deformities noted.   Neurologic: Alert & oriented x 4, clear speech    EKG  Please see my EKG  interpretation below    RADIOLOGY/PROCEDURES  EC-ECHOCARDIOGRAM COMPLETE W/O CONT   Final Result      CT-ABDOMEN-PELVIS WITH   Final Result      1.  No acute abnormality in the abdomen or pelvis.   2.  Hepatic steatosis.      DX-CHEST-LIMITED (1 VIEW)   Final Result         No acute cardiopulmonary abnormalities are identified.      US-RUQ   Final Result      1.  Decreased pancreatic echogenicity, which may be due to pancreatitis.   2.  No gallstones.   3.  Increased hepatic echogenicity, suggestive of steatosis and/or hepatocellular disease.             COURSE & MEDICAL DECISION MAKING  Pertinent Labs & Imaging studies reviewed. (See chart for details)    Results for orders placed or performed during the hospital encounter of 04/07/21   EC-ECHOCARDIOGRAM COMPLETE W/O CONT   Result Value Ref Range    Eject.Frac. MOD BP 59.83     Eject.Frac. MOD 4C 55.18     Eject.Frac. MOD 2C 64.84     Left Ventrical Ejection Fraction 60    CBC WITH DIFFERENTIAL   Result Value Ref Range    WBC 12.0 (H) 4.8 - 10.8 K/uL    RBC 5.18 4.70 - 6.10 M/uL    Hemoglobin 15.9 14.0 - 18.0 g/dL    Hematocrit 47.1 42.0 - 52.0 %    MCV 90.9 81.4 - 97.8 fL    MCH 30.7 27.0 - 33.0 pg    MCHC 33.8 33.7 - 35.3 g/dL    RDW 47.1 35.9 - 50.0 fL    Platelet Count 254 164 - 446 K/uL    MPV 12.1 9.0 - 12.9 fL    Neutrophils-Polys 63.10 44.00 - 72.00 %    Lymphocytes 28.40 22.00 - 41.00 %    Monocytes 6.70 0.00 - 13.40 %    Eosinophils 0.50 0.00 - 6.90 %    Basophils 0.50 0.00 - 1.80 %    Immature Granulocytes 0.80 0.00 - 0.90 %    Nucleated RBC 0.00 /100 WBC    Neutrophils (Absolute) 7.57 (H) 1.82 - 7.42 K/uL    Lymphs (Absolute) 3.40 1.00 - 4.80 K/uL    Monos (Absolute) 0.80 0.00 - 0.85  K/uL    Eos (Absolute) 0.06 0.00 - 0.51 K/uL    Baso (Absolute) 0.06 0.00 - 0.12 K/uL    Immature Granulocytes (abs) 0.09 0.00 - 0.11 K/uL    NRBC (Absolute) 0.00 K/uL   COMP METABOLIC PANEL   Result Value Ref Range    Sodium 139 135 - 145 mmol/L    Potassium 3.9 3.6 - 5.5 mmol/L    Chloride 105 96 - 112 mmol/L    Co2 21 20 - 33 mmol/L    Anion Gap 13.0 7.0 - 16.0    Glucose 215 (H) 65 - 99 mg/dL    Bun 8 8 - 22 mg/dL    Creatinine 0.80 0.50 - 1.40 mg/dL    Calcium 8.9 8.5 - 10.5 mg/dL    AST(SGOT) 36 12 - 45 U/L    ALT(SGPT) 91 (H) 2 - 50 U/L    Alkaline Phosphatase 171 (H) 30 - 99 U/L    Total Bilirubin 0.4 0.1 - 1.5 mg/dL    Albumin 4.0 3.2 - 4.9 g/dL    Total Protein 7.2 6.0 - 8.2 g/dL    Globulin 3.2 1.9 - 3.5 g/dL    A-G Ratio 1.3 g/dL   LIPASE   Result Value Ref Range    Lipase 111 (H) 11 - 82 U/L   URINALYSIS    Specimen: Urine, Clean Catch   Result Value Ref Range    Color DK Yellow     Character Clear     Specific Gravity 1.034 <1.035    Ph 5.5 5.0 - 8.0    Glucose >=1000 (A) Negative mg/dL    Ketones Trace (A) Negative mg/dL    Protein 30 (A) Negative mg/dL    Bilirubin Negative Negative    Urobilinogen, Urine 1.0 Negative    Nitrite Negative Negative    Leukocyte Esterase Negative Negative    Occult Blood Negative Negative    Micro Urine Req Microscopic    ESTIMATED GFR   Result Value Ref Range    GFR If African American >60 >60 mL/min/1.73 m 2    GFR If Non African American >60 >60 mL/min/1.73 m 2   TROPONIN   Result Value Ref Range    Troponin T 7 6 - 19 ng/L   LACTIC ACID   Result Value Ref Range    Lactic Acid 2.4 (H) 0.5 - 2.0 mmol/L   DIAGNOSTIC ALCOHOL   Result Value Ref Range    Diagnostic Alcohol <10.1 0.0 - 10.0 mg/dL   SARS-CoV-2 PCR (24 hour In-House): Collect [facility] swab in [facility]    Specimen: Nasopharyngeal; Respirate   Result Value Ref Range    SARS-CoV-2 Source NP Swab     SARS-CoV-2 by PCR NotDetected    URINE MICROSCOPIC (W/UA)   Result Value Ref Range    WBC 0-2 (A) /hpf    RBC 0-2 (A) /hpf     Bacteria Negative None /hpf    Epithelial Cells Negative /hpf    Hyaline Cast 0-2 /lpf   HEMOGLOBIN A1C   Result Value Ref Range    Glycohemoglobin 9.0 (H) 4.0 - 5.6 %    Est Avg Glucose 212 mg/dL   EKG (NOW)   Result Value Ref Range    Report       [facility]    Test Date:  2021  Pt Name:    JACKIE HARE CORONADepartment: ER  MRN:        [id number]                      Room:       T731  Gender:     Male                         Technician: [id number]  :        1985                   Requested By:KEITH CALERO  Order #:    [id number]                    Reading MD: Keith Calero    Measurements  Intervals                                Axis  Rate:       101                          P:          55  AZ:         116                          QRS:        60  QRSD:       76                           T:          -67  QT:         339  QTc:        439    Interpretive Statements  Sinus tachycardia rate 101  Normal axis  ST elevation V2-V5  No ST depression  T waves inverted inferiorly and V5-V6  Probable left atrial enlargement  Nonspecific T abnormalities, inferior leads  Borderline ST elevation, anterior leads  Compared to ECG 2019 09:14:18  T-wave abnormality now present  ST (T wave ) deviation now present  Sinus rhythm no longer present  Electronically Signed On 2021 23:50:32 PDT by Keith Calero         1539:  Called a Code STEMI    1539:  Dr. Fernandez reviewed EKGs and says not a STEMI, likely LVH.  She will evaluate patient for ACS.  She would like to call off the Cath Lab.    1730:  Discussed with Dr. Barksdale, hospitalist on-call, and he will kindly evaluate the patient hospitalization.    Patient presents to the ER complaining of right-sided abdominal pain which began just prior to arrival.  Patient recently started drinking alcohol again.  He had 8 beers last night.  He had an episode of vomiting last night.  No diarrhea.  No chest pain or shortness of  breath.  His pain resolved shortly after arrival to the ER.  EKG looks very concerning for STEMI.  A code STEMI was called and Dr. Fernandez evaluate the patient.  She does not think EKG represents STEMI and does not think patient needs to go to Cath Lab.  She has placed recommendations in the chart.  Troponin came back at 7.  Patient's lipase is elevated at 111.  His LFTs are also elevated consistent with alcohol abuse.  The patient had an elevated blood sugar with greater than thousand glucose in his urine.  Hemoglobin A1c is elevated at 9.  Patient was not aware he is diabetic.  Clearly this is a new diagnosis for him.  This hyperglycemia could in someway be contributing to his abdominal pain.  CT scan is negative except for hepatic steatosis.  Gallbladder ultrasound is negative as well except for finding consistent with pancreatitis.  At this time patient will need to be hospitalized for new onset diabetes, abnormal EKG, pancreatitis, and possible early withdrawal as he continues to be tachycardic and hypertensive here in the ER.  I spoke with Dr. Barksdale, hospitalist on-call, and will kindly evaluate the patient hospitalization.    I verified that the patient was wearing a mask and I was wearing appropriate PPE every time I entered the room. The patient's mask was on the patient at all times during my     FINAL IMPRESSION  1. Hyperglycemia Acute    2. Abdominal pain, unspecified abdominal location Acute    3. Alcohol-induced acute pancreatitis, unspecified complication status Acute    4. Abnormal EKG Acute    5. Alcohol abuse Acute    .     This dictation has been created using voice recognition software. The accuracy of the dictation is limited by the abilities of the software. I expect there may be some errors of grammar and possibly content. I made every attempt to manually correct the errors within my dictation. However, errors related to voice recognition software may still exist and should be interpreted  within the appropriate context.    Electronically signed by: Lulu Caleor M.D., 4/7/2021 2:35 PM

## 2021-04-08 ENCOUNTER — PATIENT OUTREACH (OUTPATIENT)
Dept: HEALTH INFORMATION MANAGEMENT | Facility: OTHER | Age: 36
End: 2021-04-08

## 2021-04-08 PROBLEM — E11.9 TYPE 2 DIABETES MELLITUS WITHOUT COMPLICATION, WITHOUT LONG-TERM CURRENT USE OF INSULIN (HCC): Status: ACTIVE | Noted: 2021-04-07

## 2021-04-08 LAB
ALBUMIN SERPL BCP-MCNC: 3.8 G/DL (ref 3.2–4.9)
ALBUMIN/GLOB SERPL: 1.4 G/DL
ALP SERPL-CCNC: 161 U/L (ref 30–99)
ALT SERPL-CCNC: 79 U/L (ref 2–50)
ANION GAP SERPL CALC-SCNC: 8 MMOL/L (ref 7–16)
AST SERPL-CCNC: 35 U/L (ref 12–45)
BILIRUB SERPL-MCNC: 0.5 MG/DL (ref 0.1–1.5)
BUN SERPL-MCNC: 8 MG/DL (ref 8–22)
CALCIUM SERPL-MCNC: 8.7 MG/DL (ref 8.5–10.5)
CHLORIDE SERPL-SCNC: 104 MMOL/L (ref 96–112)
CO2 SERPL-SCNC: 25 MMOL/L (ref 20–33)
CREAT SERPL-MCNC: 0.73 MG/DL (ref 0.5–1.4)
ERYTHROCYTE [DISTWIDTH] IN BLOOD BY AUTOMATED COUNT: 47.7 FL (ref 35.9–50)
GLOBULIN SER CALC-MCNC: 2.7 G/DL (ref 1.9–3.5)
GLUCOSE BLD-MCNC: 144 MG/DL (ref 65–99)
GLUCOSE BLD-MCNC: 145 MG/DL (ref 65–99)
GLUCOSE BLD-MCNC: 176 MG/DL (ref 65–99)
GLUCOSE BLD-MCNC: 205 MG/DL (ref 65–99)
GLUCOSE BLD-MCNC: 221 MG/DL (ref 65–99)
GLUCOSE SERPL-MCNC: 142 MG/DL (ref 65–99)
HCT VFR BLD AUTO: 45.7 % (ref 42–52)
HGB BLD-MCNC: 14.9 G/DL (ref 14–18)
LIPASE SERPL-CCNC: 210 U/L (ref 11–82)
LIPASE SERPL-CCNC: 54 U/L (ref 11–82)
MCH RBC QN AUTO: 30.1 PG (ref 27–33)
MCHC RBC AUTO-ENTMCNC: 32.6 G/DL (ref 33.7–35.3)
MCV RBC AUTO: 92.3 FL (ref 81.4–97.8)
PLATELET # BLD AUTO: 235 K/UL (ref 164–446)
PMV BLD AUTO: 12.4 FL (ref 9–12.9)
POTASSIUM SERPL-SCNC: 3.4 MMOL/L (ref 3.6–5.5)
PROT SERPL-MCNC: 6.5 G/DL (ref 6–8.2)
RBC # BLD AUTO: 4.95 M/UL (ref 4.7–6.1)
SODIUM SERPL-SCNC: 137 MMOL/L (ref 135–145)
TROPONIN T SERPL-MCNC: 9 NG/L (ref 6–19)
TROPONIN T SERPL-MCNC: 9 NG/L (ref 6–19)
WBC # BLD AUTO: 10.3 K/UL (ref 4.8–10.8)

## 2021-04-08 PROCEDURE — 700102 HCHG RX REV CODE 250 W/ 637 OVERRIDE(OP): Performed by: INTERNAL MEDICINE

## 2021-04-08 PROCEDURE — 770020 HCHG ROOM/CARE - TELE (206)

## 2021-04-08 PROCEDURE — 700102 HCHG RX REV CODE 250 W/ 637 OVERRIDE(OP): Performed by: HOSPITALIST

## 2021-04-08 PROCEDURE — 80053 COMPREHEN METABOLIC PANEL: CPT

## 2021-04-08 PROCEDURE — A9270 NON-COVERED ITEM OR SERVICE: HCPCS | Performed by: INTERNAL MEDICINE

## 2021-04-08 PROCEDURE — 700105 HCHG RX REV CODE 258: Performed by: HOSPITALIST

## 2021-04-08 PROCEDURE — 83690 ASSAY OF LIPASE: CPT

## 2021-04-08 PROCEDURE — A9270 NON-COVERED ITEM OR SERVICE: HCPCS | Performed by: HOSPITALIST

## 2021-04-08 PROCEDURE — 99233 SBSQ HOSP IP/OBS HIGH 50: CPT | Performed by: INTERNAL MEDICINE

## 2021-04-08 PROCEDURE — 82962 GLUCOSE BLOOD TEST: CPT

## 2021-04-08 PROCEDURE — 85027 COMPLETE CBC AUTOMATED: CPT

## 2021-04-08 PROCEDURE — 36415 COLL VENOUS BLD VENIPUNCTURE: CPT

## 2021-04-08 PROCEDURE — 700111 HCHG RX REV CODE 636 W/ 250 OVERRIDE (IP): Performed by: HOSPITALIST

## 2021-04-08 PROCEDURE — 84484 ASSAY OF TROPONIN QUANT: CPT | Mod: 91

## 2021-04-08 RX ORDER — METOPROLOL TARTRATE 50 MG/1
50 TABLET, FILM COATED ORAL TWICE DAILY
Status: DISCONTINUED | OUTPATIENT
Start: 2021-04-08 | End: 2021-04-09 | Stop reason: HOSPADM

## 2021-04-08 RX ORDER — POTASSIUM CHLORIDE 20 MEQ/1
40 TABLET, EXTENDED RELEASE ORAL ONCE
Status: COMPLETED | OUTPATIENT
Start: 2021-04-08 | End: 2021-04-08

## 2021-04-08 RX ORDER — CLONIDINE HYDROCHLORIDE 0.1 MG/1
0.1 TABLET ORAL ONCE
Status: COMPLETED | OUTPATIENT
Start: 2021-04-08 | End: 2021-04-08

## 2021-04-08 RX ADMIN — ENOXAPARIN SODIUM 40 MG: 40 INJECTION SUBCUTANEOUS at 05:18

## 2021-04-08 RX ADMIN — POTASSIUM CHLORIDE 40 MEQ: 1500 TABLET, EXTENDED RELEASE ORAL at 08:32

## 2021-04-08 RX ADMIN — ENALAPRILAT 1.25 MG: 1.25 INJECTION INTRAVENOUS at 20:44

## 2021-04-08 RX ADMIN — INSULIN HUMAN 3 UNITS: 100 INJECTION, SOLUTION PARENTERAL at 19:58

## 2021-04-08 RX ADMIN — TRAZODONE HYDROCHLORIDE 50 MG: 50 TABLET ORAL at 21:25

## 2021-04-08 RX ADMIN — METOPROLOL TARTRATE 25 MG: 25 TABLET, FILM COATED ORAL at 05:19

## 2021-04-08 RX ADMIN — LISINOPRIL 20 MG: 20 TABLET ORAL at 05:19

## 2021-04-08 RX ADMIN — ENALAPRILAT 1.25 MG: 1.25 INJECTION INTRAVENOUS at 19:34

## 2021-04-08 RX ADMIN — SODIUM CHLORIDE: 9 INJECTION, SOLUTION INTRAVENOUS at 16:06

## 2021-04-08 RX ADMIN — METOPROLOL TARTRATE 50 MG: 50 TABLET, FILM COATED ORAL at 17:03

## 2021-04-08 RX ADMIN — Medication 100 MG: at 17:03

## 2021-04-08 RX ADMIN — CLONIDINE HYDROCHLORIDE 0.1 MG: 0.1 TABLET ORAL at 21:40

## 2021-04-08 RX ADMIN — INSULIN HUMAN 2 UNITS: 100 INJECTION, SOLUTION PARENTERAL at 11:41

## 2021-04-08 RX ADMIN — OMEPRAZOLE 20 MG: 20 CAPSULE, DELAYED RELEASE ORAL at 17:03

## 2021-04-08 RX ADMIN — SODIUM CHLORIDE: 9 INJECTION, SOLUTION INTRAVENOUS at 05:19

## 2021-04-08 RX ADMIN — ENALAPRILAT 1.25 MG: 1.25 INJECTION INTRAVENOUS at 07:39

## 2021-04-08 RX ADMIN — OMEPRAZOLE 20 MG: 20 CAPSULE, DELAYED RELEASE ORAL at 05:18

## 2021-04-08 RX ADMIN — INSULIN HUMAN 3 UNITS: 100 INJECTION, SOLUTION PARENTERAL at 08:28

## 2021-04-08 SDOH — ECONOMIC STABILITY: FOOD INSECURITY: WITHIN THE PAST 12 MONTHS, YOU WORRIED THAT YOUR FOOD WOULD RUN OUT BEFORE YOU GOT MONEY TO BUY MORE.: NEVER TRUE

## 2021-04-08 SDOH — ECONOMIC STABILITY: FOOD INSECURITY: WITHIN THE PAST 12 MONTHS, THE FOOD YOU BOUGHT JUST DIDN'T LAST AND YOU DIDN'T HAVE MONEY TO GET MORE.: NEVER TRUE

## 2021-04-08 SDOH — ECONOMIC STABILITY: TRANSPORTATION INSECURITY
IN THE PAST 12 MONTHS, HAS LACK OF TRANSPORTATION KEPT YOU FROM MEETINGS, WORK, OR FROM GETTING THINGS NEEDED FOR DAILY LIVING?: NO

## 2021-04-08 SDOH — ECONOMIC STABILITY: TRANSPORTATION INSECURITY
IN THE PAST 12 MONTHS, HAS THE LACK OF TRANSPORTATION KEPT YOU FROM MEDICAL APPOINTMENTS OR FROM GETTING MEDICATIONS?: NO

## 2021-04-08 ASSESSMENT — LIFESTYLE VARIABLES: SUBSTANCE_ABUSE: 1

## 2021-04-08 ASSESSMENT — ENCOUNTER SYMPTOMS
VOMITING: 0
FEVER: 0
ABDOMINAL PAIN: 1
BLOOD IN STOOL: 0
HEARTBURN: 0
PALPITATIONS: 0
DIARRHEA: 0
NAUSEA: 0
SHORTNESS OF BREATH: 0
DIZZINESS: 0
BACK PAIN: 0
CHILLS: 0
SORE THROAT: 0
COUGH: 0
CONSTIPATION: 0

## 2021-04-08 ASSESSMENT — PAIN DESCRIPTION - PAIN TYPE
TYPE: ACUTE PAIN

## 2021-04-08 ASSESSMENT — SOCIAL DETERMINANTS OF HEALTH (SDOH): HOW HARD IS IT FOR YOU TO PAY FOR THE VERY BASICS LIKE FOOD, HOUSING, MEDICAL CARE, AND HEATING?: NOT VERY HARD

## 2021-04-08 NOTE — CARE PLAN
Problem: Pain Management  Goal: Pain level will decrease to patient's comfort goal  Outcome: PROGRESSING AS EXPECTED     Problem: Safety  Goal: Will remain free from injury  Outcome: PROGRESSING AS EXPECTED  Goal: Will remain free from falls  Outcome: PROGRESSING AS EXPECTED     Problem: Discharge Barriers/Planning  Goal: Patient's continuum of care needs will be met  Outcome: PROGRESSING AS EXPECTED

## 2021-04-08 NOTE — CARE PLAN
Problem: Pain Management  Goal: Pain level will decrease to patient's comfort goal  Outcome: PROGRESSING AS EXPECTED     Problem: Communication  Goal: The ability to communicate needs accurately and effectively will improve  Outcome: PROGRESSING AS EXPECTED  Intervention: [place] patient and significant other/support system to call light to alert staff of needs  Flowsheets (Taken 4/8/2021 1010)  Oriented to:: All of the Following : Location of Bathroom, Visiting Policy, Unit Routine, Call Light and Bedside Controls, Bedside Rail Policy, Smoking Policy, Rights and Responsibilities, Bedside Report, and Patient Education Notebook

## 2021-04-08 NOTE — ASSESSMENT & PLAN NOTE
May be related to pancreatitis  a1c 9.0  Will start metformin on discharge   I have many times that diabetes is excellent controled once alcohol stopped

## 2021-04-08 NOTE — ASSESSMENT & PLAN NOTE
Most likely related to LVH  Serial troponins x3 negative   Echocardiogram unremarkable.   Continue to monitor    cardiology consult/stress test as OP referral to be placed on discharge

## 2021-04-08 NOTE — H&P
"Hospital Medicine History & Physical Note    Date of Service  4/7/2021    Primary Care Physician  MAIKEL Hamilton.    Consultants  none    Code Status  Full Code    Chief Complaint  Chief Complaint   Patient presents with   • Abdominal Pain     RLQ pain started around 1345 today and was described as \"wrost pain of my life.\" The patient has a history of gastritis and pancreatitis and appendectomy. No painful urination. Normal bowel movements.       History of Presenting Illness  35 y.o. male who presented to emergency department on 4/7/2021 with past medical history of alcohol abuse  complaining of right upper quadrant abdominal pain.  He states the pain started around 2 PM and describes it as the worse pain of his life.  Intensity 9 out of 10 crampy pain constant that began to involve the left upper quadrant and then radiated to his back.  He denies any fever chills chest pain or shortness of breath.  Nausea but no vomiting .he has chronic diarrhea.  Patient was seen and evaluated in the emergency department , his blood pressure was elevated.  We did a routine EKG that was so abnormal that there was concern for STEMI.  Cardiology was consulted and evaluated the EKG findings were consistent with LVH.  His routine labs showed evidence of hyperglycemia.  Patient was given lisinopril in the emergency department but his blood pressure remained elevated.  Patient referred to me further care and management    .  Yesterday he drank 8 or 9 beers.  He says typically he drinks alcohol on the weekends.  He was sober for nearly 2 years but is started drinking alcohol again.      Review of Systems  Review of Systems   Constitutional: Positive for malaise/fatigue.   HENT: Negative for ear pain, hearing loss, nosebleeds and tinnitus.    Eyes: Negative for blurred vision, photophobia, pain and discharge.   Respiratory: Negative for cough, hemoptysis and sputum production.    Cardiovascular: Negative for chest pain, " palpitations, orthopnea and claudication.   Gastrointestinal: Positive for abdominal pain, diarrhea (chronic) and nausea. Negative for blood in stool and constipation.   Genitourinary: Negative for dysuria, hematuria and urgency.   Musculoskeletal: Negative for joint pain, myalgias and neck pain.   Neurological: Negative for dizziness, tingling, tremors, speech change and headaches.   Psychiatric/Behavioral: Negative for depression, hallucinations, substance abuse and suicidal ideas.       Past Medical History   has a past medical history of Alcohol abuse, Alcohol abuse, Alcoholic cirrhosis (HCC), Anxiety, Asthma, Depression, ETOH abuse, Gastritis, Hypertension, Liver failure (HCC), Pancreatitis, and Ulcer.    Surgical History   has a past surgical history that include appendectomy.     Family History  family history includes Diabetes in his brother; Hypertension in his mother.     Social History   reports that he has quit smoking. His smoking use included cigarettes. He has never used smokeless tobacco. He reports current alcohol use. He reports current drug use. Drug: Inhaled.    Allergies  No Known Allergies    Medications  Prior to Admission Medications   Prescriptions Last Dose Informant Patient Reported? Taking?   DULoxetine (CYMBALTA) 60 MG Cap DR Particles delayed-release capsule 4/6/2021 at 2200 Patient Yes Yes   Sig: Take 60 mg by mouth at bedtime. Indications: Major Depressive Disorder   albuterol 108 (90 Base) MCG/ACT Aero Soln inhalation aerosol NOT TAKING at NOT TAKING Patient No No   Sig: Inhale 2 Puffs by mouth every 6 hours as needed for Shortness of Breath.   Patient not taking: Reported on 4/7/2021   amoxicillin-clavulanate (AUGMENTIN) 875-125 MG Tab NOT TAKING at NOT TAKING Patient No No   Sig: Take 1 Tab by mouth 2 times a day.   Patient not taking: Reported on 4/7/2021   meclizine (ANTIVERT) 25 MG Tab NOT TAKING at NOT TAKING Patient No No   Sig: Take 1 Tab by mouth 3 times a day as needed.    Patient not taking: Reported on 4/7/2021   ondansetron (ZOFRAN ODT) 4 MG TABLET DISPERSIBLE NOT TAKING at NOT TAKING Patient No No   Sig: Take 1 Tab by mouth every 6 hours as needed for Nausea.   Patient not taking: Reported on 4/7/2021   traZODone (DESYREL) 50 MG Tab 4/6/2021 at 2200 Patient Yes Yes   Sig: Take  mg by mouth at bedtime as needed for Sleep. 1 to 2 tablets = 50 to 100 mg.  Indications: Trouble Sleeping      Facility-Administered Medications: None       Physical Exam  Temp:  [36.8 °C (98.3 °F)] 36.8 °C (98.3 °F)  Pulse:  [106-112] 112  Resp:  [17-21] 18  BP: (150-180)/() 180/113  SpO2:  [96 %-98 %] 96 %    Physical Exam  Constitutional:       General: He is in acute distress.      Appearance: He is obese. He is not ill-appearing or diaphoretic.   HENT:      Nose: Nose normal.      Mouth/Throat:      Mouth: Mucous membranes are moist.   Eyes:      General: No scleral icterus.        Left eye: No discharge.      Extraocular Movements: Extraocular movements intact.   Cardiovascular:      Rate and Rhythm: Normal rate and regular rhythm.   Pulmonary:      Effort: Pulmonary effort is normal. No respiratory distress.      Breath sounds: No stridor. No wheezing, rhonchi or rales.   Chest:      Chest wall: No tenderness.   Abdominal:      General: There is distension.      Tenderness: There is abdominal tenderness (luq).   Musculoskeletal:         General: No tenderness or signs of injury. Normal range of motion.      Cervical back: Normal range of motion.      Left lower leg: No edema.   Skin:     Capillary Refill: Capillary refill takes 2 to 3 seconds.      Coloration: Skin is not jaundiced.      Findings: No bruising or lesion.   Neurological:      General: No focal deficit present.      Mental Status: He is oriented to person, place, and time.      Cranial Nerves: No cranial nerve deficit.      Sensory: No sensory deficit.      Motor: No weakness.      Gait: Gait normal.   Psychiatric:          Mood and Affect: Mood normal.         Laboratory:  Recent Labs     04/07/21  1415   WBC 12.0*   RBC 5.18   HEMOGLOBIN 15.9   HEMATOCRIT 47.1   MCV 90.9   MCH 30.7   MCHC 33.8   RDW 47.1   PLATELETCT 254   MPV 12.1     Recent Labs     04/07/21  1415   SODIUM 139   POTASSIUM 3.9   CHLORIDE 105   CO2 21   GLUCOSE 215*   BUN 8   CREATININE 0.80   CALCIUM 8.9     Recent Labs     04/07/21  1415   ALTSGPT 91*   ASTSGOT 36   ALKPHOSPHAT 171*   TBILIRUBIN 0.4   LIPASE 111*   GLUCOSE 215*         No results for input(s): NTPROBNP in the last 72 hours.      No results for input(s): TROPONINT in the last 72 hours.    Imaging:  CT-ABDOMEN-PELVIS WITH   Final Result      1.  No acute abnormality in the abdomen or pelvis.   2.  Hepatic steatosis.      DX-CHEST-LIMITED (1 VIEW)   Final Result         No acute cardiopulmonary abnormalities are identified.      US-RUQ   Final Result      1.  Decreased pancreatic echogenicity, which may be due to pancreatitis.   2.  No gallstones.   3.  Increased hepatic echogenicity, suggestive of steatosis and/or hepatocellular disease.         EC-ECHOCARDIOGRAM COMPLETE W/O CONT    (Results Pending)       EKG reviewed by me ST elevation noted anterior leads     Sinus tach  of 101     T wave inversion noted V5 V6  Assessment/Plan:  I anticipate this patient will require at least two midnights for appropriate medical management, necessitating inpatient admission.    * Acute alcoholic pancreatitis- (present on admission)  Assessment & Plan  Pain control  Oxycodone and iv morphine    Follow lipase    Clear liquid diet- advance as tolerated    Abnormal EKG- (present on admission)  Assessment & Plan  Most likely related to LVH    Serial troponins    Check echocardiogram    Acute alcoholic gastritis without hemorrhage- (present on admission)  Assessment & Plan  Started on po prilosec po bid    Hyperglycemia- (present on admission)  Assessment & Plan  May be related to pancreatitis    Check hba1c    fs  with ssi        Mild asthma without complication- (present on admission)  Assessment & Plan  Not in exacerbation    bronchodiltors as needed    HTN (hypertension)- (present on admission)  Assessment & Plan  Uncontrolled    Started on lopressor and lisinopril    Prn iv vasotec      Check a.m. CBC CMP lipase

## 2021-04-08 NOTE — PROGRESS NOTES
Pt in transported to floor, transport by [facility] RN: Andrea. Bedside report received and patient care assumed. Pt is resting in bed, A&Ox4, with no complaints of pain, and is on R/A. Tele box on. Monitor room notified with rhythm confirmed. All fall precautions are in place, belongings at bedside table.  Pt was updated on POC, no questions or concerns. Pt educated on use of call light for assistance.

## 2021-04-08 NOTE — PROGRESS NOTES
Bedside report received from night RN; assumed pt care. Pt assessment complete. Pt A&OX4 Reviewed plan of care with pt. Tele box on and rhythm verified. Chart and labs reviewed. Bed in lowest position, and 2 side rails up. Pt educated on call light; call light with in reach.

## 2021-04-08 NOTE — PROGRESS NOTES
Community Health Worker [facility] introduced Community Care Management to the patient. Patient reports that he is established with CRISTIAN Hamilton at [facility]. Patient is available any time after 4pm for a hospital follow up. Patient reports that he lives in an apartment with his girlfriend. Patient is self driving with 2 vehicles, and does not report any food insecurity. Patient does not utilize any medical equipment at home. Patient has no other needs.     Community Health Worker Intake  • Social determinates of health intake complete.  • Identified no barriers.  • Contact information provided to Leo Medellin   • Inpatient assessment completed.    Plan: CHW has called the [facility] who reports that they will call the patient to schedule a hospital follow up. CHW will follow up with the patient after DC.

## 2021-04-08 NOTE — ASSESSMENT & PLAN NOTE
Uncontrolled  Started on lopressor and lisinopril  Prn iv vasotec  4/8- improving. Continue to monitor

## 2021-04-08 NOTE — ASSESSMENT & PLAN NOTE
Pain control  Oxycodone and iv morphine  4/8- lipase slight elevated, however pt feeling better    advance to GI  Continue IVF  Monitor LFT

## 2021-04-08 NOTE — PROGRESS NOTES
[facility] Medicine Daily Progress Note    Date of Service  4/8/2021    Chief Complaint  35 y.o. male admitted 4/7/2021 with right upper abdominal quadrant pain    Hospital Course  35-year-old Male past medical history of alcohol abuse, advanced alcoholic cirrhosis, presented to emergency room 4/7/2021 with right upper quadrant abdominal pain 10 out of 10.  EKG was concerning for T inversion in the l anterior leads.  Troponin negative x3.  Admitted for pancreatitis, IV fluids, alcohol withdrawal.  Echocardiogram ordered for further evaluation and it was unremarkable.   Pain had improved significantly following day. Pt motivated to quit alcohol.       Interval Problem Update  Feeling better. Pain almost resolved.  His last drink was 2 days ago. Will continue to monitor overnight. Continue IVF, hopefully dc tomorrow     Consultants/Specialty  None     Code Status  Full Code    Disposition  Inpatient     Review of Systems  Review of Systems   Constitutional: Positive for malaise/fatigue. Negative for chills and fever.   HENT: Negative for sore throat.    Respiratory: Negative for cough and shortness of breath.    Cardiovascular: Negative for chest pain, palpitations and leg swelling.   Gastrointestinal: Positive for abdominal pain. Negative for blood in stool, constipation, diarrhea, heartburn, nausea and vomiting.   Genitourinary: Negative for dysuria.   Musculoskeletal: Negative for back pain.   Neurological: Negative for dizziness.   Psychiatric/Behavioral: Positive for substance abuse (alcohol).        Physical Exam  Temp:  [36 °C (96.8 °F)-36.8 °C (98.3 °F)] 36.2 °C (97.1 °F)  Pulse:  [] 86  Resp:  [17-29] 18  BP: (140-185)/() 152/116  SpO2:  [93 %-98 %] 96 %    Physical Exam  Vitals and nursing note reviewed.   Constitutional:       General: He is not in acute distress.     Appearance: He is obese. He is not ill-appearing.   HENT:      Head: Normocephalic and atraumatic.   Eyes:      General: No scleral  icterus.     Conjunctiva/sclera: Conjunctivae normal.   Cardiovascular:      Rate and Rhythm: Normal rate.      Heart sounds: No murmur.   Pulmonary:      Effort: Pulmonary effort is normal. No respiratory distress.      Breath sounds: No wheezing or rales.   Abdominal:      General: There is distension.      Palpations: Abdomen is soft.      Tenderness: There is abdominal tenderness. There is no guarding.   Musculoskeletal:         General: No swelling or deformity. Normal range of motion.   Skin:     Coloration: Skin is not jaundiced.   Neurological:      Mental Status: He is oriented to person, place, and time.      Cranial Nerves: No cranial nerve deficit.   Psychiatric:         Mood and Affect: Mood normal.         Behavior: Behavior normal.         Thought Content: Thought content normal.         Judgment: Judgment normal.         Fluids    Intake/Output Summary (Last 24 hours) at 4/8/2021 1157  Last data filed at 4/7/2021 2100  Gross per 24 hour   Intake 1240 ml   Output --   Net 1240 ml       Laboratory  Recent Labs     04/07/21  1415 04/08/21  0022   WBC 12.0* 10.3   RBC 5.18 4.95   HEMOGLOBIN 15.9 14.9   HEMATOCRIT 47.1 45.7   MCV 90.9 92.3   MCH 30.7 30.1   MCHC 33.8 32.6*   RDW 47.1 47.7   PLATELETCT 254 235   MPV 12.1 12.4     Recent Labs     04/07/21  1415 04/08/21  0022   SODIUM 139 137   POTASSIUM 3.9 3.4*   CHLORIDE 105 104   CO2 21 25   GLUCOSE 215* 142*   BUN 8 8   CREATININE 0.80 0.73   CALCIUM 8.9 8.7                   Imaging  EC-ECHOCARDIOGRAM COMPLETE W/O CONT   Final Result      CT-ABDOMEN-PELVIS WITH   Final Result      1.  No acute abnormality in the abdomen or pelvis.   2.  Hepatic steatosis.      DX-CHEST-LIMITED (1 VIEW)   Final Result         No acute cardiopulmonary abnormalities are identified.      US-RUQ   Final Result      1.  Decreased pancreatic echogenicity, which may be due to pancreatitis.   2.  No gallstones.   3.  Increased hepatic echogenicity, suggestive of steatosis  and/or hepatocellular disease.              Assessment/Plan  * Acute alcoholic pancreatitis- (present on admission)  Assessment & Plan  Pain control  Oxycodone and iv morphine  4/8- lipase slight elevated, however pt feeling better    advance to GI  Continue IVF  Monitor LFT     Abnormal EKG- (present on admission)  Assessment & Plan  Most likely related to LVH  Serial troponins x3 negative   Echocardiogram unremarkable.   Continue to monitor    cardiology consult/stress test as OP referral to be placed on discharge       Acute alcoholic gastritis without hemorrhage- (present on admission)  Assessment & Plan  Started on po prilosec po bid    Type 2 diabetes mellitus without complication, without long-term current use of insulin (HCC)- (present on admission)  Assessment & Plan  May be related to pancreatitis  a1c 9.0  Will start metformin on discharge   I have many times that diabetes is excellent controled once alcohol stopped       Mild asthma without complication- (present on admission)  Assessment & Plan  Not in exacerbation    bronchodiltors as needed    HTN (hypertension)- (present on admission)  Assessment & Plan  Uncontrolled  Started on lopressor and lisinopril  Prn iv vasotec  4/8- improving. Continue to monitor        VTE prophylaxis: lovenox

## 2021-04-08 NOTE — PROGRESS NOTES
2 RN skin check complete.   Devices in place: Tele box.  Skin assessed under devices: Yes.  Confirmed pressure ulcers found on N/A.  New potential pressure ulcers noted on N/A. Wound consult placed N/A.  The following interventions in place: Extra pillow for positioning and support, pt turns self from side to side.

## 2021-04-08 NOTE — HOSPITAL COURSE
35-year-old Male past medical history of alcohol abuse, advanced alcoholic cirrhosis, presented to emergency room 4/7/2021 with right upper quadrant abdominal pain 10 out of 10.  EKG was concerning for T inversion in the l anterior leads.  Troponin negative x3.  Admitted for pancreatitis, IV fluids, alcohol withdrawal.  Echocardiogram ordered for further evaluation and it was unremarkable.   Pain had improved significantly following day. Pt motivated to quit alcohol.

## 2021-04-09 VITALS
HEART RATE: 105 BPM | WEIGHT: 205.69 LBS | DIASTOLIC BLOOD PRESSURE: 98 MMHG | SYSTOLIC BLOOD PRESSURE: 148 MMHG | RESPIRATION RATE: 18 BRPM | TEMPERATURE: 98 F | OXYGEN SATURATION: 97 % | HEIGHT: 67 IN | BODY MASS INDEX: 32.28 KG/M2

## 2021-04-09 PROBLEM — K85.20 ACUTE ALCOHOLIC PANCREATITIS: Status: RESOLVED | Noted: 2021-04-07 | Resolved: 2021-04-09

## 2021-04-09 PROBLEM — R10.9 ABDOMINAL PAIN: Status: ACTIVE | Noted: 2021-04-09

## 2021-04-09 LAB
ALBUMIN SERPL BCP-MCNC: 3.8 G/DL (ref 3.2–4.9)
ALBUMIN/GLOB SERPL: 1.3 G/DL
ALP SERPL-CCNC: 157 U/L (ref 30–99)
ALT SERPL-CCNC: 73 U/L (ref 2–50)
ANION GAP SERPL CALC-SCNC: 9 MMOL/L (ref 7–16)
AST SERPL-CCNC: 36 U/L (ref 12–45)
BASOPHILS # BLD AUTO: 0.5 % (ref 0–1.8)
BASOPHILS # BLD: 0.04 K/UL (ref 0–0.12)
BILIRUB SERPL-MCNC: 0.4 MG/DL (ref 0.1–1.5)
BUN SERPL-MCNC: 7 MG/DL (ref 8–22)
CALCIUM SERPL-MCNC: 8.9 MG/DL (ref 8.5–10.5)
CHLORIDE SERPL-SCNC: 103 MMOL/L (ref 96–112)
CO2 SERPL-SCNC: 23 MMOL/L (ref 20–33)
CREAT SERPL-MCNC: 0.74 MG/DL (ref 0.5–1.4)
EOSINOPHIL # BLD AUTO: 0.12 K/UL (ref 0–0.51)
EOSINOPHIL NFR BLD: 1.4 % (ref 0–6.9)
ERYTHROCYTE [DISTWIDTH] IN BLOOD BY AUTOMATED COUNT: 46.6 FL (ref 35.9–50)
GLOBULIN SER CALC-MCNC: 3 G/DL (ref 1.9–3.5)
GLUCOSE BLD-MCNC: 169 MG/DL (ref 65–99)
GLUCOSE BLD-MCNC: 251 MG/DL (ref 65–99)
GLUCOSE SERPL-MCNC: 172 MG/DL (ref 65–99)
HCT VFR BLD AUTO: 47.5 % (ref 42–52)
HGB BLD-MCNC: 15.7 G/DL (ref 14–18)
IMM GRANULOCYTES # BLD AUTO: 0.06 K/UL (ref 0–0.11)
IMM GRANULOCYTES NFR BLD AUTO: 0.7 % (ref 0–0.9)
LYMPHOCYTES # BLD AUTO: 3.24 K/UL (ref 1–4.8)
LYMPHOCYTES NFR BLD: 36.5 % (ref 22–41)
MCH RBC QN AUTO: 30.4 PG (ref 27–33)
MCHC RBC AUTO-ENTMCNC: 33.1 G/DL (ref 33.7–35.3)
MCV RBC AUTO: 91.9 FL (ref 81.4–97.8)
MONOCYTES # BLD AUTO: 0.74 K/UL (ref 0–0.85)
MONOCYTES NFR BLD AUTO: 8.3 % (ref 0–13.4)
NEUTROPHILS # BLD AUTO: 4.67 K/UL (ref 1.82–7.42)
NEUTROPHILS NFR BLD: 52.6 % (ref 44–72)
NRBC # BLD AUTO: 0 K/UL
NRBC BLD-RTO: 0 /100 WBC
PLATELET # BLD AUTO: 229 K/UL (ref 164–446)
PMV BLD AUTO: 12.4 FL (ref 9–12.9)
POTASSIUM SERPL-SCNC: 3.8 MMOL/L (ref 3.6–5.5)
PROT SERPL-MCNC: 6.8 G/DL (ref 6–8.2)
RBC # BLD AUTO: 5.17 M/UL (ref 4.7–6.1)
SODIUM SERPL-SCNC: 135 MMOL/L (ref 135–145)
WBC # BLD AUTO: 8.9 K/UL (ref 4.8–10.8)

## 2021-04-09 PROCEDURE — 700111 HCHG RX REV CODE 636 W/ 250 OVERRIDE (IP): Performed by: STUDENT IN AN ORGANIZED HEALTH CARE EDUCATION/TRAINING PROGRAM

## 2021-04-09 PROCEDURE — 700111 HCHG RX REV CODE 636 W/ 250 OVERRIDE (IP): Performed by: HOSPITALIST

## 2021-04-09 PROCEDURE — A9270 NON-COVERED ITEM OR SERVICE: HCPCS | Performed by: HOSPITALIST

## 2021-04-09 PROCEDURE — 36415 COLL VENOUS BLD VENIPUNCTURE: CPT

## 2021-04-09 PROCEDURE — 85025 COMPLETE CBC W/AUTO DIFF WBC: CPT

## 2021-04-09 PROCEDURE — 700105 HCHG RX REV CODE 258: Performed by: HOSPITALIST

## 2021-04-09 PROCEDURE — 700102 HCHG RX REV CODE 250 W/ 637 OVERRIDE(OP): Performed by: INTERNAL MEDICINE

## 2021-04-09 PROCEDURE — 82962 GLUCOSE BLOOD TEST: CPT

## 2021-04-09 PROCEDURE — 700102 HCHG RX REV CODE 250 W/ 637 OVERRIDE(OP): Performed by: HOSPITALIST

## 2021-04-09 PROCEDURE — 80053 COMPREHEN METABOLIC PANEL: CPT

## 2021-04-09 PROCEDURE — 99239 HOSP IP/OBS DSCHRG MGMT >30: CPT | Performed by: INTERNAL MEDICINE

## 2021-04-09 PROCEDURE — A9270 NON-COVERED ITEM OR SERVICE: HCPCS | Performed by: INTERNAL MEDICINE

## 2021-04-09 RX ORDER — LISINOPRIL 20 MG/1
40 TABLET ORAL DAILY
Status: DISCONTINUED | OUTPATIENT
Start: 2021-04-09 | End: 2021-04-09 | Stop reason: HOSPADM

## 2021-04-09 RX ORDER — HYDROXYZINE 50 MG/1
50 TABLET, FILM COATED ORAL 3 TIMES DAILY PRN
Status: DISCONTINUED | OUTPATIENT
Start: 2021-04-09 | End: 2021-04-09 | Stop reason: HOSPADM

## 2021-04-09 RX ORDER — AMLODIPINE BESYLATE 10 MG/1
10 TABLET ORAL
Status: DISCONTINUED | OUTPATIENT
Start: 2021-04-09 | End: 2021-04-09 | Stop reason: HOSPADM

## 2021-04-09 RX ORDER — HYDRALAZINE HYDROCHLORIDE 20 MG/ML
20 INJECTION INTRAMUSCULAR; INTRAVENOUS ONCE
Status: COMPLETED | OUTPATIENT
Start: 2021-04-09 | End: 2021-04-09

## 2021-04-09 RX ORDER — METFORMIN HYDROCHLORIDE 500 MG/1
500 TABLET, EXTENDED RELEASE ORAL DAILY
Qty: 90 TABLET | Refills: 0 | Status: SHIPPED | OUTPATIENT
Start: 2021-04-09

## 2021-04-09 RX ORDER — CLONIDINE HYDROCHLORIDE 0.1 MG/1
0.1 TABLET ORAL 2 TIMES DAILY PRN
Status: DISCONTINUED | OUTPATIENT
Start: 2021-04-09 | End: 2021-04-09 | Stop reason: HOSPADM

## 2021-04-09 RX ORDER — LISINOPRIL 40 MG/1
40 TABLET ORAL DAILY
Qty: 90 TABLET | Refills: 0 | Status: SHIPPED | OUTPATIENT
Start: 2021-04-10

## 2021-04-09 RX ORDER — OMEPRAZOLE 20 MG/1
20 CAPSULE, DELAYED RELEASE ORAL 2 TIMES DAILY
Qty: 180 CAPSULE | Refills: 0 | Status: SHIPPED | OUTPATIENT
Start: 2021-04-09 | End: 2022-02-10

## 2021-04-09 RX ORDER — DULOXETIN HYDROCHLORIDE 60 MG/1
60 CAPSULE, DELAYED RELEASE ORAL
Status: DISCONTINUED | OUTPATIENT
Start: 2021-04-09 | End: 2021-04-09 | Stop reason: HOSPADM

## 2021-04-09 RX ORDER — AMLODIPINE BESYLATE 10 MG/1
10 TABLET ORAL DAILY
Qty: 90 TABLET | Refills: 0 | Status: SHIPPED | OUTPATIENT
Start: 2021-04-10

## 2021-04-09 RX ORDER — METOPROLOL TARTRATE 50 MG/1
50 TABLET, FILM COATED ORAL 2 TIMES DAILY
Qty: 180 TABLET | Refills: 0 | Status: SHIPPED | OUTPATIENT
Start: 2021-04-09 | End: 2022-02-10

## 2021-04-09 RX ADMIN — LISINOPRIL 20 MG: 20 TABLET ORAL at 05:52

## 2021-04-09 RX ADMIN — INSULIN HUMAN 5 UNITS: 100 INJECTION, SOLUTION PARENTERAL at 12:36

## 2021-04-09 RX ADMIN — ENALAPRILAT 1.25 MG: 1.25 INJECTION INTRAVENOUS at 05:30

## 2021-04-09 RX ADMIN — HYDRALAZINE HYDROCHLORIDE 20 MG: 20 INJECTION INTRAMUSCULAR; INTRAVENOUS at 07:35

## 2021-04-09 RX ADMIN — LISINOPRIL 40 MG: 20 TABLET ORAL at 07:34

## 2021-04-09 RX ADMIN — ENOXAPARIN SODIUM 40 MG: 40 INJECTION SUBCUTANEOUS at 05:51

## 2021-04-09 RX ADMIN — AMLODIPINE BESYLATE 10 MG: 10 TABLET ORAL at 07:34

## 2021-04-09 RX ADMIN — ENALAPRILAT 1.25 MG: 1.25 INJECTION INTRAVENOUS at 04:16

## 2021-04-09 RX ADMIN — HYDROXYZINE HYDROCHLORIDE 50 MG: 50 TABLET, FILM COATED ORAL at 10:34

## 2021-04-09 RX ADMIN — SODIUM CHLORIDE: 9 INJECTION, SOLUTION INTRAVENOUS at 02:12

## 2021-04-09 RX ADMIN — METOPROLOL TARTRATE 50 MG: 50 TABLET, FILM COATED ORAL at 05:52

## 2021-04-09 RX ADMIN — OMEPRAZOLE 20 MG: 20 CAPSULE, DELAYED RELEASE ORAL at 05:52

## 2021-04-09 RX ADMIN — INSULIN HUMAN 2 UNITS: 100 INJECTION, SOLUTION PARENTERAL at 09:01

## 2021-04-09 ASSESSMENT — PAIN DESCRIPTION - PAIN TYPE
TYPE: ACUTE PAIN
TYPE: ACUTE PAIN

## 2021-04-09 NOTE — PROGRESS NOTES
RN notified by monitor room pt had 2.1 second pause. Pt asymptomatic. Pt's BP upon assessment 170/113.  MD Mueller notified.     MD stated to reassess need for Vasotec during AM vitals.

## 2021-04-09 NOTE — PROGRESS NOTES
Pt /110 after doses of Vesotec.     MD Coa notified. Per MD Coa administer  0.1 mg clonidine.     Orders followed, see MAR for details.

## 2021-04-09 NOTE — DISCHARGE SUMMARY
"Discharge Summary    CHIEF COMPLAINT ON ADMISSION  Chief Complaint   Patient presents with   • Abdominal Pain     RLQ pain started around 1345 today and was described as \"wrost pain of my life.\" The patient has a history of gastritis and pancreatitis and appendectomy. No painful urination. Normal bowel movements.       Reason for Admission  EMS     Admission Date  4/7/2021    CODE STATUS  Full Code    HPI & HOSPITAL COURSE    35-year-old Male past medical history of alcohol abuse, advanced alcoholic cirrhosis, presented to emergency room 4/7/2021 with right upper quadrant abdominal pain 10 out of 10.  EKG was concerning for T inversion in the l anterior leads.  Troponin negative x3.  Admitted for pancreatitis, IV fluids, alcohol withdrawal.  Echocardiogram ordered for further evaluation and it was unremarkable. I did refer pt as OP for further eval with cardiology for possible stress test in the future when pt is stable. He tells me that he had a stress test 2 years a ago and it was normal. I did advise at this point to avoid strenuous activity until cleared by cardiology.    Pain had improved significantly following day. Pt motivated to quit alcohol. He did well monitoring for withdraws and he did not have any symptoms. CIWA was discontinued. I did advise to follow up with GI for continuing monitoring. Referral in place   4/9- pt blood pressure very elevated but improved later the day. Patient does report anxiety and home meds resume. That help with controlling BP too. Meds to continue home is amlodipine 10 mg, lisinopril 40 mg daily, metoprolol 50 mg BID.   Also on this admission he was found new onset of diabetes, A1c 9.1. most likely alcohol induced. He was started on metformin  mg daily. Diabetic education provided.       Therefore, he is discharged in good and stable condition to home with close outpatient follow-up.    The patient met 2-midnight criteria for an inpatient stay at the time of " discharge.    Discharge Date  04/09/2021    FOLLOW UP ITEMS POST DISCHARGE  None     DISCHARGE DIAGNOSES  Principal Problem (Resolved):    Acute alcoholic pancreatitis POA: Yes  Active Problems:    HTN (hypertension) POA: Yes    Mild asthma without complication POA: Yes    Type 2 diabetes mellitus without complication, without long-term current use of insulin (HCC) POA: Yes    Acute alcoholic gastritis without hemorrhage POA: Yes    Abnormal EKG POA: Yes    Abdominal pain POA: Yes      FOLLOW UP  No future appointments.  CRISTIAN Hamilton  [address] [place] [id number]  [phone]    Call  The [facility] clinic will call you after you discharge to schedule a hospital follow up.       MEDICATIONS ON DISCHARGE     Medication List      START taking these medications      Instructions   amLODIPine 10 MG Tabs  Start taking on: April 10, 2021  Commonly known as: NORVASC   Take 1 tablet by mouth every day.  Dose: 10 mg     lisinopril 40 MG tablet  Start taking on: April 10, 2021  Commonly known as: PRINIVIL   Take 1 tablet by mouth every day.  Dose: 40 mg     metFORMIN  MG Tb24  Commonly known as: GLUCOPHAGE XR   Take 1 tablet by mouth every day.  Dose: 500 mg     metoprolol tartrate 50 MG Tabs  Commonly known as: LOPRESSOR   Take 1 tablet by mouth 2 times a day.  Dose: 50 mg     omeprazole 20 MG delayed-release capsule  Commonly known as: PRILOSEC   Take 1 capsule by mouth 2 times a day.  Dose: 20 mg        CONTINUE taking these medications      Instructions   albuterol 108 (90 Base) MCG/ACT Aers inhalation aerosol   Inhale 2 Puffs by mouth every 6 hours as needed for Shortness of Breath.  Dose: 2 Puff     DULoxetine 60 MG Cpep delayed-release capsule  Commonly known as: CYMBALTA   Take 60 mg by mouth at bedtime. Indications: Major Depressive Disorder  Dose: 60 mg     meclizine 25 MG Tabs  Commonly known as: ANTIVERT   Take 1 Tab by mouth 3 times a day as needed.  Dose: 25 mg      traZODone 50 MG Tabs  Commonly known as: DESYREL   Take  mg by mouth at bedtime as needed for Sleep. 1 to 2 tablets = 50 to 100 mg.  Indications: Trouble Sleeping  Dose:  mg        STOP taking these medications    amoxicillin-clavulanate 875-125 MG Tabs  Commonly known as: AUGMENTIN     ondansetron 4 MG Tbdp  Commonly known as: Zofran ODT            Allergies  No Known Allergies    DIET  Orders Placed This Encounter   Procedures   • Diet Order Diet: Low Fiber(GI Soft)     Standing Status:   Standing     Number of Occurrences:   1     Order Specific Question:   Diet:     Answer:   Low Fiber(GI Soft) [2]       ACTIVITY  As tolerated.  Weight bearing as tolerated    CONSULTATIONS  None     PROCEDURES  None     LABORATORY  Lab Results   Component Value Date    SODIUM 135 04/09/2021    POTASSIUM 3.8 04/09/2021    CHLORIDE 103 04/09/2021    CO2 23 04/09/2021    GLUCOSE 172 (H) 04/09/2021    BUN 7 (L) 04/09/2021    CREATININE 0.74 04/09/2021    CREATININE 1.0 04/19/2006        Lab Results   Component Value Date    WBC 8.9 04/09/2021    HEMOGLOBIN 15.7 04/09/2021    HEMATOCRIT 47.5 04/09/2021    PLATELETCT 229 04/09/2021        Total time of the discharge process exceeds 35 minutes.

## 2021-04-09 NOTE — PROGRESS NOTES
Bedside report received and patient care assumed. Pt is resting in bed, A&Ox4, with no complaints of pain, and is on R/A. Tele box on. All fall precautions are in place, belongings at bedside table.  Pt was updated on POC, no questions or concerns. Pt educated on use of call light for assistance.

## 2021-04-09 NOTE — PROGRESS NOTES
Diabetes education: Met with pt this evening. Please see consult note.  Plan: education completed. Pt has a [facility] DM book and questions answered. Please send text via Voalte if needs change.

## 2021-04-09 NOTE — PROGRESS NOTES
Patient discharged to home via car with self. Discharge paperwork was discussed with the patient. LDAs were removed. Tele monitor disconnected. Pt prescriptions reviewed. Patient escorted out in wheelchair by RN.

## 2021-04-09 NOTE — DISCHARGE INSTRUCTIONS
Discharge Instructions per Aylin Watson M.D.  Take medications as prescribed   Total abstinence from alcohol   Follow up with primary care in two weeks   Follow up with referral for cardiology and gastroenterology   Avoid blood thinned at this time   Monitor Blood pressure 1-2 times a day and discuss with primary care physician     DIET: healthy, low salt     ACTIVITY: as tolerated     DIAGNOSIS: alcoholic pancreatitis, alcoholic cirrhosis, hypertension, diabetes     Return to ER if chest pain, shortness of breath, leg swelling, unusual bleeding, severe headache, focal weakness    Discharge Instructions    Discharged to home by car with self. Discharged via wheelchair, hospital escort: Yes.  Special equipment needed: Not Applicable    Be sure to schedule a follow-up appointment with your primary care doctor or any specialists as instructed.     Discharge Plan:   Diet Plan: Discussed  Activity Level: Discussed  Confirmed Follow up Appointment: Patient to Call and Schedule Appointment  Confirmed Symptoms Management: Discussed  Medication Reconciliation Updated: Yes    I understand that a diet low in cholesterol, fat, and sodium is recommended for good health. Unless I have been given specific instructions below for another diet, I accept this instruction as my diet prescription.   Other diet: heart healthy; diabetic    Special Instructions: None    · Is patient discharged on Warfarin / Coumadin?   No     Depression / Suicide Risk    As you are discharged from this [facility] facility, it is important to learn how to keep safe from harming yourself.    Recognize the warning signs:  · Abrupt changes in personality, positive or negative- including increase in energy   · Giving away possessions  · Change in eating patterns- significant weight changes-  positive or negative  · Change in sleeping patterns- unable to sleep or sleeping all the time   · Unwillingness or inability to communicate  · Depression  · Unusual  sadness, discouragement and loneliness  · Talk of wanting to die  · Neglect of personal appearance   · Rebelliousness- reckless behavior  · Withdrawal from people/activities they love  · Confusion- inability to concentrate     If you or a loved one observes any of these behaviors or has concerns about self-harm, here's what you can do:  · Talk about it- your feelings and reasons for harming yourself  · Remove any means that you might use to hurt yourself (examples: pills, rope, extension cords, firearm)  · Get professional help from the community (Mental Health, Substance Abuse, psychological counseling)  · Do not be alone:Call your Safe Contact- someone whom you trust who will be there for you.  · Call your local CRISIS HOTLINE 664-8697 or 874-512-8841  · Call your local Children's Mobile Crisis Response Team [place] (282) 946-4724 or www.Olocode  · Call the toll free National Suicide Prevention Hotlines   · National Suicide Prevention Lifeline 445-051-PWRN (3107)  · National Hope Line Network 800-SUICIDE (733-0088)      Discharge Instructions    Discharged to home by car with self. Discharged via wheelchair, hospital escort: Yes.  Special equipment needed: Not Applicable    Be sure to schedule a follow-up appointment with your primary care doctor or any specialists as instructed.     Discharge Plan:   Diet Plan: Discussed  Activity Level: Discussed  Confirmed Follow up Appointment: Patient to Call and Schedule Appointment  Confirmed Symptoms Management: Discussed  Medication Reconciliation Updated: Yes    I understand that a diet low in cholesterol, fat, and sodium is recommended for good health. Unless I have been given specific instructions below for another diet, I accept this instruction as my diet prescription.   Other diet: heart healthy; diabetic    Special Instructions: None    Is patient discharged on Warfarin / Coumadin?   No     Depression / Suicide Risk    As you are discharged from this  [facility] facility, it is important to learn how to keep safe from harming yourself.    Recognize the warning signs:  Abrupt changes in personality, positive or negative- including increase in energy   Giving away possessions  Change in eating patterns- significant weight changes-  positive or negative  Change in sleeping patterns- unable to sleep or sleeping all the time   Unwillingness or inability to communicate  Depression  Unusual sadness, discouragement and loneliness  Talk of wanting to die  Neglect of personal appearance   Rebelliousness- reckless behavior  Withdrawal from people/activities they love  Confusion- inability to concentrate     If you or a loved one observes any of these behaviors or has concerns about self-harm, here's what you can do:  Talk about it- your feelings and reasons for harming yourself  Remove any means that you might use to hurt yourself (examples: pills, rope, extension cords, firearm)  Get professional help from the community (Mental Health, Substance Abuse, psychological counseling)  Do not be alone:Call your Safe Contact- someone whom you trust who will be there for you.  Call your local CRISIS HOTLINE 560-4605 or 833-830-9612  Call your local Children's Mobile Crisis Response Team [place] (110) 569-1936 or www.Flatpebble  Call the toll free National Suicide Prevention Hotlines   National Suicide Prevention Lifeline 429-089-TEGR (4465)  National Hope Line Network 800-SUICIDE (758-4770)            Acute Pancreatitis    The pancreas is a gland that is located behind the stomach on the left side of the abdomen. It produces enzymes that help to digest food. The pancreas also releases the hormones glucagon and insulin, which help to regulate blood sugar. Acute pancreatitis happens when inflammation of the pancreas suddenly occurs and the pancreas becomes irritated and swollen.  Most acute attacks last a few days and cause serious problems. Some people become  dehydrated and develop low blood pressure. In severe cases, bleeding in the abdomen can lead to shock and can be life-threatening. The lungs, heart, and kidneys may fail.  What are the causes?  This condition may be caused by:  · Alcohol abuse.  · Drug abuse.  · Gallstones or other conditions that can block the tube that drains the pancreas (pancreatic duct).  · A tumor in the pancreas.  Other causes include:  · Certain medicines.  · Exposure to certain chemicals.  · Diabetes.  · An infection in the pancreas.  · Damage caused by an accident (trauma).  · The poison (venom) from a scorpion bite.  · Abdominal surgery.  · Autoimmune pancreatitis. This is when the body's disease-fighting (immune) system attacks the pancreas.  · Genes that are passed from parent to child (inherited).  In some cases, the cause of this condition is not known.  What are the signs or symptoms?  Symptoms of this condition include:  · Pain in the upper abdomen that may radiate to the back. Pain may be severe.  · Tenderness and swelling of the abdomen.  · Nausea and vomiting.  · Fever.  How is this diagnosed?  This condition may be diagnosed based on:  · A physical exam.  · Blood tests.  · Imaging tests, such as X-rays, CT or MRI scans, or an ultrasound of the abdomen.  How is this treated?  Treatment for this condition usually requires a stay in the hospital. Treatment for this condition may include:  · Pain medicine.  · Fluid replacement through an IV.  · Placing a tube in the stomach to remove stomach contents and to control vomiting (NG tube, or nasogastric tube).  · Not eating for 3-4 days. This gives the pancreas a rest, because enzymes are not being produced that can cause further damage.  · Antibiotic medicines, if your condition is caused by an infection.  · Treating any underlying conditions that may be the cause.  · Steroid medicines, if your condition is caused by your immune system attacking your body's own tissues (autoimmune  disease).  · Surgery on the pancreas or gallbladder.  Follow these instructions at home:  Eating and drinking    · Follow instructions from your health care provider about diet. This may involve avoiding alcohol and decreasing the amount of fat in your diet.  · Eat smaller, more frequent meals. This reduces the amount of digestive fluids that the pancreas produces.  · Drink enough fluid to keep your urine pale yellow.  · Do not drink alcohol if it caused your condition.  General instructions  · Take over-the-counter and prescription medicines only as told by your health care provider.  · Do not drive or use heavy machinery while taking prescription pain medicine.  · Ask your health care provider if the medicine prescribed to you can cause constipation. You may need to take steps to prevent or treat constipation, such as:  ? Take an over-the-counter or prescription medicine for constipation.  ? Eat foods that are high in fiber such as whole grains and beans.  ? Limit foods that are high in fat and processed sugars, such as fried or sweet foods.  · Do not use any products that contain nicotine or tobacco, such as cigarettes, e-cigarettes, and chewing tobacco. If you need help quitting, ask your health care provider.  · Get plenty of rest.  · If directed, check your blood sugar at home as told by your health care provider.  · Keep all follow-up visits as told by your health care provider. This is important.  Contact a health care provider if you:  · Do not recover as quickly as expected.  · Develop new or worsening symptoms.  · Have persistent pain, weakness, or nausea.  · Recover and then have another episode of pain.  · Have a fever.  Get help right away if:  · You cannot eat or keep fluids down.  · Your pain becomes severe.  · Your skin or the white part of your eyes turns yellow (jaundice).  · You have sudden swelling in your abdomen.  · You vomit.  · You feel dizzy or you faint.  · Your blood sugar is high (over  300 mg/dL).  Summary  · Acute pancreatitis happens when inflammation of the pancreas suddenly occurs and the pancreas becomes irritated and swollen.  · This condition is typically caused by alcohol abuse, drug abuse, or gallstones.  · Treatment for this condition usually requires a stay in the hospital.  This information is not intended to replace advice given to you by your health care provider. Make sure you discuss any questions you have with your health care provider.  Document Released: 12/18/2006 Document Revised: 10/07/2019 Document Reviewed: 06/24/2019  Elsevier Patient Education © 2020 [employer]

## 2021-04-09 NOTE — PROGRESS NOTES
Assumed care of PT A&O 4. Pt resting in bed with no signs of labored breathing. On RA. Tele monitor in place, cardiac rhythm being monitored. MD notified of pt's uncontrolled htn. Gave ordered meds at this time and will recheck bp. Call light within reach, bed in lowest position, upper bed rails up. Pt was updated on plan of care for the day. Will continue to monitor.

## 2021-04-09 NOTE — CONSULTS
Diabetes education: Met with pt this evening. Pt is newly dx with diabetes and admitted with blood sugar of 215, and Hg a1c of 9.0%. Pt also admitted with pancreatitis.  Pt is currently on regular insulin sliding scale coverage ac and hs with blood sugars of 175 ( 2 units), 145, and 221 ( 3 units).  Discussed what diabetes was, type two diabetes, effect of alcohol on blood sugars, effect of pancreatitis on blood sugars, what else effects blood sugars, goals for blood sugars, frequency if he wishes to test, hyperglycemia, hypoglycemia, metformin and need to stop drinking ( pt states already decided need to stop, up to recently he had been clean and sober for over 2 years).  Reviewed carbs and proteins, importance of exercise and stress management.  Plan: education completed. Pt has a [facility] DM book and questions answered. Please send text via Voalte if needs change.

## 2021-04-12 ENCOUNTER — PATIENT OUTREACH (OUTPATIENT)
Dept: HEALTH INFORMATION MANAGEMENT | Facility: OTHER | Age: 36
End: 2021-04-12

## 2021-04-12 LAB
BACTERIA BLD CULT: NORMAL
BACTERIA BLD CULT: NORMAL
SIGNIFICANT IND 70042: NORMAL
SIGNIFICANT IND 70042: NORMAL
SITE SITE: NORMAL
SITE SITE: NORMAL
SOURCE SOURCE: NORMAL
SOURCE SOURCE: NORMAL

## 2021-04-12 NOTE — PROGRESS NOTES
Community Health Worker Vinson called a patient in an attempt to follow up. Patient has a voicemail box that is not set up.     CHW will attempt again.

## 2021-04-12 NOTE — LETTER
April 16, 2021        Leo Medellin  [address] [place] [address]        Dear [place]:      This is the [facility] rental assistance application. Once you fill out the application, call the office at [phone] ext [phone]. They will help you pay your rent if you have past due payments.           If you have any questions or concerns, please don't hesitate to call.        Sincerely,       Bridget Saab, [facility]  Community Health Worker, Community Care Management  [address] [place] [address]  [id number]  Mailstop: Mailstop: A8  P: [phone]  F: [phone]  [email or web address]  Here to FIGHT THE GOOD FIGHT

## 2021-04-14 NOTE — PROGRESS NOTES
Community Health Worker Vinson called a patient in an attempt to follow up after recent hospitalization. Patient has a voicemail box that is not set up.     CHW will attempt again.

## 2021-04-16 NOTE — PROGRESS NOTES
Community Health Worker followed up with the patient after discharging from the hospital. Patient states that he has not received a call from [facility] yet in regards to scheduling with CRISTIAN Hamilton. CHW called the office and left a voicemail. Patient will call GI consultants and cardiology to schedule follow up appointments.  CHW advised the patient to call [facility] if he ever has any questions for a , RN, or pharmacist. CHW provided rental assistance resources as the patient states to be behind on rent.      Community Health Worker Intake  • Social determinates of health intake complete.    • Identified barriers to housing.   • Contact information provided to Leo Medellin   • Outpatient assessment completed.  • Did the patient receive medications post discharge: Yes    Plan: CHW will remove the patient from [facility] caseload.

## 2021-06-06 ENCOUNTER — HOSPITAL ENCOUNTER (EMERGENCY)
Facility: MEDICAL CENTER | Age: 36
End: 2021-06-06
Payer: MEDICAID

## 2021-06-06 VITALS
TEMPERATURE: 97.9 F | DIASTOLIC BLOOD PRESSURE: 73 MMHG | HEIGHT: 67 IN | RESPIRATION RATE: 18 BRPM | OXYGEN SATURATION: 97 % | SYSTOLIC BLOOD PRESSURE: 118 MMHG | BODY MASS INDEX: 29.76 KG/M2 | WEIGHT: 189.6 LBS | HEART RATE: 91 BPM

## 2021-06-06 PROCEDURE — 302449 STATCHG TRIAGE ONLY (STATISTIC)

## 2021-06-06 ASSESSMENT — FIBROSIS 4 INDEX: FIB4 SCORE: 0.66

## 2021-06-06 ASSESSMENT — PAIN DESCRIPTION - DESCRIPTORS: DESCRIPTORS: ACHING

## 2021-06-07 NOTE — ED TRIAGE NOTES
"Chief Complaint   Patient presents with   • Weakness     Patient recieved his second shot for COVID on 6/2. Patient has been feeling flu like symptoms: body aches, fever, and voiding frequently. Patient states he took his BP at home and states it was low with SBP 90. Pt states he is on BP medication   • Blurred Vision     Patient states his vision has been foggy, states that he sees \"vibrations\"   • Low Back Pain     Low back tenderness.    • Flu Like Symptoms     body aches, \"feels shaky on the inside\"       34 yo M to triage for above complaint. Patient received his COVID Vaccine on 6/2, pt started feeling flu like symptoms: body aches, fever, headache and chills. Pt states he has been feeling week as well. Reports some blurred vision, states his eyes have been foggy in his vision, reports to see \"vibrations.\" Pt stopped taking on his Blood pressure medications recently, because he ran out. C/O of low back pain, and \"feeling shaky on the inside.\" GCS 15.     Pt is alert and oriented, speaking in full sentences, follows commands and responds appropriately to questions. NAD. Resp are even and unlabored. VSS.     Pt placed in lobby. Pt educated on triage process. Pt encouraged to alert staff for any changes.     Patient and staff wearing appropriate PPE    /73   Pulse 91   Temp 36.6 °C (97.9 °F) (Temporal)   Resp 18   Ht 1.702 m (5' 7\")   Wt 86 kg (189 lb 9.5 oz)   SpO2 97%   BMI 29.69 kg/m²   "

## 2021-07-27 ENCOUNTER — APPOINTMENT (OUTPATIENT)
Dept: RADIOLOGY | Facility: MEDICAL CENTER | Age: 36
End: 2021-07-27
Attending: EMERGENCY MEDICINE

## 2021-07-27 ENCOUNTER — HOSPITAL ENCOUNTER (EMERGENCY)
Facility: MEDICAL CENTER | Age: 36
End: 2021-07-27
Attending: EMERGENCY MEDICINE
Payer: MEDICAID

## 2021-07-27 VITALS
SYSTOLIC BLOOD PRESSURE: 147 MMHG | HEIGHT: 67 IN | OXYGEN SATURATION: 97 % | RESPIRATION RATE: 19 BRPM | WEIGHT: 186.07 LBS | TEMPERATURE: 96.9 F | HEART RATE: 78 BPM | BODY MASS INDEX: 29.2 KG/M2 | DIASTOLIC BLOOD PRESSURE: 88 MMHG

## 2021-07-27 DIAGNOSIS — R07.9 CHEST PAIN, UNSPECIFIED TYPE: ICD-10-CM

## 2021-07-27 LAB
ALBUMIN SERPL BCP-MCNC: 4.5 G/DL (ref 3.2–4.9)
ALBUMIN/GLOB SERPL: 1.5 G/DL
ALP SERPL-CCNC: 125 U/L (ref 30–99)
ALT SERPL-CCNC: 33 U/L (ref 2–50)
ANION GAP SERPL CALC-SCNC: 9 MMOL/L (ref 7–16)
AST SERPL-CCNC: 23 U/L (ref 12–45)
BASOPHILS # BLD AUTO: 0.4 % (ref 0–1.8)
BASOPHILS # BLD: 0.05 K/UL (ref 0–0.12)
BILIRUB SERPL-MCNC: 0.3 MG/DL (ref 0.1–1.5)
BUN SERPL-MCNC: 16 MG/DL (ref 8–22)
CALCIUM SERPL-MCNC: 9.2 MG/DL (ref 8.5–10.5)
CHLORIDE SERPL-SCNC: 110 MMOL/L (ref 96–112)
CO2 SERPL-SCNC: 22 MMOL/L (ref 20–33)
CREAT SERPL-MCNC: 0.8 MG/DL (ref 0.5–1.4)
EKG IMPRESSION: NORMAL
EOSINOPHIL # BLD AUTO: 0.11 K/UL (ref 0–0.51)
EOSINOPHIL NFR BLD: 1 % (ref 0–6.9)
ERYTHROCYTE [DISTWIDTH] IN BLOOD BY AUTOMATED COUNT: 48.4 FL (ref 35.9–50)
GLOBULIN SER CALC-MCNC: 3 G/DL (ref 1.9–3.5)
GLUCOSE SERPL-MCNC: 79 MG/DL (ref 65–99)
HCT VFR BLD AUTO: 44.9 % (ref 42–52)
HGB BLD-MCNC: 15 G/DL (ref 14–18)
IMM GRANULOCYTES # BLD AUTO: 0.06 K/UL (ref 0–0.11)
IMM GRANULOCYTES NFR BLD AUTO: 0.5 % (ref 0–0.9)
LIPASE SERPL-CCNC: 89 U/L (ref 11–82)
LYMPHOCYTES # BLD AUTO: 3.5 K/UL (ref 1–4.8)
LYMPHOCYTES NFR BLD: 31.1 % (ref 22–41)
MCH RBC QN AUTO: 30.2 PG (ref 27–33)
MCHC RBC AUTO-ENTMCNC: 33.4 G/DL (ref 33.7–35.3)
MCV RBC AUTO: 90.3 FL (ref 81.4–97.8)
MONOCYTES # BLD AUTO: 0.81 K/UL (ref 0–0.85)
MONOCYTES NFR BLD AUTO: 7.2 % (ref 0–13.4)
NEUTROPHILS # BLD AUTO: 6.71 K/UL (ref 1.82–7.42)
NEUTROPHILS NFR BLD: 59.8 % (ref 44–72)
NRBC # BLD AUTO: 0 K/UL
NRBC BLD-RTO: 0 /100 WBC
PLATELET # BLD AUTO: 257 K/UL (ref 164–446)
PMV BLD AUTO: 11.9 FL (ref 9–12.9)
POTASSIUM SERPL-SCNC: 4.2 MMOL/L (ref 3.6–5.5)
PROT SERPL-MCNC: 7.5 G/DL (ref 6–8.2)
RBC # BLD AUTO: 4.97 M/UL (ref 4.7–6.1)
SODIUM SERPL-SCNC: 141 MMOL/L (ref 135–145)
TROPONIN T SERPL-MCNC: 11 NG/L (ref 6–19)
TROPONIN T SERPL-MCNC: 13 NG/L (ref 6–19)
WBC # BLD AUTO: 11.2 K/UL (ref 4.8–10.8)

## 2021-07-27 PROCEDURE — 36415 COLL VENOUS BLD VENIPUNCTURE: CPT

## 2021-07-27 PROCEDURE — 93005 ELECTROCARDIOGRAM TRACING: CPT

## 2021-07-27 PROCEDURE — 93005 ELECTROCARDIOGRAM TRACING: CPT | Performed by: EMERGENCY MEDICINE

## 2021-07-27 PROCEDURE — 84484 ASSAY OF TROPONIN QUANT: CPT | Mod: 91

## 2021-07-27 PROCEDURE — 85025 COMPLETE CBC W/AUTO DIFF WBC: CPT

## 2021-07-27 PROCEDURE — 71045 X-RAY EXAM CHEST 1 VIEW: CPT

## 2021-07-27 PROCEDURE — 80053 COMPREHEN METABOLIC PANEL: CPT

## 2021-07-27 PROCEDURE — 99284 EMERGENCY DEPT VISIT MOD MDM: CPT

## 2021-07-27 PROCEDURE — 83690 ASSAY OF LIPASE: CPT

## 2021-07-27 ASSESSMENT — HEART SCORE
HISTORY: SLIGHTLY SUSPICIOUS
ECG: NON-SPECIFIC REPOLARIZATION DISTURBANCE
RISK FACTORS: 1-2 RISK FACTORS
AGE: <45
HEART SCORE: 2
TROPONIN: LESS THAN OR EQUAL TO NORMAL LIMIT

## 2021-07-27 ASSESSMENT — FIBROSIS 4 INDEX: FIB4 SCORE: 0.66

## 2021-07-27 NOTE — ED TRIAGE NOTES
Pt ambulated to triage with   Chief Complaint   Patient presents with   • Chest Pain     started 30 mins ago, left arm weakness and pain going into neck and left shoulder,  pt was feeling SOB prior to pain,       Pt to Red 6.

## 2021-07-27 NOTE — ED PROVIDER NOTES
ED Provider Note    CHIEF COMPLAINT  Chief Complaint   Patient presents with   • Chest Pain     started 30 mins ago, left arm weakness and pain going into neck and left shoulder,  pt was feeling SOB prior to pain,         HPI  Urdu Meliton Medellin is a 35 y.o. male who presents for evaluation of chest discomfort that started around 30 minutes ago, radiates to the left arm and shoulder.  Patient has a complex medical history including alcohol induced cirrhosis pancreatitis.  He recently quit drinking.  He has no known coronary artery disease but has risk factors including prior drug and alcohol abuse.  No focal numbness weakness tingling to the arms legs or face.  Pain does not radiate to the shoulder blades.  No associated hemoptysis or leg swelling.    REVIEW OF SYSTEMS  See HPI for further details.  No high fevers chills night sweats weight loss all other systems are negative.     PAST MEDICAL HISTORY  Past Medical History:   Diagnosis Date   • Alcohol abuse    • Alcohol abuse     5-10 beers   • Alcoholic cirrhosis (HCC)    • Anxiety    • Asthma    • Depression    • ETOH abuse    • Gastritis    • Hypertension    • Liver failure (HCC)    • Pancreatitis    • Ulcer     unsure if abdominal/intestinal       FAMILY HISTORY  No reported history of early coronary artery disease  SOCIAL HISTORY  Social History     Socioeconomic History   • Marital status: Single     Spouse name: Not on file   • Number of children: Not on file   • Years of education: Not on file   • Highest education level: Not on file   Occupational History   • Not on file   Tobacco Use   • Smoking status: Former Smoker     Types: Cigarettes   • Smokeless tobacco: Never Used   Vaping Use   • Vaping Use: Every day   • Substances: THC   Substance and Sexual Activity   • Alcohol use: Yes     Comment: 4/7/2021 quit   • Drug use: Yes     Types: Inhaled     Comment: marajuana daily   • Sexual activity: Not on file   Other Topics Concern   • Not on file   Social  History Narrative    ** Merged History Encounter **         ** Merged History Encounter **          Social Determinants of Health     Financial Resource Strain: Low Risk    • Difficulty of Paying Living Expenses: Not very hard   Food Insecurity: No Food Insecurity   • Worried About Running Out of Food in the Last Year: Never true   • Ran Out of Food in the Last Year: Never true   Transportation Needs: No Transportation Needs   • Lack of Transportation (Medical): No   • Lack of Transportation (Non-Medical): No   Physical Activity:    • Days of Exercise per Week:    • Minutes of Exercise per Session:    Stress:    • Feeling of Stress :    Social Connections:    • Frequency of Communication with Friends and Family:    • Frequency of Social Gatherings with Friends and Family:    • Attends [employer] Services:    • Active Member of Clubs or Organizations:    • Attends Club or Organization Meetings:    • Marital Status:    Intimate Partner Violence:    • Fear of Current or Ex-Partner:    • Emotionally Abused:    • Physically Abused:    • Sexually Abused:        SURGICAL HISTORY  Past Surgical History:   Procedure Laterality Date   • APPENDECTOMY     • PB APPENDECTOMY         CURRENT MEDICATIONS  Home Medications     Reviewed by Sonya Olmos R.N. (Registered Nurse) on 07/27/21 at 1026  Med List Status: Partial   Medication Last Dose Status   albuterol 108 (90 Base) MCG/ACT Aero Soln inhalation aerosol  Active   amLODIPine (NORVASC) 10 MG Tab  Active   DULoxetine (CYMBALTA) 60 MG Cap DR Particles delayed-release capsule  Active   lisinopril (PRINIVIL) 40 MG tablet  Active   meclizine (ANTIVERT) 25 MG Tab  Active   metFORMIN ER (GLUCOPHAGE XR) 500 MG TABLET SR 24 HR  Active   metoprolol tartrate (LOPRESSOR) 50 MG Tab  Active   omeprazole (PRILOSEC) 20 MG delayed-release capsule  Active   traZODone (DESYREL) 50 MG Tab  Active                ALLERGIES  No Known Allergies    PHYSICAL EXAM  VITAL SIGNS: /96   Pulse 87  "  Temp 36.1 °C (96.9 °F) (Temporal)   Resp 16   Ht 1.702 m (5' 7\")   Wt 84.4 kg (186 lb 1.1 oz)   SpO2 97%   BMI 29.14 kg/m²       Constitutional: Well developed, Well nourished, No acute distress, Non-toxic appearance.   HENT: Normocephalic, Atraumatic, Bilateral external ears normal, Oropharynx moist, No oral exudates, Nose normal.   Eyes: PERRLA, EOMI, Conjunctiva normal, No discharge.   Neck: Normal range of motion, No tenderness, Supple, No stridor.   Cardiovascular: Normal heart rate, Normal rhythm, No murmurs, No rubs, No gallops.   Thorax & Lungs: Normal breath sounds, No respiratory distress, No wheezing, No chest tenderness.   Abdomen: Bowel sounds normal, Soft, No tenderness, No masses, No pulsatile masses.   Skin: Warm, Dry, No erythema, No rash.   Back: No tenderness, No CVA tenderness.   Extremities: Intact distal pulses, No edema, No tenderness, No cyanosis, No clubbing.   Neurologic: Alert & oriented x 3, Normal motor function, Normal sensory function, No focal deficits noted.   Psychiatric: Affect normal, Judgment normal, Mood normal.     Results for orders placed or performed during the hospital encounter of 07/27/21   CBC WITH DIFFERENTIAL   Result Value Ref Range    WBC 11.2 (H) 4.8 - 10.8 K/uL    RBC 4.97 4.70 - 6.10 M/uL    Hemoglobin 15.0 14.0 - 18.0 g/dL    Hematocrit 44.9 42.0 - 52.0 %    MCV 90.3 81.4 - 97.8 fL    MCH 30.2 27.0 - 33.0 pg    MCHC 33.4 (L) 33.7 - 35.3 g/dL    RDW 48.4 35.9 - 50.0 fL    Platelet Count 257 164 - 446 K/uL    MPV 11.9 9.0 - 12.9 fL    Neutrophils-Polys 59.80 44.00 - 72.00 %    Lymphocytes 31.10 22.00 - 41.00 %    Monocytes 7.20 0.00 - 13.40 %    Eosinophils 1.00 0.00 - 6.90 %    Basophils 0.40 0.00 - 1.80 %    Immature Granulocytes 0.50 0.00 - 0.90 %    Nucleated RBC 0.00 /100 WBC    Neutrophils (Absolute) 6.71 1.82 - 7.42 K/uL    Lymphs (Absolute) 3.50 1.00 - 4.80 K/uL    Monos (Absolute) 0.81 0.00 - 0.85 K/uL    Eos (Absolute) 0.11 0.00 - 0.51 K/uL    " Baso (Absolute) 0.05 0.00 - 0.12 K/uL    Immature Granulocytes (abs) 0.06 0.00 - 0.11 K/uL    NRBC (Absolute) 0.00 K/uL   Comp Metabolic Panel   Result Value Ref Range    Sodium 141 135 - 145 mmol/L    Potassium 4.2 3.6 - 5.5 mmol/L    Chloride 110 96 - 112 mmol/L    Co2 22 20 - 33 mmol/L    Anion Gap 9.0 7.0 - 16.0    Glucose 79 65 - 99 mg/dL    Bun 16 8 - 22 mg/dL    Creatinine 0.80 0.50 - 1.40 mg/dL    Calcium 9.2 8.5 - 10.5 mg/dL    AST(SGOT) 23 12 - 45 U/L    ALT(SGPT) 33 2 - 50 U/L    Alkaline Phosphatase 125 (H) 30 - 99 U/L    Total Bilirubin 0.3 0.1 - 1.5 mg/dL    Albumin 4.5 3.2 - 4.9 g/dL    Total Protein 7.5 6.0 - 8.2 g/dL    Globulin 3.0 1.9 - 3.5 g/dL    A-G Ratio 1.5 g/dL   LIPASE   Result Value Ref Range    Lipase 89 (H) 11 - 82 U/L   TROPONIN   Result Value Ref Range    Troponin T 13 6 - 19 ng/L   TROPONIN   Result Value Ref Range    Troponin T 11 6 - 19 ng/L   ESTIMATED GFR   Result Value Ref Range    GFR If African American >60 >60 mL/min/1.73 m 2    GFR If Non African American >60 >60 mL/min/1.73 m 2   EKG   Result Value Ref Range    Report       [facility]    Test Date:  2021  Pt Name:    JACKIE SMITH          Department: ER  MRN:        [id number]                      Room:  Gender:     Male                         Technician: [id number]  :        1985                   Requested By:ER TRIAGE PROTOCOL  Order #:    [id number]                    Reading MD:    Measurements  Intervals                                Axis  Rate:       76                           P:          40  CA:         128                          QRS:        80  QRSD:       74                           T:          24  QT:         372  QTc:        419    Interpretive Statements  SINUS RHYTHM  CONSIDER ANTERIOR INFARCT  ST ELEVATION SUGGESTS PERICARDITIS  Compared to ECG 2021 15:39:48  Myocardial infarct finding now present  Sinus tachycardia no longer present  T-wave  abnormality no longer present  ST (T wave) deviation still present        EKG interpretation by me rate 76.  Sinus rhythm.  There appears to be some repolarization abnormality without classic STEMI morphology in the precordial leads.  No pathological T wave inversion no ectopy  COURSE & MEDICAL DECISION MAKING  Pertinent Labs & Imaging studies reviewed. (See chart for details)  HEART score of 2  Patient presents here with chest pain present for several hours.  Initial EKG was quite concerning therefore I immediately had him placed in a bed.  Initial troponin is not elevated.  His chest pain resolved.  We watched him for several hours on telemetry he did not have any tachydysrhythmia heart block or any recurrence of chest pain.  Repeat troponin is again not elevated.  Patient does have some risk factors including diabetes and prior drug use but does not appear to be having acute coronary syndrome.  His PE RC score is 0 and his HEART score is 2 placing him in moderate to low risk.  We will refer him urgently to cardiology and I advised him to return as needed for new or worsening symptoms  FINAL IMPRESSION  1.  1. Chest pain, unspecified type  REFERRAL TO CARDIOLOGY              Electronically signed by: Dannie Gamez M.D., 7/27/2021 10:42 AM

## 2021-07-27 NOTE — ED NOTES
Pt discharged; Pt verbalized understanding of discharge education as well as signs and symptoms for which to return to the ED. Pt ambulated to the lobby with steady gait.

## 2021-08-12 ENCOUNTER — HOSPITAL ENCOUNTER (EMERGENCY)
Facility: MEDICAL CENTER | Age: 36
End: 2021-08-12
Attending: EMERGENCY MEDICINE
Payer: MEDICAID

## 2021-08-12 VITALS
HEART RATE: 86 BPM | HEIGHT: 67 IN | WEIGHT: 186.51 LBS | OXYGEN SATURATION: 96 % | SYSTOLIC BLOOD PRESSURE: 145 MMHG | TEMPERATURE: 96.5 F | RESPIRATION RATE: 16 BRPM | DIASTOLIC BLOOD PRESSURE: 97 MMHG | BODY MASS INDEX: 29.27 KG/M2

## 2021-08-12 DIAGNOSIS — R11.2 NAUSEA VOMITING AND DIARRHEA: ICD-10-CM

## 2021-08-12 DIAGNOSIS — R19.7 NAUSEA VOMITING AND DIARRHEA: ICD-10-CM

## 2021-08-12 DIAGNOSIS — R10.84 GENERALIZED ABDOMINAL PAIN: ICD-10-CM

## 2021-08-12 LAB
ALBUMIN SERPL BCP-MCNC: 4.3 G/DL (ref 3.2–4.9)
ALBUMIN/GLOB SERPL: 1.4 G/DL
ALP SERPL-CCNC: 121 U/L (ref 30–99)
ALT SERPL-CCNC: 33 U/L (ref 2–50)
ANION GAP SERPL CALC-SCNC: 9 MMOL/L (ref 7–16)
APPEARANCE UR: CLEAR
AST SERPL-CCNC: 22 U/L (ref 12–45)
BASOPHILS # BLD AUTO: 0.5 % (ref 0–1.8)
BASOPHILS # BLD: 0.06 K/UL (ref 0–0.12)
BILIRUB SERPL-MCNC: 0.2 MG/DL (ref 0.1–1.5)
BILIRUB UR QL STRIP.AUTO: NEGATIVE
BUN SERPL-MCNC: 14 MG/DL (ref 8–22)
CALCIUM SERPL-MCNC: 9.2 MG/DL (ref 8.5–10.5)
CHLORIDE SERPL-SCNC: 104 MMOL/L (ref 96–112)
CO2 SERPL-SCNC: 23 MMOL/L (ref 20–33)
COLOR UR: YELLOW
CREAT SERPL-MCNC: 0.82 MG/DL (ref 0.5–1.4)
EOSINOPHIL # BLD AUTO: 0.06 K/UL (ref 0–0.51)
EOSINOPHIL NFR BLD: 0.5 % (ref 0–6.9)
ERYTHROCYTE [DISTWIDTH] IN BLOOD BY AUTOMATED COUNT: 49.1 FL (ref 35.9–50)
GLOBULIN SER CALC-MCNC: 3.1 G/DL (ref 1.9–3.5)
GLUCOSE SERPL-MCNC: 120 MG/DL (ref 65–99)
GLUCOSE UR STRIP.AUTO-MCNC: NEGATIVE MG/DL
HCT VFR BLD AUTO: 50.1 % (ref 42–52)
HGB BLD-MCNC: 16.4 G/DL (ref 14–18)
IMM GRANULOCYTES # BLD AUTO: 0.04 K/UL (ref 0–0.11)
IMM GRANULOCYTES NFR BLD AUTO: 0.3 % (ref 0–0.9)
KETONES UR STRIP.AUTO-MCNC: NEGATIVE MG/DL
LEUKOCYTE ESTERASE UR QL STRIP.AUTO: NEGATIVE
LIPASE SERPL-CCNC: 119 U/L (ref 11–82)
LYMPHOCYTES # BLD AUTO: 2.19 K/UL (ref 1–4.8)
LYMPHOCYTES NFR BLD: 18.7 % (ref 22–41)
MCH RBC QN AUTO: 29.9 PG (ref 27–33)
MCHC RBC AUTO-ENTMCNC: 32.7 G/DL (ref 33.7–35.3)
MCV RBC AUTO: 91.4 FL (ref 81.4–97.8)
MICRO URNS: NORMAL
MONOCYTES # BLD AUTO: 0.61 K/UL (ref 0–0.85)
MONOCYTES NFR BLD AUTO: 5.2 % (ref 0–13.4)
NEUTROPHILS # BLD AUTO: 8.76 K/UL (ref 1.82–7.42)
NEUTROPHILS NFR BLD: 74.8 % (ref 44–72)
NITRITE UR QL STRIP.AUTO: NEGATIVE
NRBC # BLD AUTO: 0 K/UL
NRBC BLD-RTO: 0 /100 WBC
PH UR STRIP.AUTO: 8 [PH] (ref 5–8)
PLATELET # BLD AUTO: 283 K/UL (ref 164–446)
PMV BLD AUTO: 12.1 FL (ref 9–12.9)
POTASSIUM SERPL-SCNC: 4.1 MMOL/L (ref 3.6–5.5)
PROT SERPL-MCNC: 7.4 G/DL (ref 6–8.2)
PROT UR QL STRIP: NEGATIVE MG/DL
RBC # BLD AUTO: 5.48 M/UL (ref 4.7–6.1)
RBC UR QL AUTO: NEGATIVE
SODIUM SERPL-SCNC: 136 MMOL/L (ref 135–145)
SP GR UR STRIP.AUTO: 1.02
UROBILINOGEN UR STRIP.AUTO-MCNC: 0.2 MG/DL
WBC # BLD AUTO: 11.7 K/UL (ref 4.8–10.8)

## 2021-08-12 PROCEDURE — 99285 EMERGENCY DEPT VISIT HI MDM: CPT

## 2021-08-12 PROCEDURE — 700105 HCHG RX REV CODE 258: Performed by: EMERGENCY MEDICINE

## 2021-08-12 PROCEDURE — 700111 HCHG RX REV CODE 636 W/ 250 OVERRIDE (IP): Performed by: EMERGENCY MEDICINE

## 2021-08-12 PROCEDURE — 96374 THER/PROPH/DIAG INJ IV PUSH: CPT

## 2021-08-12 PROCEDURE — 80053 COMPREHEN METABOLIC PANEL: CPT

## 2021-08-12 PROCEDURE — 81003 URINALYSIS AUTO W/O SCOPE: CPT

## 2021-08-12 PROCEDURE — 83690 ASSAY OF LIPASE: CPT

## 2021-08-12 PROCEDURE — 85025 COMPLETE CBC W/AUTO DIFF WBC: CPT

## 2021-08-12 RX ORDER — SODIUM CHLORIDE 9 MG/ML
500 INJECTION, SOLUTION INTRAVENOUS ONCE
Status: COMPLETED | OUTPATIENT
Start: 2021-08-12 | End: 2021-08-12

## 2021-08-12 RX ORDER — ONDANSETRON 4 MG/1
4 TABLET, ORALLY DISINTEGRATING ORAL EVERY 4 HOURS PRN
Qty: 10 TABLET | Refills: 0 | Status: SHIPPED | OUTPATIENT
Start: 2021-08-12 | End: 2022-02-10

## 2021-08-12 RX ORDER — DICYCLOMINE HCL 20 MG
20 TABLET ORAL EVERY 6 HOURS PRN
Qty: 20 TABLET | Refills: 0 | Status: SHIPPED | OUTPATIENT
Start: 2021-08-12 | End: 2022-02-10

## 2021-08-12 RX ORDER — ONDANSETRON 2 MG/ML
4 INJECTION INTRAMUSCULAR; INTRAVENOUS ONCE
Status: COMPLETED | OUTPATIENT
Start: 2021-08-12 | End: 2021-08-12

## 2021-08-12 RX ADMIN — ONDANSETRON 4 MG: 2 INJECTION INTRAMUSCULAR; INTRAVENOUS at 09:32

## 2021-08-12 RX ADMIN — SODIUM CHLORIDE 500 ML: 9 INJECTION, SOLUTION INTRAVENOUS at 09:33

## 2021-08-12 ASSESSMENT — LIFESTYLE VARIABLES: DO YOU DRINK ALCOHOL: NO

## 2021-08-12 ASSESSMENT — FIBROSIS 4 INDEX: FIB4 SCORE: 0.56

## 2021-08-12 NOTE — ED PROVIDER NOTES
ED Provider Note    CHIEF COMPLAINT  Chief Complaint   Patient presents with   • Abdominal Pain   • N/V     PT reports waking up this AM with N/V x 1 and a high BS of 140.  PT was concerned due to feeling ill and came to the ED for eval.       HPI  Yi Meliton Medellin is a 35 y.o. male who presents to the emergency department through triage with significant other for generalized abdominal pain, nausea and vomiting.  Patient states he started to feel unwell, fatigued yesterday.  Chills without defined fever this morning.  Nausea with one episode of vomiting.  Nonbloody diarrhea..  Generalized abdominal pain but greatest across the upper abdomen.    Similar sick contacts, daughter with GI illness this week as well.    History of cirrhosis, pancreatitis, but no longer drinking alcohol.  Fairly well controlled with medications which she is compliant and includes such for hypertension, gastritis as well.    REVIEW OF SYSTEMS  See HPI for further details. All other systems are negative.     PAST MEDICAL HISTORY   has a past medical history of Alcohol abuse, Alcohol abuse, Alcoholic cirrhosis (HCC), Anxiety, Asthma, Depression, ETOH abuse, Gastritis, Hypertension, Liver failure (HCC), Pancreatitis, and Ulcer.    SOCIAL HISTORY  Social History     Tobacco Use   • Smoking status: Former Smoker     Types: Cigarettes   • Smokeless tobacco: Never Used   Vaping Use   • Vaping Use: Every day   • Substances: THC   Substance and Sexual Activity   • Alcohol use: Yes     Comment: 4/7/2021 quit   • Drug use: Yes     Types: Inhaled     Comment: marajuana daily   • Sexual activity: Not on file       SURGICAL HISTORY   has a past surgical history that includes appendectomy and appendectomy.    CURRENT MEDICATIONS  Home Medications    **Home medications have not yet been reviewed for this encounter**         ALLERGIES  No Known Allergies    PHYSICAL EXAM  VITAL SIGNS: /95   Pulse 67   Temp 36.5 °C (97.7 °F) (Temporal)   Resp 16   " Ht 1.702 m (5' 7\")   Wt 84.6 kg (186 lb 8.2 oz)   SpO2 99%   BMI 29.21 kg/m²   Pulse ox interpretation: I interpret this pulse ox as normal.  Constitutional: Alert in no apparent distress.  HENT: Normocephalic, atraumatic. Bilateral external ears normal, Nose normal.  Dry mucous membranes.  Eyes: Pupils are equal and reactive, Conjunctiva normal.  No icterus.  Neck: Normal range of motion, Supple.  Lymphatic: No lymphadenopathy noted.   Cardiovascular: Regular rate and rhythm, no murmurs. Distal pulses intact.  No peripheral edema.  Thorax & Lungs: Normal breath sounds.  No wheezing/rales/ronchi. No increased work of breathing, clipped speech or retractions.   Abdomen: Soft, non-distended.  Mild generalized discomfort, greatest across the upper abdomen, epigastric region without rebound, guarding or peritonitis.  Skin: Warm, Dry, No erythema, No rash.   Musculoskeletal: Good range of motion in all major joints.  Neurologic: Alert and orient x4.  Ambulates independently.  Psychiatric: Affect normal, Judgment normal, Mood normal.       DIAGNOSTIC STUDIES / PROCEDURES      LABS  Results for orders placed or performed during the hospital encounter of 08/12/21   CBC WITH DIFFERENTIAL   Result Value Ref Range    WBC 11.7 (H) 4.8 - 10.8 K/uL    RBC 5.48 4.70 - 6.10 M/uL    Hemoglobin 16.4 14.0 - 18.0 g/dL    Hematocrit 50.1 42.0 - 52.0 %    MCV 91.4 81.4 - 97.8 fL    MCH 29.9 27.0 - 33.0 pg    MCHC 32.7 (L) 33.7 - 35.3 g/dL    RDW 49.1 35.9 - 50.0 fL    Platelet Count 283 164 - 446 K/uL    MPV 12.1 9.0 - 12.9 fL    Neutrophils-Polys 74.80 (H) 44.00 - 72.00 %    Lymphocytes 18.70 (L) 22.00 - 41.00 %    Monocytes 5.20 0.00 - 13.40 %    Eosinophils 0.50 0.00 - 6.90 %    Basophils 0.50 0.00 - 1.80 %    Immature Granulocytes 0.30 0.00 - 0.90 %    Nucleated RBC 0.00 /100 WBC    Neutrophils (Absolute) 8.76 (H) 1.82 - 7.42 K/uL    Lymphs (Absolute) 2.19 1.00 - 4.80 K/uL    Monos (Absolute) 0.61 0.00 - 0.85 K/uL    Eos " (Absolute) 0.06 0.00 - 0.51 K/uL    Baso (Absolute) 0.06 0.00 - 0.12 K/uL    Immature Granulocytes (abs) 0.04 0.00 - 0.11 K/uL    NRBC (Absolute) 0.00 K/uL   COMP METABOLIC PANEL   Result Value Ref Range    Sodium 136 135 - 145 mmol/L    Potassium 4.1 3.6 - 5.5 mmol/L    Chloride 104 96 - 112 mmol/L    Co2 23 20 - 33 mmol/L    Anion Gap 9.0 7.0 - 16.0    Glucose 120 (H) 65 - 99 mg/dL    Bun 14 8 - 22 mg/dL    Creatinine 0.82 0.50 - 1.40 mg/dL    Calcium 9.2 8.5 - 10.5 mg/dL    AST(SGOT) 22 12 - 45 U/L    ALT(SGPT) 33 2 - 50 U/L    Alkaline Phosphatase 121 (H) 30 - 99 U/L    Total Bilirubin 0.2 0.1 - 1.5 mg/dL    Albumin 4.3 3.2 - 4.9 g/dL    Total Protein 7.4 6.0 - 8.2 g/dL    Globulin 3.1 1.9 - 3.5 g/dL    A-G Ratio 1.4 g/dL   LIPASE   Result Value Ref Range    Lipase 119 (H) 11 - 82 U/L   URINALYSIS    Specimen: Urine   Result Value Ref Range    Color Yellow     Character Clear     Specific Gravity 1.021 <1.035    Ph 8.0 5.0 - 8.0    Glucose Negative Negative mg/dL    Ketones Negative Negative mg/dL    Protein Negative Negative mg/dL    Bilirubin Negative Negative    Urobilinogen, Urine 0.2 Negative    Nitrite Negative Negative    Leukocyte Esterase Negative Negative    Occult Blood Negative Negative    Micro Urine Req see below    ESTIMATED GFR   Result Value Ref Range    GFR If African American >60 >60 mL/min/1.73 m 2    GFR If Non African American >60 >60 mL/min/1.73 m 2       COURSE & MEDICAL DECISION MAKING  Nursing notes and vital signs were reviewed. (See chart for details)  The patients records were reviewed, history was obtained from the patient and a family member;    ED evaluation for abdominal pain, nausea, vomiting and diarrhea is unrevealing, but most suggestive of viral gastrointestinal illness, especially given similar symptoms in close contact.  Patient does have history for gastritis, peptic ulcer disease and pancreatitis.  Labs demonstrate slightly elevated lipase with otherwise normal liver  function and electrolytes.  Nonspecific and minimal elevation with leukocytosis 11.7, leftward shift without bandemia.  Both could be exacerbated by the viral illness.  However symptoms are controlled with IV fluid for clinical dehydration, Zofran for nausea or vomiting the emergency department.  He tolerates oral fluids or after without recurrent symptoms.  Abdominal exam is benign.  Vital signs are stable without fever, tachycardia or hypotension.  Will trial discharge home with symptomatic treatment, strict return instructions have been detailed as well.    Patient is stable for discharge at this time, anticipatory guidance provided, Zofran for nausea or vomiting, Bentyl for abdominal pain or cramping, close follow-up is encouraged, and strict ED return instructions have been detailed. Patient is agreeable to the disposition and plan.    Patient's blood pressure was elevated in the emergency department, and has been referred to primary care for close monitoring.    FINAL IMPRESSION  (R10.84) Generalized abdominal pain  (R11.2,  R19.7) Nausea vomiting and diarrhea      Electronically signed by: Dori Abraham D.O., 8/12/2021 12:32 PM      This dictation was created using voice recognition software. The accuracy of the dictation is limited to the abilities of the software. I expect there may be some errors of grammar and possibly content. The nursing notes were reviewed and certain aspects of this information were incorporated into this note.

## 2021-08-12 NOTE — ED NOTES
Pt ambulated to red 4.  Agree with triage note.  Pt changing into gown.  Pt placed on monitors.  ERP to see.

## 2021-08-12 NOTE — ED TRIAGE NOTES
"Chief Complaint   Patient presents with   • Abdominal Pain   • N/V     PT reports waking up this AM with N/V x 1 and a high BS of 140.  PT was concerned due to feeling ill and came to the ED for eval.     Blood Pressure 156/111   Pulse (Abnormal) 105   Temperature 36.5 °C (97.7 °F) (Temporal)   Respiration 14   Height 1.702 m (5' 7\")   Weight 84.6 kg (186 lb 8.2 oz)   Oxygen Saturation 98%   Body Mass Index 29.21 kg/m²     "

## 2021-09-28 ENCOUNTER — APPOINTMENT (OUTPATIENT)
Dept: RADIOLOGY | Facility: MEDICAL CENTER | Age: 36
End: 2021-09-28
Attending: EMERGENCY MEDICINE

## 2021-09-28 ENCOUNTER — HOSPITAL ENCOUNTER (EMERGENCY)
Facility: MEDICAL CENTER | Age: 36
End: 2021-09-28
Attending: EMERGENCY MEDICINE
Payer: MEDICAID

## 2021-09-28 VITALS
RESPIRATION RATE: 18 BRPM | BODY MASS INDEX: 29.19 KG/M2 | WEIGHT: 186 LBS | OXYGEN SATURATION: 97 % | HEIGHT: 67 IN | SYSTOLIC BLOOD PRESSURE: 129 MMHG | HEART RATE: 97 BPM | DIASTOLIC BLOOD PRESSURE: 92 MMHG | TEMPERATURE: 97.2 F

## 2021-09-28 DIAGNOSIS — S20.212A CONTUSION OF LEFT CHEST WALL, INITIAL ENCOUNTER: ICD-10-CM

## 2021-09-28 DIAGNOSIS — S09.90XA CLOSED HEAD INJURY, INITIAL ENCOUNTER: ICD-10-CM

## 2021-09-28 DIAGNOSIS — S00.03XA CONTUSION OF SCALP, INITIAL ENCOUNTER: ICD-10-CM

## 2021-09-28 DIAGNOSIS — S00.511A ABRASION OF LIP, INITIAL ENCOUNTER: ICD-10-CM

## 2021-09-28 PROCEDURE — 99284 EMERGENCY DEPT VISIT MOD MDM: CPT

## 2021-09-28 PROCEDURE — 700111 HCHG RX REV CODE 636 W/ 250 OVERRIDE (IP): Performed by: EMERGENCY MEDICINE

## 2021-09-28 PROCEDURE — 71101 X-RAY EXAM UNILAT RIBS/CHEST: CPT | Mod: LT

## 2021-09-28 PROCEDURE — 96374 THER/PROPH/DIAG INJ IV PUSH: CPT

## 2021-09-28 RX ORDER — NAPROXEN 500 MG/1
500 TABLET ORAL 2 TIMES DAILY WITH MEALS
Qty: 20 TABLET | Refills: 0 | Status: SHIPPED | OUTPATIENT
Start: 2021-09-28 | End: 2022-02-10

## 2021-09-28 RX ORDER — KETOROLAC TROMETHAMINE 30 MG/ML
30 INJECTION, SOLUTION INTRAMUSCULAR; INTRAVENOUS ONCE
Status: COMPLETED | OUTPATIENT
Start: 2021-09-28 | End: 2021-09-28

## 2021-09-28 RX ADMIN — KETOROLAC TROMETHAMINE 30 MG: 30 INJECTION, SOLUTION INTRAMUSCULAR at 14:56

## 2021-09-28 ASSESSMENT — LIFESTYLE VARIABLES: DO YOU DRINK ALCOHOL: NO

## 2021-09-28 ASSESSMENT — FIBROSIS 4 INDEX: FIB4 SCORE: 0.49
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
## 2021-09-28 NOTE — DISCHARGE INSTRUCTIONS
Your contusions and abrasions, no signs of fracture or head injury.  You are being prescribed a pain medication to assist with discomfort.  Activity as tolerated follow-up with any change or worsening symptoms

## 2021-09-28 NOTE — ED NOTES
Pt to and from imaging in stable condition. Requesting water, informed we needed imaging back before we gave him any water.
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
## 2021-09-28 NOTE — ED NOTES
Reviewed discharge instructions, pt verbalized understanding of instructions and medication. States he will schedule follow-up appointment as needed. Denies further questions at this time. Pt ambulatory out of ER with steady gait.

## 2021-11-15 ENCOUNTER — APPOINTMENT (OUTPATIENT)
Dept: RADIOLOGY | Facility: MEDICAL CENTER | Age: 36
End: 2021-11-15
Attending: EMERGENCY MEDICINE

## 2021-11-15 ENCOUNTER — HOSPITAL ENCOUNTER (EMERGENCY)
Facility: MEDICAL CENTER | Age: 36
End: 2021-11-15
Attending: EMERGENCY MEDICINE
Payer: MEDICAID

## 2021-11-15 VITALS
DIASTOLIC BLOOD PRESSURE: 90 MMHG | BODY MASS INDEX: 31.11 KG/M2 | RESPIRATION RATE: 18 BRPM | SYSTOLIC BLOOD PRESSURE: 130 MMHG | HEART RATE: 80 BPM | TEMPERATURE: 97.5 F | WEIGHT: 198.19 LBS | OXYGEN SATURATION: 96 % | HEIGHT: 67 IN

## 2021-11-15 DIAGNOSIS — I10 HYPERTENSION, UNSPECIFIED TYPE: ICD-10-CM

## 2021-11-15 DIAGNOSIS — R42 DIZZINESS: ICD-10-CM

## 2021-11-15 LAB
ALBUMIN SERPL BCP-MCNC: 4.1 G/DL (ref 3.2–4.9)
ALBUMIN/GLOB SERPL: 1.4 G/DL
ALP SERPL-CCNC: 128 U/L (ref 30–99)
ALT SERPL-CCNC: 34 U/L (ref 2–50)
ANION GAP SERPL CALC-SCNC: 10 MMOL/L (ref 7–16)
AST SERPL-CCNC: 24 U/L (ref 12–45)
BASOPHILS # BLD AUTO: 0.5 % (ref 0–1.8)
BASOPHILS # BLD: 0.05 K/UL (ref 0–0.12)
BILIRUB SERPL-MCNC: 0.4 MG/DL (ref 0.1–1.5)
BUN SERPL-MCNC: 10 MG/DL (ref 8–22)
CALCIUM SERPL-MCNC: 8.9 MG/DL (ref 8.5–10.5)
CHLORIDE SERPL-SCNC: 105 MMOL/L (ref 96–112)
CO2 SERPL-SCNC: 23 MMOL/L (ref 20–33)
CREAT SERPL-MCNC: 0.72 MG/DL (ref 0.5–1.4)
EKG IMPRESSION: NORMAL
EOSINOPHIL # BLD AUTO: 0.16 K/UL (ref 0–0.51)
EOSINOPHIL NFR BLD: 1.5 % (ref 0–6.9)
ERYTHROCYTE [DISTWIDTH] IN BLOOD BY AUTOMATED COUNT: 47.9 FL (ref 35.9–50)
GLOBULIN SER CALC-MCNC: 3 G/DL (ref 1.9–3.5)
GLUCOSE SERPL-MCNC: 126 MG/DL (ref 65–99)
HCT VFR BLD AUTO: 45.4 % (ref 42–52)
HGB BLD-MCNC: 14.9 G/DL (ref 14–18)
IMM GRANULOCYTES # BLD AUTO: 0.07 K/UL (ref 0–0.11)
IMM GRANULOCYTES NFR BLD AUTO: 0.6 % (ref 0–0.9)
LYMPHOCYTES # BLD AUTO: 3.18 K/UL (ref 1–4.8)
LYMPHOCYTES NFR BLD: 29.1 % (ref 22–41)
MCH RBC QN AUTO: 30 PG (ref 27–33)
MCHC RBC AUTO-ENTMCNC: 32.8 G/DL (ref 33.7–35.3)
MCV RBC AUTO: 91.3 FL (ref 81.4–97.8)
MONOCYTES # BLD AUTO: 0.73 K/UL (ref 0–0.85)
MONOCYTES NFR BLD AUTO: 6.7 % (ref 0–13.4)
NEUTROPHILS # BLD AUTO: 6.72 K/UL (ref 1.82–7.42)
NEUTROPHILS NFR BLD: 61.6 % (ref 44–72)
NRBC # BLD AUTO: 0 K/UL
NRBC BLD-RTO: 0 /100 WBC
PLATELET # BLD AUTO: 260 K/UL (ref 164–446)
PMV BLD AUTO: 11 FL (ref 9–12.9)
POTASSIUM SERPL-SCNC: 4.3 MMOL/L (ref 3.6–5.5)
PROT SERPL-MCNC: 7.1 G/DL (ref 6–8.2)
RBC # BLD AUTO: 4.97 M/UL (ref 4.7–6.1)
SODIUM SERPL-SCNC: 138 MMOL/L (ref 135–145)
TROPONIN T SERPL-MCNC: <6 NG/L (ref 6–19)
WBC # BLD AUTO: 10.9 K/UL (ref 4.8–10.8)

## 2021-11-15 PROCEDURE — 85025 COMPLETE CBC W/AUTO DIFF WBC: CPT

## 2021-11-15 PROCEDURE — 99284 EMERGENCY DEPT VISIT MOD MDM: CPT

## 2021-11-15 PROCEDURE — 71045 X-RAY EXAM CHEST 1 VIEW: CPT

## 2021-11-15 PROCEDURE — 93005 ELECTROCARDIOGRAM TRACING: CPT | Performed by: EMERGENCY MEDICINE

## 2021-11-15 PROCEDURE — 70450 CT HEAD/BRAIN W/O DYE: CPT

## 2021-11-15 PROCEDURE — 84484 ASSAY OF TROPONIN QUANT: CPT

## 2021-11-15 PROCEDURE — 80053 COMPREHEN METABOLIC PANEL: CPT

## 2021-11-15 ASSESSMENT — FIBROSIS 4 INDEX: FIB4 SCORE: 0.49

## 2021-11-15 NOTE — ED TRIAGE NOTES
"Chief Complaint   Patient presents with   • Dizziness     Pt states he got up to go to the bathroom \"a little while ago and I felt hungover, like kind of dizzy.\" Pt states \"I just don't feel right.\" Denies LOC.   • Hypertension     Pt states after above event he check his BP and it was \"high\". Pt endorses history of HTN and noncompliance with prescribed BP medications.       Ambulatory to triage for above complaint.   Educated on triage process, encourage to inform staff of any changes.     BP (!) 167/106   Pulse 99   Temp 36.4 °C (97.5 °F) (Oral)   Resp 18   Ht 1.702 m (5' 7\")   Wt 89.9 kg (198 lb 3.1 oz)   SpO2 100% Comment: Room air  BMI 31.04 kg/m²     "

## 2021-11-15 NOTE — ED PROVIDER NOTES
"ED Provider Note    Scribed for Fredi Dior M.D. by Alix Menjivar. 11/15/2021, 6:19 AM.    Primary care provider: CRISTIAN Hamilton  Means of arrival: Walk in  History obtained from: Patient  History limited by: None    CHIEF COMPLAINT  Chief Complaint   Patient presents with   • Dizziness     Pt states he got up to go to the bathroom \"a little while ago and I felt hungover, like kind of dizzy.\" Pt states \"I just don't feel right.\" Denies LOC.   • Hypertension     Pt states after above event he check his BP and it was \"high\". Pt endorses history of HTN and noncompliance with prescribed BP medications.       ARACELY Medellin is a 35 y.o. male who presents to the Emergency Department for evaluation of dizziness onset this morning. Patient felt dizzy upon waking up to use the restroom this morning. Patient adds that his blood pressure was elevated when he checked this morning. Patient is on blood pressure medication but is not routinely compliant. He admits to associated symptoms of intermittent bilateral blurred vision, \"hangover-like\" headache, shortness of breath when talking, mid-back soreness but denies chest pain, fever, cough, dysuria, abdominal pain, extremity weakness. Patient has not been taking his prescribed diabetic medication because he ran out. Patient believes he has been staying hydrated. No alleviating factors were reported.       REVIEW OF SYSTEMS  Pertinent positives include dizziness, blurred vision, headache, shortness of breath, mid-back soreness. Pertinent negatives include no chest pain, fever,cough, dysuria, abdominal pain, extremity weakness.  All other systems reviewed and negative.    PAST MEDICAL HISTORY   has a past medical history of Alcohol abuse, Alcohol abuse, Alcoholic cirrhosis (HCC), Anxiety, Asthma, Depression, ETOH abuse, Gastritis, Hypertension, Liver failure (HCC), Pancreatitis, and Ulcer.    SURGICAL HISTORY   has a past surgical history that includes " "appendectomy and appendectomy.    SOCIAL HISTORY  Social History     Tobacco Use   • Smoking status: Former Smoker     Types: Cigarettes   • Smokeless tobacco: Never Used   Vaping Use   • Vaping Use: Every day   • Substances: THC   Substance Use Topics   • Alcohol use: Yes     Comment: 4/7/2021 quit   • Drug use: Yes     Types: Inhaled     Comment: lianne daily      Social History     Substance and Sexual Activity   Drug Use Yes   • Types: Inhaled    [place] [employer] [place]       FAMILY HISTORY  Family History   Problem Relation Age of Onset   • Diabetes Brother    • Hypertension Mother        CURRENT MEDICATIONS  Home Medications     Reviewed by Isabella Schwartz R.N. (Registered Nurse) on 11/15/21 at 0604  Med List Status: Not Addressed   Medication Last Dose Status   albuterol 108 (90 Base) MCG/ACT Aero Soln inhalation aerosol  Active   amLODIPine (NORVASC) 10 MG Tab  Active   dicyclomine (BENTYL) 20 MG Tab  Active   DULoxetine (CYMBALTA) 60 MG Cap DR Particles delayed-release capsule  Active   lisinopril (PRINIVIL) 40 MG tablet  Active   meclizine (ANTIVERT) 25 MG Tab  Active   metFORMIN ER (GLUCOPHAGE XR) 500 MG TABLET SR 24 HR  Active   metoprolol tartrate (LOPRESSOR) 50 MG Tab  Active   naproxen (NAPROSYN) 500 MG Tab  Active   omeprazole (PRILOSEC) 20 MG delayed-release capsule  Active   ondansetron (ZOFRAN ODT) 4 MG TABLET DISPERSIBLE  Active   traZODone (DESYREL) 50 MG Tab  Active                ALLERGIES  No Known Allergies    PHYSICAL EXAM  VITAL SIGNS: BP (!) 167/106   Pulse 99   Temp 36.4 °C (97.5 °F) (Oral)   Resp 18   Ht 1.702 m (5' 7\")   Wt 89.9 kg (198 lb 3.1 oz)   SpO2 100% Comment: Room air  BMI 31.04 kg/m²     Constitutional: Well developed, Well nourished, No acute distress, Non-toxic appearance.   HENT: Normocephalic, Atraumatic, mucous membranes moist, no erythema, exudates, swelling, or masses, nares patent  Eyes: nonicteric  Neck: Supple, no meningismus  Lymphatic: No " lymphadenopathy noted.   Cardiovascular: Regular rate and rhythm, no gallops rubs or murmurs  Lungs: Clear bilaterally   Abdomen: Bowel sounds normal, Soft, No tenderness  Skin: Warm, Dry, no rash  Back: No CVA tenderness.   Extremities: No edema  Neurologic: Alert, appropriate, follows commands, moving all extremities, normal speech, no drift, no hemineglect, visual fields intact, cranial nerves grossly intact, finger and nose intact  Psychiatric: Affect normal      DIAGNOSTIC STUDIES / PROCEDURES    LABS  Results for orders placed or performed during the hospital encounter of 11/15/21   COMP METABOLIC PANEL   Result Value Ref Range    Sodium 138 135 - 145 mmol/L    Potassium 4.3 3.6 - 5.5 mmol/L    Chloride 105 96 - 112 mmol/L    Co2 23 20 - 33 mmol/L    Anion Gap 10.0 7.0 - 16.0    Glucose 126 (H) 65 - 99 mg/dL    Bun 10 8 - 22 mg/dL    Creatinine 0.72 0.50 - 1.40 mg/dL    Calcium 8.9 8.5 - 10.5 mg/dL    AST(SGOT) 24 12 - 45 U/L    ALT(SGPT) 34 2 - 50 U/L    Alkaline Phosphatase 128 (H) 30 - 99 U/L    Total Bilirubin 0.4 0.1 - 1.5 mg/dL    Albumin 4.1 3.2 - 4.9 g/dL    Total Protein 7.1 6.0 - 8.2 g/dL    Globulin 3.0 1.9 - 3.5 g/dL    A-G Ratio 1.4 g/dL   TROPONIN   Result Value Ref Range    Troponin T <6 6 - 19 ng/L   CBC WITH DIFFERENTIAL   Result Value Ref Range    WBC 10.9 (H) 4.8 - 10.8 K/uL    RBC 4.97 4.70 - 6.10 M/uL    Hemoglobin 14.9 14.0 - 18.0 g/dL    Hematocrit 45.4 42.0 - 52.0 %    MCV 91.3 81.4 - 97.8 fL    MCH 30.0 27.0 - 33.0 pg    MCHC 32.8 (L) 33.7 - 35.3 g/dL    RDW 47.9 35.9 - 50.0 fL    Platelet Count 260 164 - 446 K/uL    MPV 11.0 9.0 - 12.9 fL    Neutrophils-Polys 61.60 44.00 - 72.00 %    Lymphocytes 29.10 22.00 - 41.00 %    Monocytes 6.70 0.00 - 13.40 %    Eosinophils 1.50 0.00 - 6.90 %    Basophils 0.50 0.00 - 1.80 %    Immature Granulocytes 0.60 0.00 - 0.90 %    Nucleated RBC 0.00 /100 WBC    Neutrophils (Absolute) 6.72 1.82 - 7.42 K/uL    Lymphs (Absolute) 3.18 1.00 - 4.80 K/uL     Monos (Absolute) 0.73 0.00 - 0.85 K/uL    Eos (Absolute) 0.16 0.00 - 0.51 K/uL    Baso (Absolute) 0.05 0.00 - 0.12 K/uL    Immature Granulocytes (abs) 0.07 0.00 - 0.11 K/uL    NRBC (Absolute) 0.00 K/uL   ESTIMATED GFR   Result Value Ref Range    GFR If African American >60 >60 mL/min/1.73 m 2    GFR If Non African American >60 >60 mL/min/1.73 m 2   EKG (NOW)   Result Value Ref Range    Report       [facility] Emergency Dept.    Test Date:  2021-11-15  Pt Name:    JACKIE SMITH          Department: ER  MRN:        [id number]                      Room:       [facility]  Gender:     Male                         Technician: [id number]  :        1985                   Requested By:LAKISHA MESSER  Order #:    [id number]                    Reading MD:    Measurements  Intervals                                Axis  Rate:       79                           P:          18  ID:         [id number]                          QRS:        63  QRSD:       84                           T:          39  QT:         381  QTc:        437    Interpretive Statements  Sinus rhythm  Borderline short ID interval  ST elev, probable normal early repol pattern  Compared to ECG 2021 10:11:02  Myocardial infarct finding no longer present  ST (T wave) deviation still present         All labs reviewed by me.    EKG  Obtained at 6:35 AM   Normal Sinus rhythm   Rate 79  P intervals slightly shortened  QT interval normal  Benign-appearing J point elevation in anterior leads and is old  Resolution of ST T change in inferior leads  Axis normal   No other ST segment elevation or depression.        RADIOLOGY  CT-HEAD W/O   Final Result         1.  No acute intracranial abnormality is identified, there are nonspecific white matter changes, commonly associated with small vessel ischemic disease.  Associated mild cerebral atrophy is noted.      DX-CHEST-PORTABLE (1 VIEW)   Final Result         1.  No acute cardiopulmonary disease.        The  "radiologist's interpretation of all radiological studies have been reviewed by me.    COURSE & MEDICAL DECISION MAKING  Nursing notes, VS, PMSFHx reviewed in chart.     6:19 AM Patient seen and examined at bedside. Patient states he is noncompliant with his blood pressure and diabetic medications. He now presents with dizziness. Ordered for CT-Head, DX-Chest, CBC with diff, CMP, troponin, EKG to evaluate. Differential diagnoses include but are not limited to stroke, dehydration, acute MI, symptomatic hypertension, medication noncompliance    6:31 PM Ordered estimated GFR to evaluate patient.     7:46 AM - I reevaluated the patient at bedside. I walked patient to evaluate symptoms. Patient walks with a steady gait. I discussed the patient's diagnostic study results which show acute abnormalities. Patient should follow up with his regular doctor and should return with any concerns. I will refill patient's prescriptions. I discussed plan for discharge and follow up as outlined below. The patient verbalizes they feel comfortable going home. The patient is stable for discharge at this time and will return for any new or worsening symptoms. Patient verbalizes understanding and support with my plan for discharge.     Decision Making:  This is a 35 y.o. year old male who presents with a vague dizziness sensation with a mild headache that feels \"like a hangover\".  The patient has no focal neurologic deficits on exam and has a steady gait.  He was complaining of some mild low back pain.  His physical exam is reassuring.  EKG demonstrates some benign-appearing J-point elevation which is old and his troponin is negative.  Head CT demonstrates no acute bleed-there are some white matter changes that could be chronic ischemic change.  This was discussed with him.  Otherwise I see no evidence of acute stroke.  He does not appear to have vertigo.  I do not see any evidence of electrolyte derangement.  The patient will be treated " supportively and discharged to follow-up with his primary care doctor.  He has been noncompliant with his medications which he will fill today.     The patient will return for new or worsening symptoms and is stable at the time of discharge.    The patient is referred to a primary physician for blood pressure management, diabetic screening, and for all other preventative health concerns.    DISPOSITION:  Patient will be discharged home in stable condition.    FOLLOW UP:  CRISTIAN Hamilton  [address] [facility] [address]  [phone]    Schedule an appointment as soon as possible for a visit today        OUTPATIENT MEDICATIONS:  New Prescriptions    No medications on file         FINAL IMPRESSION  1. Dizziness    2. Hypertension, unspecified type          I, Alix Menjivar (Elizabeth), am scribing for, and in the presence of, Fredi Dior M.D..    Electronically signed by: Alix Elizabeth), 11/15/2021    IFredi M.D. personally performed the services described in this documentation, as scribed by Alix Menjivar in my presence, and it is both accurate and complete. C    The note accurately reflects work and decisions made by me.  Fredi Dior M.D.  11/15/2021  7:54 AM

## 2021-11-15 NOTE — ED NOTES
Reviewed discharge instructions with patient. Verbalized understanding. Patient leaving ER in stable condition.

## 2021-11-15 NOTE — DISCHARGE INSTRUCTIONS
Please fill your regular medications for diabetes and blood pressure.  Follow-up with your regular doctor.  Return for new or concerning symptoms.

## 2021-12-07 ENCOUNTER — APPOINTMENT (OUTPATIENT)
Dept: RADIOLOGY | Facility: MEDICAL CENTER | Age: 36
End: 2021-12-07
Attending: EMERGENCY MEDICINE

## 2021-12-07 ENCOUNTER — HOSPITAL ENCOUNTER (EMERGENCY)
Facility: MEDICAL CENTER | Age: 36
End: 2021-12-07
Attending: EMERGENCY MEDICINE
Payer: MEDICAID

## 2021-12-07 VITALS
OXYGEN SATURATION: 95 % | SYSTOLIC BLOOD PRESSURE: 134 MMHG | WEIGHT: 196.21 LBS | HEART RATE: 84 BPM | DIASTOLIC BLOOD PRESSURE: 77 MMHG | BODY MASS INDEX: 30.8 KG/M2 | HEIGHT: 67 IN | RESPIRATION RATE: 19 BRPM | TEMPERATURE: 98 F

## 2021-12-07 DIAGNOSIS — R10.9 ABDOMINAL PAIN, UNSPECIFIED ABDOMINAL LOCATION: ICD-10-CM

## 2021-12-07 DIAGNOSIS — R19.7 DIARRHEA, UNSPECIFIED TYPE: ICD-10-CM

## 2021-12-07 DIAGNOSIS — R11.2 NAUSEA AND VOMITING, INTRACTABILITY OF VOMITING NOT SPECIFIED, UNSPECIFIED VOMITING TYPE: ICD-10-CM

## 2021-12-07 LAB
ALBUMIN SERPL BCP-MCNC: 4.5 G/DL (ref 3.2–4.9)
ALBUMIN/GLOB SERPL: 1.9 G/DL
ALP SERPL-CCNC: 124 U/L (ref 30–99)
ALT SERPL-CCNC: 25 U/L (ref 2–50)
ANION GAP SERPL CALC-SCNC: 13 MMOL/L (ref 7–16)
APPEARANCE UR: CLEAR
AST SERPL-CCNC: 16 U/L (ref 12–45)
BASOPHILS # BLD AUTO: 0.5 % (ref 0–1.8)
BASOPHILS # BLD: 0.05 K/UL (ref 0–0.12)
BILIRUB SERPL-MCNC: 0.2 MG/DL (ref 0.1–1.5)
BILIRUB UR QL STRIP.AUTO: NEGATIVE
BUN SERPL-MCNC: 15 MG/DL (ref 8–22)
CALCIUM SERPL-MCNC: 9.2 MG/DL (ref 8.5–10.5)
CHLORIDE SERPL-SCNC: 105 MMOL/L (ref 96–112)
CO2 SERPL-SCNC: 22 MMOL/L (ref 20–33)
COLOR UR: YELLOW
CREAT SERPL-MCNC: 0.79 MG/DL (ref 0.5–1.4)
EOSINOPHIL # BLD AUTO: 0.11 K/UL (ref 0–0.51)
EOSINOPHIL NFR BLD: 1.1 % (ref 0–6.9)
ERYTHROCYTE [DISTWIDTH] IN BLOOD BY AUTOMATED COUNT: 46.5 FL (ref 35.9–50)
GLOBULIN SER CALC-MCNC: 2.4 G/DL (ref 1.9–3.5)
GLUCOSE SERPL-MCNC: 137 MG/DL (ref 65–99)
GLUCOSE UR STRIP.AUTO-MCNC: NEGATIVE MG/DL
HCT VFR BLD AUTO: 46.8 % (ref 42–52)
HGB BLD-MCNC: 15.5 G/DL (ref 14–18)
IMM GRANULOCYTES # BLD AUTO: 0.05 K/UL (ref 0–0.11)
IMM GRANULOCYTES NFR BLD AUTO: 0.5 % (ref 0–0.9)
KETONES UR STRIP.AUTO-MCNC: NEGATIVE MG/DL
LACTATE BLD-SCNC: 1.1 MMOL/L (ref 0.5–2)
LACTATE BLD-SCNC: 2 MMOL/L (ref 0.5–2)
LEUKOCYTE ESTERASE UR QL STRIP.AUTO: NEGATIVE
LIPASE SERPL-CCNC: 48 U/L (ref 11–82)
LYMPHOCYTES # BLD AUTO: 3.17 K/UL (ref 1–4.8)
LYMPHOCYTES NFR BLD: 31.4 % (ref 22–41)
MCH RBC QN AUTO: 30.2 PG (ref 27–33)
MCHC RBC AUTO-ENTMCNC: 33.1 G/DL (ref 33.7–35.3)
MCV RBC AUTO: 91.2 FL (ref 81.4–97.8)
MICRO URNS: ABNORMAL
MONOCYTES # BLD AUTO: 0.61 K/UL (ref 0–0.85)
MONOCYTES NFR BLD AUTO: 6 % (ref 0–13.4)
NEUTROPHILS # BLD AUTO: 6.12 K/UL (ref 1.82–7.42)
NEUTROPHILS NFR BLD: 60.5 % (ref 44–72)
NITRITE UR QL STRIP.AUTO: NEGATIVE
NRBC # BLD AUTO: 0 K/UL
NRBC BLD-RTO: 0 /100 WBC
PH UR STRIP.AUTO: 7.5 [PH] (ref 5–8)
PLATELET # BLD AUTO: 293 K/UL (ref 164–446)
PMV BLD AUTO: 11.8 FL (ref 9–12.9)
POTASSIUM SERPL-SCNC: 4.4 MMOL/L (ref 3.6–5.5)
PROT SERPL-MCNC: 6.9 G/DL (ref 6–8.2)
PROT UR QL STRIP: NEGATIVE MG/DL
RBC # BLD AUTO: 5.13 M/UL (ref 4.7–6.1)
RBC UR QL AUTO: NEGATIVE
SODIUM SERPL-SCNC: 140 MMOL/L (ref 135–145)
SP GR UR STRIP.AUTO: >=1.045
UROBILINOGEN UR STRIP.AUTO-MCNC: 0.2 MG/DL
WBC # BLD AUTO: 10.1 K/UL (ref 4.8–10.8)

## 2021-12-07 PROCEDURE — 83690 ASSAY OF LIPASE: CPT

## 2021-12-07 PROCEDURE — 83605 ASSAY OF LACTIC ACID: CPT

## 2021-12-07 PROCEDURE — 81003 URINALYSIS AUTO W/O SCOPE: CPT

## 2021-12-07 PROCEDURE — 700117 HCHG RX CONTRAST REV CODE 255: Performed by: EMERGENCY MEDICINE

## 2021-12-07 PROCEDURE — 85025 COMPLETE CBC W/AUTO DIFF WBC: CPT

## 2021-12-07 PROCEDURE — 99285 EMERGENCY DEPT VISIT HI MDM: CPT

## 2021-12-07 PROCEDURE — 700105 HCHG RX REV CODE 258: Performed by: EMERGENCY MEDICINE

## 2021-12-07 PROCEDURE — 80053 COMPREHEN METABOLIC PANEL: CPT

## 2021-12-07 PROCEDURE — 700111 HCHG RX REV CODE 636 W/ 250 OVERRIDE (IP): Performed by: EMERGENCY MEDICINE

## 2021-12-07 PROCEDURE — 74177 CT ABD & PELVIS W/CONTRAST: CPT

## 2021-12-07 PROCEDURE — 36415 COLL VENOUS BLD VENIPUNCTURE: CPT

## 2021-12-07 PROCEDURE — 96374 THER/PROPH/DIAG INJ IV PUSH: CPT | Mod: XU

## 2021-12-07 RX ORDER — ONDANSETRON 4 MG/1
4 TABLET, ORALLY DISINTEGRATING ORAL EVERY 8 HOURS PRN
Qty: 6 TABLET | Refills: 0 | Status: SHIPPED | OUTPATIENT
Start: 2021-12-07 | End: 2021-12-09

## 2021-12-07 RX ORDER — SODIUM CHLORIDE 9 MG/ML
1000 INJECTION, SOLUTION INTRAVENOUS ONCE
Status: COMPLETED | OUTPATIENT
Start: 2021-12-07 | End: 2021-12-07

## 2021-12-07 RX ORDER — ONDANSETRON 2 MG/ML
4 INJECTION INTRAMUSCULAR; INTRAVENOUS ONCE
Status: COMPLETED | OUTPATIENT
Start: 2021-12-07 | End: 2021-12-07

## 2021-12-07 RX ADMIN — ONDANSETRON 4 MG: 2 INJECTION INTRAMUSCULAR; INTRAVENOUS at 09:29

## 2021-12-07 RX ADMIN — IOHEXOL 100 ML: 350 INJECTION, SOLUTION INTRAVENOUS at 09:30

## 2021-12-07 RX ADMIN — SODIUM CHLORIDE 1000 ML: 9 INJECTION, SOLUTION INTRAVENOUS at 09:29

## 2021-12-07 ASSESSMENT — PAIN SCALES - WONG BAKER: WONGBAKER_NUMERICALRESPONSE: HURTS JUST A LITTLE BIT

## 2021-12-07 ASSESSMENT — FIBROSIS 4 INDEX: FIB4 SCORE: 0.57

## 2021-12-07 NOTE — DISCHARGE INSTRUCTIONS
Clear liquid diet only over the next 12 to 24 hours immensely advance diet as tolerated starting with only bland and boring foods.    Drink plenty of fluids to stay well-hydrated.    Return to the ER for any worsening abdominal pain, changing abdominal pain, recurrent nausea/vomiting, worsening diarrhea, blood in stool, blood in vomit, chest pain, shortness of breath, or for any concerns.      Follow-up with your primary care physician within the next 1 to 2 days.  Please call for appointment.

## 2021-12-07 NOTE — ED PROVIDER NOTES
ED Provider Note    Scribed for Lulu Calero M.D. by Kate Walter. 12/7/2021  7:36 AM    Primary care provider: CRISTIAN Hamilton  Means of arrival: Walk-In  History obtained from: Patient  History limited by: None    CHIEF COMPLAINT  Chief Complaint   Patient presents with   • Abdominal Pain   • Nausea/Vomiting/Diarrhea       HPI  [place] Meliton Medellin is a 35 y.o. male with a history of cirrhosis, pancreatitis, and ulcers who presents with intermittent mid abdominal pain with associated intermittent nausea, vomiting and diarrhea onset a few days before Thanksgiving. He states that his pain is exacerbated when eating.  If he is not eating he does not have pain.  Patient feels like he often has an urge to defecate shortly after eating.  This is a bit unusual for him.  lasts about 20 minutes before resolving.  He does not get pain during movement or exertion. Sometimes he is with the pain.  He woken up by the pain. He otherwise feels normal until the next meal, where he notices the pain the most. He admits to eating lots of fatty foods. Patient is not currently in pain in the ED. He has associated vomiting and diarrhea. He reports a combination of both watery and loose stools.  He has not vomited every day.  The last time he ate was last night.  He notes he previously would get loose stools secondary to gastritis, but typically does not have watery stool.  Patient reports that the entire family was ill with nausea, vomiting, diarrhea and abdominal pain.  They all came down with the symptoms shortly before Thanksgiving.  However, patient and patient's stepson are still ill with the symptoms.  Everybody else got better.  He has an  medication regimen for diabetes, hypertension, muscle relaxer, and anxiety. It also includes Omeprazole. He stopped taking medication for 3 weeks however has been back on them for 1 week. He has a surgical history of appendectomy but not cholecystectomy. He denies drinking  alcohol. He has two doses of the COVID vaccine. He states that his entire family got infected with COVID 2-3 months ago, however he is the only one that did not come down with Covid.  Patient does not drink alcohol anymore.    REVIEW OF SYSTEMS  Pertinent positives include: Abdominal pain, vomiting, and diarrhea.  See HPI for further details. All other systems are negative.    PAST MEDICAL HISTORY  Past Medical History:   Diagnosis Date   • Alcohol abuse    • Alcohol abuse     5-10 beers   • Alcoholic cirrhosis (HCC)    • Anxiety    • Asthma    • Depression    • ETOH abuse    • Gastritis    • Hypertension    • Liver failure (HCC)    • Pancreatitis    • Ulcer     unsure if abdominal/intestinal       FAMILY HISTORY  Family History   Problem Relation Age of Onset   • Diabetes Brother    • Hypertension Mother        SOCIAL HISTORY  Social History     Socioeconomic History   • Marital status: Single     Spouse name: None noted   • Number of children: None noted   • Years of education: None noted   • Highest education level: None noted   Occupational History   • None noted   Tobacco Use   • Smoking status: Former Smoker     Types: Cigarettes   • Smokeless tobacco: Never Used   Vaping Use   • Vaping Use: Every day   • Substances: THC   Substance and Sexual Activity   • Alcohol use: Yes     Comment: 4/7/2021 quit   • Drug use: Yes     Types: Inhaled     Comment: marajuana daily   • Sexual activity: None noted     SURGICAL HISTORY  Past Surgical History:   Procedure Laterality Date   • APPENDECTOMY     • PB APPENDECTOMY         CURRENT MEDICATIONS  Home Medications     Reviewed by Mohit Jean R.N. (Registered Nurse) on 12/07/21 at 0722  Med List Status: Partial   Medication Last Dose Status   albuterol 108 (90 Base) MCG/ACT Aero Soln inhalation aerosol  Active   amLODIPine (NORVASC) 10 MG Tab  Active   dicyclomine (BENTYL) 20 MG Tab  Active   DULoxetine (CYMBALTA) 60 MG Cap DR Particles delayed-release capsule  Active  "  lisinopril (PRINIVIL) 40 MG tablet  Active   meclizine (ANTIVERT) 25 MG Tab  Active   metFORMIN ER (GLUCOPHAGE XR) 500 MG TABLET SR 24 HR  Active   metoprolol tartrate (LOPRESSOR) 50 MG Tab  Active   naproxen (NAPROSYN) 500 MG Tab  Active   omeprazole (PRILOSEC) 20 MG delayed-release capsule  Active   ondansetron (ZOFRAN ODT) 4 MG TABLET DISPERSIBLE  Active   traZODone (DESYREL) 50 MG Tab  Active                ALLERGIES  No Known Allergies    PHYSICAL EXAM  VITAL SIGNS: /90   Pulse 94   Temp 36.7 °C (98.1 °F) (Temporal)   Resp 18   Ht 1.702 m (5' 7\")   Wt 89 kg (196 lb 3.4 oz)   SpO2 96%   BMI 30.73 kg/m²    Constitutional: Well developed, well nourished; No acute distress; Non-toxic appearance.   HENT: Normocephalic, atraumatic; Bilateral external ears normal. Oropharyngeal exam deferred to COVID-19 outbreak and lack of oropharyngeal complaints.  Eyes: PERRL, EOMI, Conjunctiva normal. No discharge.   Neck:  Supple, nontender midline; No stridor; No nuchal rigidity.   Lymphatic: No cervical lymphadenopathy noted.   Cardiovascular: Regular rate and rhythm without murmurs, rubs, or gallop.   Thorax & Lungs: No respiratory distress, breath sounds clear to auscultation bilaterally without wheezing, rales or rhonchi. Nontender chest wall. No crepitus or subcutaneous air  Abdomen: Soft, bowel sounds normal. No obvious masses; No pulsatile masses; no rebound, guarding, or peritoneal signs. Mild and inconsistent tenderness in the lower quadrant.  Skin: Good color; warm and dry without rash or petechia. Multiple tattoos.   Back: Nontender, No CVA tenderness.   Extremities: Distal radial, dorsalis pedis, posterior tibial pulses are equal bilaterally; No edema; Nontender calves or saphenous, No cyanosis, No clubbing.   Musculoskeletal: Good range of motion in all major joints. No tenderness to palpation or major deformities noted.   Neurologic: Alert & oriented x 4, clear " speech.    LABS/RADIOLOGY/PROCEDURES  Results for orders placed or performed during the hospital encounter of 12/07/21   CBC WITH DIFFERENTIAL   Result Value Ref Range    WBC 10.1 4.8 - 10.8 K/uL    RBC 5.13 4.70 - 6.10 M/uL    Hemoglobin 15.5 14.0 - 18.0 g/dL    Hematocrit 46.8 42.0 - 52.0 %    MCV 91.2 81.4 - 97.8 fL    MCH 30.2 27.0 - 33.0 pg    MCHC 33.1 (L) 33.7 - 35.3 g/dL    RDW 46.5 35.9 - 50.0 fL    Platelet Count 293 164 - 446 K/uL    MPV 11.8 9.0 - 12.9 fL    Neutrophils-Polys 60.50 44.00 - 72.00 %    Lymphocytes 31.40 22.00 - 41.00 %    Monocytes 6.00 0.00 - 13.40 %    Eosinophils 1.10 0.00 - 6.90 %    Basophils 0.50 0.00 - 1.80 %    Immature Granulocytes 0.50 0.00 - 0.90 %    Nucleated RBC 0.00 /100 WBC    Neutrophils (Absolute) 6.12 1.82 - 7.42 K/uL    Lymphs (Absolute) 3.17 1.00 - 4.80 K/uL    Monos (Absolute) 0.61 0.00 - 0.85 K/uL    Eos (Absolute) 0.11 0.00 - 0.51 K/uL    Baso (Absolute) 0.05 0.00 - 0.12 K/uL    Immature Granulocytes (abs) 0.05 0.00 - 0.11 K/uL    NRBC (Absolute) 0.00 K/uL   COMP METABOLIC PANEL   Result Value Ref Range    Sodium 140 135 - 145 mmol/L    Potassium 4.4 3.6 - 5.5 mmol/L    Chloride 105 96 - 112 mmol/L    Co2 22 20 - 33 mmol/L    Anion Gap 13.0 7.0 - 16.0    Glucose 137 (H) 65 - 99 mg/dL    Bun 15 8 - 22 mg/dL    Creatinine 0.79 0.50 - 1.40 mg/dL    Calcium 9.2 8.5 - 10.5 mg/dL    AST(SGOT) 16 12 - 45 U/L    ALT(SGPT) 25 2 - 50 U/L    Alkaline Phosphatase 124 (H) 30 - 99 U/L    Total Bilirubin 0.2 0.1 - 1.5 mg/dL    Albumin 4.5 3.2 - 4.9 g/dL    Total Protein 6.9 6.0 - 8.2 g/dL    Globulin 2.4 1.9 - 3.5 g/dL    A-G Ratio 1.9 g/dL   LIPASE   Result Value Ref Range    Lipase 48 11 - 82 U/L   URINALYSIS    Specimen: Blood   Result Value Ref Range    Color Yellow     Character Clear     Specific Gravity >=1.045 (A) <1.035    Ph 7.5 5.0 - 8.0    Glucose Negative Negative mg/dL    Ketones Negative Negative mg/dL    Protein Negative Negative mg/dL    Bilirubin Negative  Negative    Urobilinogen, Urine 0.2 Negative    Nitrite Negative Negative    Leukocyte Esterase Negative Negative    Occult Blood Negative Negative    Micro Urine Req see below    LACTIC ACID   Result Value Ref Range    Lactic Acid 2.0 0.5 - 2.0 mmol/L   LACTIC ACID   Result Value Ref Range    Lactic Acid 1.1 0.5 - 2.0 mmol/L   ESTIMATED GFR   Result Value Ref Range    GFR If African American >60 >60 mL/min/1.73 m 2    GFR If Non African American >60 >60 mL/min/1.73 m 2      CT-ABDOMEN-PELVIS WITH   Final Result      1.  No acute inflammatory or obstructive process.      2.  No residual fatty infiltration of liver. No cirrhotic appearance of the liver.      3.  No evidence of pancreatitis.      4.  Possible appendectomy changes.      5.  Fluid is present in the distal aspect of the right inguinal canal. This is unchanged.        COURSE & MEDICAL DECISION MAKING  Pertinent Labs & Imaging studies reviewed. (See chart for details)    Review of past medical records showed that the patient had a US gallbladder in April 2021. It revealed no gallstones. He also had a CT abdomen and pelvis in April 2021 which showed hepatic steatosis.     7:36 AM - Patient seen and examined at bedside. Discussed plan of care including labs and then imaging after lab results return. Patient agrees to the plan of care. Ordered for labs to evaluate his symptoms.      8:41 AM - Patient will be treated with NS infusion 1000 mL and Zofran 4 mg for his symptoms. Ordered CT to examine his abdomen and pelvis.    11:50 AM - Patient was reevaluated at bedside. Updated on labs and imaging results, as shown above. It is likely his symptoms are a lingering viral illness. It is advised to continue taking Omeprazole. Patient was strongly recommended to be gentle with his diet for the next few days. I informed the patient for plans of discharge and return instructions. Patient verbalizes understanding and agreement to this plan of care.       Patient  "presents to the ER complaining of intermittent abdominal pain with associated intermittent nausea, vomiting and diarrhea.  Symptoms began a few days before Thanksgiving.  The abdominal pain occurs with eating.  If he is not eating he does not have pain.  Pain lasts about 20 minutes and then resolves.  He has been having a combination of loose and watery stool.  He often has loose stool secondary to \"gastritis\", but he does not usually have watery stool.  This is unusual for him.  Everybody in the family got ill with nausea, vomiting, diarrhea and abdominal pain at the same time.  Most of the family members got better, however, patient and his jyotion are the only ones that continue to have symptoms.  Patient antoni is currently over in the pediatric ER being seen for vomiting and diarrhea and abdominal pain as well.  Patient has not had fevers or chills.  He says he has not had any pain since last night.  His last episode of vomiting was yesterday.  He has some mild inconsistent tenderness in the left lower quadrant.  He describes an urge to defecate.  I went ahead and did a CT scan of the abdomen pelvis to rule out diverticulitis given his symptoms.  CT scan is essentially unremarkable for any acute pathology.  Labs are normal.  Patient is well-appearing.  He is not septic or toxic.  He does not have pain unless he is eating.  This time no concern for ACS.  He is on omeprazole.  He says that despite his abdominal pain, vomiting and diarrhea, he continues to eat a lot of fatty foods and holiday foods.  I suggested that he have a clear liquid diet only for the next 12 to 24 hours and then slowly advance diet as tolerated, but only advancing to bland and boring foods for another day or 2 to let his gut reset a bit.  I suspect he has a viral gastrointestinal syndrome.  It sounds like everybody in the household came down ill with similar symptoms at the same time.  Patient and his jyotion are the only ones that " continue to have symptoms.  Everybody else got better.  Perhaps the patient is just having a difficult time clearing the viral infection.  Consider gallbladder pathology, but the patient had a gallbladder ultrasound done in April of this year and it was negative for gallstones.  Gallbladder labs here in the ER today are normal.  He does not really have any tenderness in the right upper quadrant.  At this time I do not think his symptoms are secondary to cholecystitis or cholelithiasis.   I think he is safe and stable for outpatient management discharge home.  Vital signs are normal stable.  He is well-appearing.  He is not septic or toxic.  He has been given strict return precautions and discharge instructions and he understands treatment plan and follow-up.    HYDRATION: Based on the patient's presentation of Acute Vomiting the patient was given IV fluids. IV Hydration was used because oral hydration was not adequate alone. Upon recheck following hydration, the patient was improved.     The patient will return for new or worsening symptoms and is stable at the time of discharge.    DISPOSITION:  Patient will be discharged home in stable condition.    FOLLOW UP:  CRISTIAN Hamilton  [address] [place] [id number]  [phone]    Schedule an appointment as soon as possible for a visit in 2 days  If symptoms worsen return to ER      OUTPATIENT MEDICATIONS:  Discharge Medication List as of 12/7/2021 12:03 PM      START taking these medications    Details   !! ondansetron (ZOFRAN ODT) 4 MG TABLET DISPERSIBLE Take 1 Tablet by mouth every 8 hours as needed for up to 2 days., Disp-6 Tablet, R-0, Normal       !! - Potential duplicate medications found. Please discuss with provider.           FINAL IMPRESSION  1. Abdominal pain, unspecified abdominal location Acute   2. Nausea and vomiting, intractability of vomiting not specified, unspecified vomiting type Acute   3. Diarrhea, unspecified type Acute         This dictation has been created using voice recognition software. The accuracy of the dictation is limited by the abilities of the software. I expect there may be some errors of grammar and possibly content. I made every attempt to manually correct the errors within my dictation. However, errors related to voice recognition software may still exist and should be interpreted within the appropriate context.      Kate GONZALEZ (Elizabeth), am scribing for, and in the presence of, Lulu Calero M.D..    Electronically signed by: Kate Elizabeth), 12/7/2021    Lulu GONZALEZ M.D. personally performed the services described in this documentation, as scribed by Kate Watler in my presence, and it is both accurate and complete.    The note accurately reflects work and decisions made by me.  Lulu Calero M.D.  12/7/2021  2:37 PM

## 2021-12-07 NOTE — ED TRIAGE NOTES
Pt amb to triage.  Chief Complaint   Patient presents with   • Abdominal Pain   • Nausea/Vomiting/Diarrhea     X2weeks. Hx of cirrhosis, pancreatitis, ulcers. Reports he has not had alcohol xmonths. Pt reports symptoms are worse after eating.

## 2022-01-30 ENCOUNTER — HOSPITAL ENCOUNTER (EMERGENCY)
Facility: MEDICAL CENTER | Age: 37
End: 2022-01-30
Attending: EMERGENCY MEDICINE
Payer: MEDICAID

## 2022-01-30 VITALS
BODY MASS INDEX: 31.97 KG/M2 | HEART RATE: 74 BPM | HEIGHT: 67 IN | DIASTOLIC BLOOD PRESSURE: 90 MMHG | WEIGHT: 203.71 LBS | OXYGEN SATURATION: 96 % | TEMPERATURE: 98 F | SYSTOLIC BLOOD PRESSURE: 137 MMHG | RESPIRATION RATE: 18 BRPM

## 2022-01-30 DIAGNOSIS — L73.2 AXILLARY HIDRADENITIS SUPPURATIVA: ICD-10-CM

## 2022-01-30 DIAGNOSIS — L02.419 ABSCESS OF AXILLARY REGION: ICD-10-CM

## 2022-01-30 PROCEDURE — 303977 HCHG I & D

## 2022-01-30 PROCEDURE — 99282 EMERGENCY DEPT VISIT SF MDM: CPT | Mod: 25

## 2022-01-30 PROCEDURE — 700101 HCHG RX REV CODE 250: Performed by: EMERGENCY MEDICINE

## 2022-01-30 RX ORDER — LIDOCAINE HCL/EPINEPHRINE/PF 2%-1:200K
10 VIAL (ML) INJECTION ONCE
Status: COMPLETED | OUTPATIENT
Start: 2022-01-30 | End: 2022-01-30

## 2022-01-30 RX ADMIN — LIDOCAINE HYDROCHLORIDE AND EPINEPHRINE 10 ML: 20; 5 INJECTION, SOLUTION EPIDURAL; INFILTRATION; INTRACAUDAL; PERINEURAL at 17:48

## 2022-01-30 ASSESSMENT — FIBROSIS 4 INDEX: FIB4 SCORE: 0.4

## 2022-01-31 NOTE — DISCHARGE INSTRUCTIONS
In 72 hours, Wednesday evening, use a clean pair of scissors and cut the blue string next to the knot on one side and pulled the entire thing out from your skin.  It should come out very easily.  In the meantime shower often and keep this area very clean, expect ongoing drainage over the next 3 days.

## 2022-01-31 NOTE — ED PROVIDER NOTES
"ED Provider Note    CHIEF COMPLAINT  Chief Complaint   Patient presents with   • Abscess   • Arm Pain       HPI  Leo Medellin is a 36 y.o. male who presents for evaluation of an abscess in the right axilla.  The patient has a history of these reoccurring, he is diabetic, he states this current one has been going on for about a week.  He has localized pain.  No fever.  No nausea or vomiting.  States this is the biggest one is ever had.  He offers no other acute complaints at this time.  He has had multiple incision and drainages over the years.    REVIEW OF SYSTEMS  Negative for fever, rash, chest pain, dyspnea, abdominal pain, back pain.     PAST MEDICAL HISTORY   has a past medical history of Alcohol abuse, Alcohol abuse, Alcoholic cirrhosis (HCC), Anxiety, Asthma, Depression, ETOH abuse, Gastritis, Hypertension, Liver failure (HCC), Pancreatitis, and Ulcer.    SOCIAL HISTORY  Social History     Tobacco Use   • Smoking status: Former Smoker     Types: Cigarettes   • Smokeless tobacco: Never Used   Vaping Use   • Vaping Use: Every day   • Substances: THC   Substance and Sexual Activity   • Alcohol use: Yes     Comment: 4/7/2021 quit   • Drug use: Yes     Types: Inhaled     Comment: marajuana daily   • Sexual activity: Not on file       SURGICAL HISTORY   has a past surgical history that includes appendectomy and appendectomy.    CURRENT MEDICATIONS  I personally reviewed the medication list in the charting documentation.     ALLERGIES  No Known Allergies    PHYSICAL EXAM  VITAL SIGNS: /85   Pulse 95   Temp 36.4 °C (97.6 °F) (Temporal)   Resp 16   Ht 1.702 m (5' 7\")   Wt 92.4 kg (203 lb 11.3 oz)   SpO2 97%   BMI 31.90 kg/m²   Constitutional: Well appearing patient in no acute distress.  Awake and alert, not toxic nor ill in appearance.  HENT: Normocephalic, no obvious evidence of acute trauma.   Neck: Comfortable movement without any obvious restriction in the range of motion.  Eyes: Conjunctiva " normal, Non-icteric.   Chest: Normal nonlabored respirations.  Skin: Inspection of the right axilla reveals a large 4 cm erythematous area with fluctuance.  A few palpable nodes are also present.  Musculoskeletal: No obvious restriction in the range of motion in all major joints.   Neurologic: Alert, No obvious focal deficits noted.   Psychiatric: Affect normal for clinical presentation    DIAGNOSTIC STUDIES / PROCEDURES    Incision and Drainage Procedure Note    Indication: axillary abscess    Procedure: The patient was positioned appropriately and the skin over the incision site was prepped with betadine and draped in a sterile fashion and prepped with alcohol. Local anesthesia was obtained by infiltration using 2% Lidocaine with epinephrine.  2 incisions were then made over the greatest area of fluctuance and approximately 10 cc of thick and purulent material was expressed. Loculations were broken up using forceps and more of the material was able to be expressed.  A vessel loop was used through both incisions and tied in the not to keep the incisions open    The patient tolerated the procedure well.    Complications: None      COURSE & MEDICAL DECISION MAKING  Pertinent Labs & Imaging studies reviewed. (See chart for details)    Encounter Summary: This is a very pleasant 36 y.o. male who unfortunately required evaluation in the emergency department today with a right axillary abscess, I&D performed here in the emergency department, vessel technique used to stent the incisions open.  The patient has a long history of these recurrently, I suspect he has hydradenitis suppurativa, he will follow up with a primary care physician for further evaluation and care.  Return instructions provided.  He has also been instructed to cut and remove the vessel loop in 3 days      DISPOSITION: Discharge Home      FINAL IMPRESSION  1. Abscess of axillary region    2. Axillary hidradenitis suppurativa        This dictation was  created using voice recognition software. The accuracy of the dictation is limited to the abilities of the software. I expect there may be some errors of grammar and possibly content. The nursing notes were reviewed and certain aspects of this information were incorporated into this note.    Electronically signed by: Adryan Corcoran M.D., 1/30/2022 5:56 PM

## 2022-01-31 NOTE — ED TRIAGE NOTES
Patient to ED with complaints of right axillary abscess. Swelling starting about 1 weeks ago. Significant swelling and pain to underarm. He is diabetic.   A family member does have covid 19 but he only reports fatigue.    Pt educated on ED process and asked to wait in lobby. Patient educated on importance of alerting staff to new or worsening symptoms or concerns.

## 2022-02-08 ENCOUNTER — HOSPITAL ENCOUNTER (EMERGENCY)
Facility: MEDICAL CENTER | Age: 37
End: 2022-02-08
Attending: EMERGENCY MEDICINE
Payer: MEDICAID

## 2022-02-08 VITALS
DIASTOLIC BLOOD PRESSURE: 98 MMHG | HEIGHT: 67 IN | RESPIRATION RATE: 16 BRPM | TEMPERATURE: 97.6 F | WEIGHT: 205.25 LBS | HEART RATE: 80 BPM | BODY MASS INDEX: 32.21 KG/M2 | SYSTOLIC BLOOD PRESSURE: 138 MMHG | OXYGEN SATURATION: 97 %

## 2022-02-08 DIAGNOSIS — J06.9 UPPER RESPIRATORY TRACT INFECTION, UNSPECIFIED TYPE: ICD-10-CM

## 2022-02-08 DIAGNOSIS — H91.90 PERCEIVED HEARING CHANGES: ICD-10-CM

## 2022-02-08 LAB
SARS-COV-2 RNA RESP QL NAA+PROBE: NOTDETECTED
SPECIMEN SOURCE: NORMAL

## 2022-02-08 PROCEDURE — U0003 INFECTIOUS AGENT DETECTION BY NUCLEIC ACID (DNA OR RNA); SEVERE ACUTE RESPIRATORY SYNDROME CORONAVIRUS 2 (SARS-COV-2) (CORONAVIRUS DISEASE [COVID-19]), AMPLIFIED PROBE TECHNIQUE, MAKING USE OF HIGH THROUGHPUT TECHNOLOGIES AS DESCRIBED BY CMS-2020-01-R: HCPCS

## 2022-02-08 PROCEDURE — 99283 EMERGENCY DEPT VISIT LOW MDM: CPT

## 2022-02-08 PROCEDURE — U0005 INFEC AGEN DETEC AMPLI PROBE: HCPCS

## 2022-02-08 ASSESSMENT — FIBROSIS 4 INDEX: FIB4 SCORE: 0.4

## 2022-02-08 ASSESSMENT — LIFESTYLE VARIABLES: DO YOU DRINK ALCOHOL: NO

## 2022-02-08 NOTE — ED TRIAGE NOTES
"Chief Complaint   Patient presents with   • Ear Pain     RT ear x 3 days    • Hearing Loss     both ears since yesterday.     Pt ambulatory to triage for above. No distress noted.    /100   Pulse 88   Temp 36.1 °C (97 °F) (Temporal)   Resp 14   Ht 1.702 m (5' 7\")   Wt 93.1 kg (205 lb 4 oz)   SpO2 96%   BMI 32.15 kg/m²     "

## 2022-02-08 NOTE — ED PROVIDER NOTES
ED Provider Note    Scribed for Tray Cullen M.D. by Dieudonne Posada. 2/8/2022  10:10 AM    Primary care provider: CRISTIAN Hamilton  Means of arrival: Walk-in  History obtained from: Patient  History limited by: None    CHIEF COMPLAINT  Chief Complaint   Patient presents with   • Ear Pain     RT ear x 3 days    • Hearing Loss     both ears since yesterday.       HPI  Leo Medellin is a 36 y.o. male who presents to the Emergency Department for evaluation of right ear pain onset three days ago. He has associated symptoms of bilateral hearing loss, fatigue, congestion, and generalized body aches onset yesterday. He denies any fevers. His children have COVID-19 currently. He is vaccinated and boosted for COVID-19 and has no history of COVID-19. He notes that he had a rapid test yesterday that was negative. He has seen an ENT in the last few years for his ears, as he has a history of tinnitus in his left ear. He denies wearing any hearing aids. He denies taking aspirin daily.      REVIEW OF SYSTEMS  Pertinent positives include: right ear pain, bilateral hearing loss, fatigue, congestion, and generalized body aches. Pertinent negatives include: fever. See history of present illness.      PAST MEDICAL HISTORY   has a past medical history of Alcohol abuse, Alcohol abuse, Alcoholic cirrhosis (HCC), Anxiety, Asthma, Depression, ETOH abuse, Gastritis, Hypertension, Liver failure (HCC), Pancreatitis, and Ulcer.    SURGICAL HISTORY   has a past surgical history that includes appendectomy and appendectomy.    SOCIAL HISTORY  Social History     Tobacco Use   • Smoking status: Former Smoker     Types: Cigarettes   • Smokeless tobacco: Never Used   Vaping Use   • Vaping Use: Every day   • Substances: THC   Substance Use Topics   • Alcohol use: Not Currently     Comment: 4/7/2021 quit   • Drug use: Yes     Types: Inhaled     Comment: maragurpreet daily      Social History     Substance and Sexual Activity   Drug  "Use Yes   • Types: Inhaled    Comment: lianne daily       FAMILY HISTORY  Family History   Problem Relation Age of Onset   • Diabetes Brother    • Hypertension Mother        CURRENT MEDICATIONS  Home Medications     Reviewed by Berny Hernandez R.N. (Registered Nurse) on 02/08/22 at 0716  Med List Status: Partial   Medication Last Dose Status   albuterol 108 (90 Base) MCG/ACT Aero Soln inhalation aerosol  Active   amLODIPine (NORVASC) 10 MG Tab  Active   dicyclomine (BENTYL) 20 MG Tab  Active   DULoxetine (CYMBALTA) 60 MG Cap DR Particles delayed-release capsule  Active   lisinopril (PRINIVIL) 40 MG tablet  Active   meclizine (ANTIVERT) 25 MG Tab  Active   metFORMIN ER (GLUCOPHAGE XR) 500 MG TABLET SR 24 HR  Active   metoprolol tartrate (LOPRESSOR) 50 MG Tab  Active   naproxen (NAPROSYN) 500 MG Tab  Active   omeprazole (PRILOSEC) 20 MG delayed-release capsule  Active   ondansetron (ZOFRAN ODT) 4 MG TABLET DISPERSIBLE  Active   traZODone (DESYREL) 50 MG Tab  Active                ALLERGIES  No Known Allergies    PHYSICAL EXAM  VITAL SIGNS: /100   Pulse 88   Temp 36.1 °C (97 °F) (Temporal)   Resp 14   Ht 1.702 m (5' 7\")   Wt 93.1 kg (205 lb 4 oz)   SpO2 96%   BMI 32.15 kg/m²     Pulse ox interpretation: I interpret this pulse ox as normal.  Constitutional: Alert in no apparent distress.  HENT: Uvula midline, Bilateral erythema in the TMs, no effusion. Normocephalic, Atraumatic, Bilateral external ears normal. Nose normal.   Eyes: Pupils are equal and reactive. Conjunctiva normal, non-icteric.   Heart: Regular rate and rhythm, no murmurs.    Lungs: Clear to auscultation bilaterally.  Skin: Warm, Dry, No erythema, No rash.   Neurologic: Alert, Grossly non-focal.   Psychiatric: Affect normal, Judgment normal, Mood normal, Appears appropriate and not intoxicated.       DIAGNOSTIC STUDIES / PROCEDURES    LABS  Labs Reviewed   SARS-COV-2, PCR (IN-HOUSE)      All labs reviewed by me.    COURSE & MEDICAL " DECISION MAKING  Nursing notes, VS, PMSFHx reviewed in chart.    36 y.o. male p/w chief complaint of right ear pain.    10:10 AM Patient seen and examined at bedside. I discussed plan for discharge and follow up as outlined below. The patient verbalizes they feel comfortable going home. The patient is stable for discharge at this time and will return for any new or worsening symptoms. Patient verbalizes understanding and support with my plan for discharge.       I verified that the patient was wearing a mask and I was wearing appropriate PPE every time I entered the room. The patient's mask was on the patient at all times during my encounter except for a brief view of the oropharynx.     The differential diagnoses include but are not limited to:     #Ear pain, hearing loss, fatigue, congestion, and generalized body aches  -COVID-19, Upper respiratory Illness  Sars-CoV-2 PCR ordered for further evaluation.   No: muffled voice, drooling, stridor, tripoding, trismus, crepitus or trauma.   Unremarkable VS upon dc  No e/o stridor or tongue elevation and pt w/ FROM of neck w/o pain, doubt deep space neck infection  No e/o PTA on exam  No rash or e/o cellulitis  No evidence of otitis media  I counseled patient to use his previously prescribed not currently using Flonase and also mention that he could consider using Afrin or decongestions over-the-counter however both of these last 2 items can be addictive and only produce relief for minutes to hours at a time as opposed to treating underlying condition.       The patient is appropriate for outpatient treatment this time. They are defined as lower risk using a combination of physical exam and history of present illness.    1. I discussed with the patient that they likely have a viral illness and may have COVID-19.   Because your COVID-19 test result is pending, we discussed that the patient will need to remain in home quarantine in accordance with CDC guidelines.     They  agreed to return for worsening or persistent symptoms, significant or worsening shortness of breath or severe lightheadedness.       The patient will return for new or worsening symptoms and is stable at the time of discharge.    The patient is referred to a primary physician for blood pressure management, diabetic screening, and for all other preventative health concerns.    DISPOSITION:  Patient will be discharged home in stable condition.    FOLLOW UP:  CRISTIAN Hamilton  [address] [facility] [id number] [phone]    In 3 days      [facility], Emergency Dept  [address] [facility] [address] [phone]    If symptoms worsen    Sarwat Guzman M.D.  [address] [facility] [address] [phone]      call to schedule Ear, nose and throat follow up        FINAL IMPRESSION  1. Upper respiratory tract infection, unspecified type    2. Perceived hearing changes          Dieudonne GONZALEZ (Scribe), am scribing for, and in the presence of, rTay Cullen M.D..    Electronically signed by: Dieudonne Posada (Scribe), 2/8/2022    ITray M.D. personally performed the services described in this documentation, as scribed by Dieudonne Posada in my presence, and it is both accurate and complete.    The note accurately reflects work and decisions made by me.  Tray Cullen M.D.  2/8/2022  3:51 PM

## 2022-02-10 ENCOUNTER — APPOINTMENT (OUTPATIENT)
Dept: RADIOLOGY | Facility: MEDICAL CENTER | Age: 37
End: 2022-02-10
Attending: EMERGENCY MEDICINE

## 2022-02-10 ENCOUNTER — APPOINTMENT (OUTPATIENT)
Dept: RADIOLOGY | Facility: MEDICAL CENTER | Age: 37
End: 2022-02-10
Attending: STUDENT IN AN ORGANIZED HEALTH CARE EDUCATION/TRAINING PROGRAM

## 2022-02-10 ENCOUNTER — APPOINTMENT (OUTPATIENT)
Dept: RADIOLOGY | Facility: MEDICAL CENTER | Age: 37
End: 2022-02-10
Attending: EMERGENCY MEDICINE
Payer: MEDICAID

## 2022-02-10 ENCOUNTER — HOSPITAL ENCOUNTER (OUTPATIENT)
Facility: MEDICAL CENTER | Age: 37
End: 2022-02-11
Attending: EMERGENCY MEDICINE | Admitting: FAMILY MEDICINE
Payer: MEDICAID

## 2022-02-10 DIAGNOSIS — R53.1 WEAKNESS: ICD-10-CM

## 2022-02-10 DIAGNOSIS — K85.90 ACUTE PANCREATITIS, UNSPECIFIED COMPLICATION STATUS, UNSPECIFIED PANCREATITIS TYPE: ICD-10-CM

## 2022-02-10 LAB
ALBUMIN SERPL BCP-MCNC: 4.5 G/DL (ref 3.2–4.9)
ALBUMIN/GLOB SERPL: 1.5 G/DL
ALP SERPL-CCNC: 141 U/L (ref 30–99)
ALT SERPL-CCNC: 54 U/L (ref 2–50)
ANION GAP SERPL CALC-SCNC: 11 MMOL/L (ref 7–16)
APPEARANCE UR: CLEAR
AST SERPL-CCNC: 34 U/L (ref 12–45)
BASOPHILS # BLD AUTO: 0.3 % (ref 0–1.8)
BASOPHILS # BLD: 0.04 K/UL (ref 0–0.12)
BILIRUB SERPL-MCNC: 0.2 MG/DL (ref 0.1–1.5)
BILIRUB UR QL STRIP.AUTO: NEGATIVE
BUN SERPL-MCNC: 12 MG/DL (ref 8–22)
CALCIUM SERPL-MCNC: 9.2 MG/DL (ref 8.5–10.5)
CHLORIDE SERPL-SCNC: 105 MMOL/L (ref 96–112)
CO2 SERPL-SCNC: 22 MMOL/L (ref 20–33)
COLOR UR: YELLOW
CREAT SERPL-MCNC: 0.85 MG/DL (ref 0.5–1.4)
EOSINOPHIL # BLD AUTO: 0.13 K/UL (ref 0–0.51)
EOSINOPHIL NFR BLD: 1.1 % (ref 0–6.9)
ERYTHROCYTE [DISTWIDTH] IN BLOOD BY AUTOMATED COUNT: 44.4 FL (ref 35.9–50)
FLUAV RNA SPEC QL NAA+PROBE: NEGATIVE
FLUBV RNA SPEC QL NAA+PROBE: NEGATIVE
GLOBULIN SER CALC-MCNC: 3.1 G/DL (ref 1.9–3.5)
GLUCOSE BLD-MCNC: 178 MG/DL (ref 65–99)
GLUCOSE SERPL-MCNC: 171 MG/DL (ref 65–99)
GLUCOSE UR STRIP.AUTO-MCNC: NEGATIVE MG/DL
HCT VFR BLD AUTO: 47.6 % (ref 42–52)
HGB BLD-MCNC: 15.6 G/DL (ref 14–18)
IMM GRANULOCYTES # BLD AUTO: 0.09 K/UL (ref 0–0.11)
IMM GRANULOCYTES NFR BLD AUTO: 0.8 % (ref 0–0.9)
KETONES UR STRIP.AUTO-MCNC: NEGATIVE MG/DL
LEUKOCYTE ESTERASE UR QL STRIP.AUTO: NEGATIVE
LIPASE SERPL-CCNC: 277 U/L (ref 11–82)
LYMPHOCYTES # BLD AUTO: 2.13 K/UL (ref 1–4.8)
LYMPHOCYTES NFR BLD: 17.9 % (ref 22–41)
MCH RBC QN AUTO: 29.7 PG (ref 27–33)
MCHC RBC AUTO-ENTMCNC: 32.8 G/DL (ref 33.7–35.3)
MCV RBC AUTO: 90.5 FL (ref 81.4–97.8)
MICRO URNS: NORMAL
MONOCYTES # BLD AUTO: 0.6 K/UL (ref 0–0.85)
MONOCYTES NFR BLD AUTO: 5 % (ref 0–13.4)
NEUTROPHILS # BLD AUTO: 8.93 K/UL (ref 1.82–7.42)
NEUTROPHILS NFR BLD: 74.9 % (ref 44–72)
NITRITE UR QL STRIP.AUTO: NEGATIVE
NRBC # BLD AUTO: 0 K/UL
NRBC BLD-RTO: 0 /100 WBC
PH UR STRIP.AUTO: 5.5 [PH] (ref 5–8)
PLATELET # BLD AUTO: 295 K/UL (ref 164–446)
PMV BLD AUTO: 10.8 FL (ref 9–12.9)
POTASSIUM SERPL-SCNC: 4.5 MMOL/L (ref 3.6–5.5)
PROT SERPL-MCNC: 7.6 G/DL (ref 6–8.2)
PROT UR QL STRIP: NEGATIVE MG/DL
RBC # BLD AUTO: 5.26 M/UL (ref 4.7–6.1)
RBC UR QL AUTO: NEGATIVE
RSV RNA SPEC QL NAA+PROBE: NEGATIVE
SARS-COV-2 RNA RESP QL NAA+PROBE: NOTDETECTED
SODIUM SERPL-SCNC: 138 MMOL/L (ref 135–145)
SP GR UR STRIP.AUTO: 1.02
SPECIMEN SOURCE: NORMAL
UROBILINOGEN UR STRIP.AUTO-MCNC: 0.2 MG/DL
WBC # BLD AUTO: 11.9 K/UL (ref 4.8–10.8)

## 2022-02-10 PROCEDURE — 82962 GLUCOSE BLOOD TEST: CPT

## 2022-02-10 PROCEDURE — C9803 HOPD COVID-19 SPEC COLLECT: HCPCS | Performed by: EMERGENCY MEDICINE

## 2022-02-10 PROCEDURE — 71045 X-RAY EXAM CHEST 1 VIEW: CPT

## 2022-02-10 PROCEDURE — G0378 HOSPITAL OBSERVATION PER HR: HCPCS

## 2022-02-10 PROCEDURE — 76705 ECHO EXAM OF ABDOMEN: CPT

## 2022-02-10 PROCEDURE — 85025 COMPLETE CBC W/AUTO DIFF WBC: CPT

## 2022-02-10 PROCEDURE — 700102 HCHG RX REV CODE 250 W/ 637 OVERRIDE(OP): Performed by: STUDENT IN AN ORGANIZED HEALTH CARE EDUCATION/TRAINING PROGRAM

## 2022-02-10 PROCEDURE — 96375 TX/PRO/DX INJ NEW DRUG ADDON: CPT

## 2022-02-10 PROCEDURE — 700111 HCHG RX REV CODE 636 W/ 250 OVERRIDE (IP): Performed by: EMERGENCY MEDICINE

## 2022-02-10 PROCEDURE — 96374 THER/PROPH/DIAG INJ IV PUSH: CPT

## 2022-02-10 PROCEDURE — 0241U HCHG SARS-COV-2 COVID-19 NFCT DS RESP RNA 4 TRGT MIC: CPT

## 2022-02-10 PROCEDURE — 83690 ASSAY OF LIPASE: CPT

## 2022-02-10 PROCEDURE — 700105 HCHG RX REV CODE 258: Performed by: STUDENT IN AN ORGANIZED HEALTH CARE EDUCATION/TRAINING PROGRAM

## 2022-02-10 PROCEDURE — 700105 HCHG RX REV CODE 258: Performed by: EMERGENCY MEDICINE

## 2022-02-10 PROCEDURE — 99219 PR INITIAL OBSERVATION CARE,LEVL II: CPT | Mod: GC | Performed by: FAMILY MEDICINE

## 2022-02-10 PROCEDURE — A9270 NON-COVERED ITEM OR SERVICE: HCPCS | Performed by: STUDENT IN AN ORGANIZED HEALTH CARE EDUCATION/TRAINING PROGRAM

## 2022-02-10 PROCEDURE — 81003 URINALYSIS AUTO W/O SCOPE: CPT

## 2022-02-10 PROCEDURE — 80053 COMPREHEN METABOLIC PANEL: CPT

## 2022-02-10 PROCEDURE — 99285 EMERGENCY DEPT VISIT HI MDM: CPT

## 2022-02-10 RX ORDER — METOPROLOL SUCCINATE 25 MG/1
25 TABLET, EXTENDED RELEASE ORAL DAILY
COMMUNITY

## 2022-02-10 RX ORDER — PROCHLORPERAZINE EDISYLATE 5 MG/ML
5-10 INJECTION INTRAMUSCULAR; INTRAVENOUS EVERY 4 HOURS PRN
Status: DISCONTINUED | OUTPATIENT
Start: 2022-02-10 | End: 2022-02-11 | Stop reason: HOSPADM

## 2022-02-10 RX ORDER — MORPHINE SULFATE 2 MG/ML
2 INJECTION, SOLUTION INTRAMUSCULAR; INTRAVENOUS
Status: DISCONTINUED | OUTPATIENT
Start: 2022-02-10 | End: 2022-02-10

## 2022-02-10 RX ORDER — DULOXETIN HYDROCHLORIDE 60 MG/1
60 CAPSULE, DELAYED RELEASE ORAL
Status: DISCONTINUED | OUTPATIENT
Start: 2022-02-10 | End: 2022-02-11 | Stop reason: HOSPADM

## 2022-02-10 RX ORDER — ONDANSETRON 2 MG/ML
4 INJECTION INTRAMUSCULAR; INTRAVENOUS ONCE
Status: COMPLETED | OUTPATIENT
Start: 2022-02-10 | End: 2022-02-10

## 2022-02-10 RX ORDER — LABETALOL HYDROCHLORIDE 5 MG/ML
10 INJECTION, SOLUTION INTRAVENOUS EVERY 6 HOURS PRN
Status: DISCONTINUED | OUTPATIENT
Start: 2022-02-10 | End: 2022-02-11 | Stop reason: HOSPADM

## 2022-02-10 RX ORDER — METOPROLOL SUCCINATE 25 MG/1
25 TABLET, EXTENDED RELEASE ORAL DAILY
Status: DISCONTINUED | OUTPATIENT
Start: 2022-02-11 | End: 2022-02-11 | Stop reason: HOSPADM

## 2022-02-10 RX ORDER — LISINOPRIL 20 MG/1
40 TABLET ORAL DAILY
Status: DISCONTINUED | OUTPATIENT
Start: 2022-02-10 | End: 2022-02-11 | Stop reason: HOSPADM

## 2022-02-10 RX ORDER — BISACODYL 10 MG
10 SUPPOSITORY, RECTAL RECTAL
Status: DISCONTINUED | OUTPATIENT
Start: 2022-02-10 | End: 2022-02-11 | Stop reason: HOSPADM

## 2022-02-10 RX ORDER — ACETAMINOPHEN 325 MG/1
650 TABLET ORAL EVERY 6 HOURS PRN
Status: DISCONTINUED | OUTPATIENT
Start: 2022-02-10 | End: 2022-02-11 | Stop reason: HOSPADM

## 2022-02-10 RX ORDER — AMOXICILLIN 250 MG
2 CAPSULE ORAL 2 TIMES DAILY
Status: DISCONTINUED | OUTPATIENT
Start: 2022-02-11 | End: 2022-02-11 | Stop reason: HOSPADM

## 2022-02-10 RX ORDER — PROMETHAZINE HYDROCHLORIDE 25 MG/1
12.5-25 SUPPOSITORY RECTAL EVERY 4 HOURS PRN
Status: DISCONTINUED | OUTPATIENT
Start: 2022-02-10 | End: 2022-02-11 | Stop reason: HOSPADM

## 2022-02-10 RX ORDER — SODIUM CHLORIDE 9 MG/ML
1000 INJECTION, SOLUTION INTRAVENOUS CONTINUOUS
Status: ACTIVE | OUTPATIENT
Start: 2022-02-10 | End: 2022-02-10

## 2022-02-10 RX ORDER — ONDANSETRON 4 MG/1
4 TABLET, ORALLY DISINTEGRATING ORAL EVERY 4 HOURS PRN
Status: DISCONTINUED | OUTPATIENT
Start: 2022-02-10 | End: 2022-02-11 | Stop reason: HOSPADM

## 2022-02-10 RX ORDER — POLYETHYLENE GLYCOL 3350 17 G/17G
1 POWDER, FOR SOLUTION ORAL
Status: DISCONTINUED | OUTPATIENT
Start: 2022-02-10 | End: 2022-02-11 | Stop reason: HOSPADM

## 2022-02-10 RX ORDER — MORPHINE SULFATE 4 MG/ML
2 INJECTION INTRAVENOUS
Status: DISCONTINUED | OUTPATIENT
Start: 2022-02-10 | End: 2022-02-11 | Stop reason: HOSPADM

## 2022-02-10 RX ORDER — OXYCODONE HYDROCHLORIDE 5 MG/1
5 TABLET ORAL EVERY 4 HOURS PRN
Status: DISCONTINUED | OUTPATIENT
Start: 2022-02-10 | End: 2022-02-11 | Stop reason: HOSPADM

## 2022-02-10 RX ORDER — TRAZODONE HYDROCHLORIDE 50 MG/1
50 TABLET ORAL
Status: DISCONTINUED | OUTPATIENT
Start: 2022-02-10 | End: 2022-02-11 | Stop reason: HOSPADM

## 2022-02-10 RX ORDER — AMLODIPINE BESYLATE 10 MG/1
10 TABLET ORAL DAILY
Status: DISCONTINUED | OUTPATIENT
Start: 2022-02-11 | End: 2022-02-11 | Stop reason: HOSPADM

## 2022-02-10 RX ORDER — SODIUM CHLORIDE, SODIUM LACTATE, POTASSIUM CHLORIDE, CALCIUM CHLORIDE 600; 310; 30; 20 MG/100ML; MG/100ML; MG/100ML; MG/100ML
INJECTION, SOLUTION INTRAVENOUS CONTINUOUS
Status: DISCONTINUED | OUTPATIENT
Start: 2022-02-10 | End: 2022-02-11 | Stop reason: HOSPADM

## 2022-02-10 RX ORDER — MORPHINE SULFATE 4 MG/ML
4 INJECTION INTRAVENOUS ONCE
Status: COMPLETED | OUTPATIENT
Start: 2022-02-10 | End: 2022-02-10

## 2022-02-10 RX ORDER — ONDANSETRON 2 MG/ML
4 INJECTION INTRAMUSCULAR; INTRAVENOUS EVERY 4 HOURS PRN
Status: DISCONTINUED | OUTPATIENT
Start: 2022-02-10 | End: 2022-02-11 | Stop reason: HOSPADM

## 2022-02-10 RX ORDER — PROMETHAZINE HYDROCHLORIDE 25 MG/1
12.5-25 TABLET ORAL EVERY 4 HOURS PRN
Status: DISCONTINUED | OUTPATIENT
Start: 2022-02-10 | End: 2022-02-11 | Stop reason: HOSPADM

## 2022-02-10 RX ADMIN — SODIUM CHLORIDE, POTASSIUM CHLORIDE, SODIUM LACTATE AND CALCIUM CHLORIDE: 600; 310; 30; 20 INJECTION, SOLUTION INTRAVENOUS at 19:04

## 2022-02-10 RX ADMIN — DULOXETINE HYDROCHLORIDE 60 MG: 60 CAPSULE, DELAYED RELEASE ORAL at 21:20

## 2022-02-10 RX ADMIN — SODIUM CHLORIDE 1000 ML: 9 INJECTION, SOLUTION INTRAVENOUS at 13:02

## 2022-02-10 RX ADMIN — MORPHINE SULFATE 4 MG: 4 INJECTION INTRAVENOUS at 13:03

## 2022-02-10 RX ADMIN — ONDANSETRON 4 MG: 2 INJECTION INTRAMUSCULAR; INTRAVENOUS at 13:03

## 2022-02-10 RX ADMIN — LISINOPRIL 40 MG: 20 TABLET ORAL at 21:20

## 2022-02-10 RX ADMIN — SODIUM CHLORIDE, POTASSIUM CHLORIDE, SODIUM LACTATE AND CALCIUM CHLORIDE 1000 ML: 600; 310; 30; 20 INJECTION, SOLUTION INTRAVENOUS at 17:09

## 2022-02-10 RX ADMIN — TRAZODONE HYDROCHLORIDE 50 MG: 50 TABLET ORAL at 21:20

## 2022-02-10 ASSESSMENT — LIFESTYLE VARIABLES
AVERAGE NUMBER OF DAYS PER WEEK YOU HAVE A DRINK CONTAINING ALCOHOL: 0
TOTAL SCORE: 0
TOTAL SCORE: 0
HAVE YOU EVER FELT YOU SHOULD CUT DOWN ON YOUR DRINKING: NO
EVER HAD A DRINK FIRST THING IN THE MORNING TO STEADY YOUR NERVES TO GET RID OF A HANGOVER: NO
EVER FELT BAD OR GUILTY ABOUT YOUR DRINKING: NO
TOTAL SCORE: 0
ALCOHOL_USE: NO
CONSUMPTION TOTAL: NEGATIVE
HOW MANY TIMES IN THE PAST YEAR HAVE YOU HAD 5 OR MORE DRINKS IN A DAY: 0
HAVE PEOPLE ANNOYED YOU BY CRITICIZING YOUR DRINKING: NO
ON A TYPICAL DAY WHEN YOU DRINK ALCOHOL HOW MANY DRINKS DO YOU HAVE: 0
DOES PATIENT WANT TO STOP DRINKING: NO

## 2022-02-10 ASSESSMENT — PATIENT HEALTH QUESTIONNAIRE - PHQ9
1. LITTLE INTEREST OR PLEASURE IN DOING THINGS: NOT AT ALL
2. FEELING DOWN, DEPRESSED, IRRITABLE, OR HOPELESS: NOT AT ALL
SUM OF ALL RESPONSES TO PHQ9 QUESTIONS 1 AND 2: 0
SUM OF ALL RESPONSES TO PHQ9 QUESTIONS 1 AND 2: 0
2. FEELING DOWN, DEPRESSED, IRRITABLE, OR HOPELESS: NOT AT ALL
1. LITTLE INTEREST OR PLEASURE IN DOING THINGS: NOT AT ALL

## 2022-02-10 ASSESSMENT — FIBROSIS 4 INDEX
FIB4 SCORE: 0.4
FIB4 SCORE: 0.58

## 2022-02-10 ASSESSMENT — PAIN DESCRIPTION - PAIN TYPE: TYPE: ACUTE PAIN

## 2022-02-10 NOTE — ED TRIAGE NOTES
Chief Complaint   Patient presents with   • Fatigue     Pt complains of intermittent fatigue, headache, and mid ab pain x 1 week. Per patient he stopped taking his meds for DM and HTN 2 weeks ago. Pt states he normally feels unwell after he doesn't take his meds. FSBG 178       Pt amb to triage with steady gait for above complaint.   Pt is alert and oriented, speaking in full sentences, follows commands and responds appropriately to questions. Resp are even and unlabored. No obvious acute distress.    Pt placed in lobby. Pt educated on triage process. Pt encouraged to alert staff for any changes.    Vitals:    02/10/22 0649   BP: 154/118   Pulse:    Resp:    Temp:    SpO2:

## 2022-02-10 NOTE — ED PROVIDER NOTES
ED Provider Note    Scribed for Ingrid Nye M.D. by Josue Cortez-Reyes. 2/10/2022  12:44 PM    Primary care provider: CRISTIAN Hamilton  Means of arrival: Walk-in  History obtained from: Patient  History limited by: None    CHIEF COMPLAINT  Chief Complaint   Patient presents with    Fatigue     Pt complains of intermittent fatigue, headache, and mid ab pain x 1 week. Per patient he stopped taking his meds for DM and HTN 2 weeks ago. Pt states he normally feels unwell after he doesn't take his meds. FSBG 178       HPI  Leo Medellin is a 36 y.o. male who presents to the Emergency Department for evaluation of generalized fatigue. He endorses additional blurry vision, vomiting, and abdominal pain but denies any diarrhea. He also reports a cough and cold like symptoms onset a week ago but he believes this is secondary to his allergies. He is a diabetic and notes that his blood glucose was elevated this morning in the 300's. He reports that he is treated with metformin for his diabetes. He denies smoking cigarettes but notes that he does smoke marijuana. He has been vaccinated for COVID. The patient states that he was seen here in the ED 2 days ago with hypertension and a week ago with hearing loss.    REVIEW OF SYSTEMS  PULMONARY: cough  GI: Abdominal pain and vomitng  Neuro: generalized fatigue    See history of present illness. All other systems are negative. C.    PAST MEDICAL HISTORY   has a past medical history of Alcohol abuse, Alcohol abuse, Alcoholic cirrhosis (HCC), Anxiety, Asthma, Depression, ETOH abuse, Gastritis, Hypertension, Liver failure (HCC), Pancreatitis, and Ulcer.    SURGICAL HISTORY   has a past surgical history that includes appendectomy and appendectomy.    SOCIAL HISTORY  Social History     Tobacco Use    Smoking status: Former Smoker     Types: Cigarettes    Smokeless tobacco: Never Used   Vaping Use    Vaping Use: Every day    Substances: THC   Substance Use Topics     "Alcohol use: Not Currently     Comment: 4/7/2021 quit    Drug use: Yes     Types: Inhaled     Comment: lianne daily      Social History     Substance and Sexual Activity   Drug Use Yes    Types: Inhaled    Comment: lianne daily       FAMILY HISTORY  Family History   Problem Relation Age of Onset    Diabetes Brother     Hypertension Mother        CURRENT MEDICATIONS  Current Outpatient Medications   Medication Instructions    albuterol 108 (90 Base) MCG/ACT Aero Soln inhalation aerosol 2 Puffs, Inhalation, EVERY 6 HOURS PRN    amLODIPine (NORVASC) 10 mg, Oral, DAILY    dicyclomine (BENTYL) 20 mg, Oral, EVERY 6 HOURS PRN    DULoxetine (CYMBALTA) 60 mg, Oral, EVERY BEDTIME    lisinopril (PRINIVIL) 40 mg, Oral, DAILY    meclizine (ANTIVERT) 25 mg, Oral, 3 TIMES DAILY PRN    metFORMIN ER (GLUCOPHAGE XR) 500 mg, Oral, DAILY    metoprolol tartrate (LOPRESSOR) 50 mg, Oral, 2 TIMES DAILY    naproxen (NAPROSYN) 500 mg, Oral, 2 TIMES DAILY WITH MEALS    omeprazole (PRILOSEC) 20 mg, Oral, 2 TIMES DAILY    ondansetron (ZOFRAN ODT) 4 mg, Oral, EVERY 4 HOURS PRN    traZODone (DESYREL)  mg, Oral, EVERY BEDTIME PRN, 1 to 2 tablets = 50 to 100 mg.       ALLERGIES  No Known Allergies    PHYSICAL EXAM  VITAL SIGNS: /92   Pulse 90   Temp 37 °C (98.6 °F) (Temporal)   Resp 14   Ht 1.702 m (5' 7\")   Wt 93.7 kg (206 lb 9.1 oz)   SpO2 97%   BMI 32.35 kg/m²     Constitutional: Well developed, Well nourished, No acute distress, Non-toxic appearance.   HEENT: Normocephalic, Atraumatic,  external ears normal, pharynx pink,  Mucous  Membranes moist, No rhinorrhea or mucosal edema  Eyes: PERRL, EOMI, Conjunctiva normal, No discharge.   Neck: Normal range of motion, No tenderness, Supple, No stridor.   Lymphatic: No lymphadenopathy    Cardiovascular: Regular Rate and Rhythm, No murmurs,  rubs, or gallops.   Thorax & Lungs: Lungs clear to auscultation bilaterally, No respiratory distress, No wheezes, rhales or rhonchi, No " chest wall tenderness.   Abdomen: Bowel sounds normal, Soft, mild epigastric tenderness worse on left, non distended,  No pulsatile masses., no rebound guarding or peritoneal signs.   Skin: Warm, Dry, No erythema, No rash,   Back:  No CVA tenderness,  No spinal tenderness, bony crepitance, step offs, or instability.   Neurologic: Alert & oriented clear speech no focal deficits  Extremities: Equal, intact distal pulses, No cyanosis, clubbing or edema,  No tenderness.   Musculoskeletal: Good range of motion in all major joints. No tenderness to palpation or major deformities noted.       DIAGNOSTIC STUDIES / PROCEDURES    LABS  Results for orders placed or performed during the hospital encounter of 02/10/22   CBC WITH DIFFERENTIAL   Result Value Ref Range    WBC 11.9 (H) 4.8 - 10.8 K/uL    RBC 5.26 4.70 - 6.10 M/uL    Hemoglobin 15.6 14.0 - 18.0 g/dL    Hematocrit 47.6 42.0 - 52.0 %    MCV 90.5 81.4 - 97.8 fL    MCH 29.7 27.0 - 33.0 pg    MCHC 32.8 (L) 33.7 - 35.3 g/dL    RDW 44.4 35.9 - 50.0 fL    Platelet Count 295 164 - 446 K/uL    MPV 10.8 9.0 - 12.9 fL    Neutrophils-Polys 74.90 (H) 44.00 - 72.00 %    Lymphocytes 17.90 (L) 22.00 - 41.00 %    Monocytes 5.00 0.00 - 13.40 %    Eosinophils 1.10 0.00 - 6.90 %    Basophils 0.30 0.00 - 1.80 %    Immature Granulocytes 0.80 0.00 - 0.90 %    Nucleated RBC 0.00 /100 WBC    Neutrophils (Absolute) 8.93 (H) 1.82 - 7.42 K/uL    Lymphs (Absolute) 2.13 1.00 - 4.80 K/uL    Monos (Absolute) 0.60 0.00 - 0.85 K/uL    Eos (Absolute) 0.13 0.00 - 0.51 K/uL    Baso (Absolute) 0.04 0.00 - 0.12 K/uL    Immature Granulocytes (abs) 0.09 0.00 - 0.11 K/uL    NRBC (Absolute) 0.00 K/uL   COMP METABOLIC PANEL   Result Value Ref Range    Sodium 138 135 - 145 mmol/L    Potassium 4.5 3.6 - 5.5 mmol/L    Chloride 105 96 - 112 mmol/L    Co2 22 20 - 33 mmol/L    Anion Gap 11.0 7.0 - 16.0    Glucose 171 (H) 65 - 99 mg/dL    Bun 12 8 - 22 mg/dL    Creatinine 0.85 0.50 - 1.40 mg/dL    Calcium 9.2 8.5 -  10.5 mg/dL    AST(SGOT) 34 12 - 45 U/L    ALT(SGPT) 54 (H) 2 - 50 U/L    Alkaline Phosphatase 141 (H) 30 - 99 U/L    Total Bilirubin 0.2 0.1 - 1.5 mg/dL    Albumin 4.5 3.2 - 4.9 g/dL    Total Protein 7.6 6.0 - 8.2 g/dL    Globulin 3.1 1.9 - 3.5 g/dL    A-G Ratio 1.5 g/dL   LIPASE   Result Value Ref Range    Lipase 277 (H) 11 - 82 U/L   URINALYSIS    Specimen: Urine   Result Value Ref Range    Color Yellow     Character Clear     Specific Gravity 1.024 <1.035    Ph 5.5 5.0 - 8.0    Glucose Negative Negative mg/dL    Ketones Negative Negative mg/dL    Protein Negative Negative mg/dL    Bilirubin Negative Negative    Urobilinogen, Urine 0.2 Negative    Nitrite Negative Negative    Leukocyte Esterase Negative Negative    Occult Blood Negative Negative    Micro Urine Req see below    ESTIMATED GFR   Result Value Ref Range    GFR If African American >60 >60 mL/min/1.73 m 2    GFR If Non African American >60 >60 mL/min/1.73 m 2   CoV-2, FLU A/B, and RSV by PCR (2-4 Hours CEPHEID) : Collect NP swab in VTM    Specimen: Respirate   Result Value Ref Range    Influenza virus A RNA Negative Negative    Influenza virus B, PCR Negative Negative    RSV, PCR Negative Negative    SARS-CoV-2 by PCR NotDetected     SARS-CoV-2 Source NP Swab    POCT glucose device results   Result Value Ref Range    Glucose - Accu-Ck 178 (H) 65 - 99 mg/dL       All labs reviewed by me.        RADIOLOGY  US-RUQ   Final Result      1.  4 x 6 x 5 mm LEFT hepatic nodule has some imaging features suggestive of a hemangioma though it is not entirely specific   2.  Otherwise unremarkable RIGHT upper quadrant ultrasound      DX-CHEST-PORTABLE (1 VIEW)   Final Result      No acute cardiopulmonary abnormality.        The radiologist's interpretation of all radiological studies have been reviewed by me.    COURSE & MEDICAL DECISION MAKING  Nursing notes, ADRIENNE, PMSFHx reviewed in chart.    12:44 PM - Patient seen and examined at bedside. Patient will be treated with  morphine 4 mg/ml injection and Zofran 4 mg injection. Intravenous fluids administered for elevated glucose and pancreatitis. Ordered DX-Chest, US-RUQ, CoV-2, FLU A/B, and RSV by PCR, Estimated GFR< CBC with differential, CMP, Urinalysis, and lipase to evaluate his symptoms. The differential diagnoses include but are not limited to: DKA, COVID, and Pancreatitis. I informed the patient of my plan to obtain the above labs and imaging to evaluate his symptoms. Patient verbalizes understanding and agreement to this plan of care.     2:49 PM - Paged Hospitalist    2:52 PM - I reevaluated the patient at bedside. I updated him on his lab and imaging results noted above. I also informed him of my plan to have him evaluated for hospitalization. Patient verbalizes understanding and agreement to this plan of care.     2:58 PM - I discussed the patient's case and the above findings with [facility] family who agrees to evaluate the patient for hospitalization.       HYDRATION: Based on the patient's presentation of Hyperglycemia the patient was given IV fluids. IV Hydration was used because oral hydration was not adequate alone. Upon recheck following hydration, the patient was well improved.        DISPOSITION:  Patient will be hospitalized by [facility] family in guarded condition.      FINAL IMPRESSION  1. Acute pancreatitis, unspecified complication status, unspecified pancreatitis type    2. Weakness          I, Josue Cortez-Reyes (Tgibsherif), am scribing for, and in the presence of, Ingrid Nye M.D..    Electronically signed by: Josue Cortez-Reyes (Elizabeth), 2/10/2022    Ingrid GONZALEZ M.D. personally performed the services described in this documentation, as scribed by Josue Cortez-Reyes in my presence, and it is both accurate and complete. C.    The note accurately reflects work and decisions made by me.  Ingrid Nye M.D.  2/10/2022  5:29 PM

## 2022-02-10 NOTE — ED NOTES
Med Rec completed per patient and home pharmacy ([facility] [place])  Allergies reviewed  No ORAL antibiotics in last 30 days    Patient states that he has been out of his medications for about 2 weeks

## 2022-02-10 NOTE — H&P
[facility] MEDICINE HISTORY AND PHYSICAL     PATIENT ID:  NAME:  Leo Medellin  MRN:               [id number]  YOB: 1985    Date of Admission: 2/10/2022     Attending: Dr. Toledo     Resident: Leslee Cooper MD     Primary Care Physician:  Calvin Dueñas     CC:  Fatigue; abd pain      HPI: Leo Medellin is a 36 y.o. male past medical history alcoholic cirrhosis; recurrent pancreatitis; diabetes; depression; anxiety; intermittent GERD.  Who presented with 1 week of worsening fatigue and vague abdominal pain.  Patient states this pain feels similar to previous episodes of pancreatitis.  This has been associated with nausea and one episode of vomiting today.  Vomitus was nonbilious, nonbloody.  Since arriving to the emergency room he has been given IV morphine which has significantly helped alleviate his pain.  Patient denies changes in his bowel habits.    ERCourse:  Vitals: Initial blood pressure 176/134; heart rate 111.  Improvement in blood pressure to 137/92; heart rate improved to 91.  Patient afebrile  Labs: Glucose 171; lipase 277  RUQ ultrasound: 4 x 6 x 5 mm left hepatic nodule suggestive of hemangioma.  Gallbladder within normal limits.  No evidence of choledocholithiasis.    REVIEW OF SYSTEMS:   Ten systems reviewed and were negative except as noted in the HPI.                PAST MEDICAL HISTORY:  Past Medical History:   Diagnosis Date   • Alcohol abuse    • Alcohol abuse     5-10 beers   • Alcoholic cirrhosis (HCC)    • Anxiety    • Asthma    • Depression    • ETOH abuse    • Gastritis    • Hypertension    • Liver failure (HCC)    • Pancreatitis    • Ulcer     unsure if abdominal/intestinal       PAST SURGICAL HISTORY:  Past Surgical History:   Procedure Laterality Date   • APPENDECTOMY     • KS APPENDECTOMY         FAMILY HISTORY:  Family History   Problem Relation Age of Onset   • Diabetes Brother    • Hypertension Mother        SOCIAL HISTORY:   Social History:  "  Tobacco:  10-pack-year history  Alcohol:  Previous use.  Has been sober for 3 years.  He did relapse in 2021 but has been sober since then.  Recreational drugs (illegal and prescription):  Previous  Activity Level: Independent with activities of daily living.  Living situation:  Lives at home with his girlfriend and her children.  Recent travel:  Denies.  Primary Care Provider:  Followed closely by [facility] clinic  Other (stressors, spirituality, exposures):   None    DIET:   Orders Placed This Encounter   Procedures   • Diet NPO Restrict to: Strict     Standing Status:   Standing     Number of Occurrences:   1     Order Specific Question:   Diet NPO Restrict to:     Answer:   Strict [1]       ALLERGIES:  No Known Allergies    OUTPATIENT MEDICATIONS:  No current facility-administered medications for this encounter.    Current Outpatient Medications:   •  metoprolol SR (TOPROL XL) 25 MG TABLET SR 24 HR, Take 25 mg by mouth every day., Disp: , Rfl:   •  amLODIPine (NORVASC) 10 MG Tab, Take 1 tablet by mouth every day., Disp: 90 tablet, Rfl: 0  •  lisinopril (PRINIVIL) 40 MG tablet, Take 1 tablet by mouth every day., Disp: 90 tablet, Rfl: 0  •  metFORMIN ER (GLUCOPHAGE XR) 500 MG TABLET SR 24 HR, Take 1 tablet by mouth every day., Disp: 90 tablet, Rfl: 0  •  DULoxetine (CYMBALTA) 60 MG Cap DR Particles delayed-release capsule, Take 60 mg by mouth at bedtime. Indications: Major Depressive Disorder, Disp: , Rfl:   •  traZODone (DESYREL) 50 MG Tab, Take  mg by mouth at bedtime as needed for Sleep. 1 to 2 tablets = 50 to 100 mg.  Indications: Trouble Sleeping, Disp: , Rfl:     PHYSICAL EXAM:  Vitals:    02/10/22 0643 02/10/22 0649 02/10/22 1143   BP: (!) 176/134 154/118 137/92   Pulse: (!) 111  90   Resp: 18  14   Temp: 36.3 °C (97.3 °F)  37 °C (98.6 °F)   TempSrc: Temporal  Temporal   SpO2: 95%  97%   Weight:  93.7 kg (206 lb 9.1 oz)    Height:  1.702 m (5' 7\")    , Temp (24hrs), Av.7 °C (98 °F), Min:36.3 °C " "(97.3 °F), Max:37 °C (98.6 °F)  , Pulse Oximetry: 97 %, O2 (LPM): 0    General: Pt resting in NAD, cooperative   Skin:  Pink, warm and dry.  No rashes  HEENT: NC/AT. PERRL. EOMI. MMM. No nasal discharge. Oropharynx nonerythematous without exudate/plaques  Neck:  Supple without lymphadenopathy or rigidity.   Lungs:  Symmetrical.  CTAB with no W/R/R.  Good air movement   Cardiovascular:  S1/S2 RRR without murmurs  Abdomen:  Abdomen is soft, mildly distended, diffuse minimal tenderness to palpation, +BS. No masses noted.  Extremities:  Full range of motion. No gross deformities noted. 2+ pulses in all extremities. No edema   Spine:  Straight without vertebral anomalies.  CNS:  Muscle tone is normal. No gross focal neurologic deficits      LAB TESTS:   Recent Labs     02/10/22  0734   WBC 11.9*   RBC 5.26   HEMOGLOBIN 15.6   HEMATOCRIT 47.6   MCV 90.5   MCH 29.7   RDW 44.4   PLATELETCT 295   MPV 10.8   NEUTSPOLYS 74.90*   LYMPHOCYTES 17.90*   MONOCYTES 5.00   EOSINOPHILS 1.10   BASOPHILS 0.30         Recent Labs     02/10/22  0734   SODIUM 138   POTASSIUM 4.5   CHLORIDE 105   CO2 22   BUN 12   CREATININE 0.85   CALCIUM 9.2   ALBUMIN 4.5       CULTURES:   Results     Procedure Component Value Units Date/Time    CoV-2, FLU A/B, and RSV by PCR (2-4 Hours [employer]) : Collect NP swab in VTM [[id number]] Collected: 02/10/22 1334    Order Status: Completed Specimen: Respirate Updated: 02/10/22 1451     Influenza virus A RNA Negative     Influenza virus B, PCR Negative     RSV, PCR Negative     SARS-CoV-2 by PCR NotDetected     Comment: PATIENTS: Important information regarding your results and instructions can  be found at [email or web address]   \"After your  Covid-19 Test\"    [facility] providers: PLEASE REFER TO DE-ESCALATION AND RETESTING PROTOCOL  on [email or web address]    **The [facility] GeneXpert Xpress SARS-CoV-2 RT-PCR Test has been made  available for use under the Emergency Use Authorization (EUA) " only.          SARS-CoV-2 Source NP Swab    URINALYSIS [[id number]] Collected: 02/10/22 1200    Order Status: Completed Specimen: Urine Updated: 02/10/22 1217     Color Yellow     Character Clear     Specific Gravity 1.024     Ph 5.5     Glucose Negative mg/dL      Ketones Negative mg/dL      Protein Negative mg/dL      Bilirubin Negative     Urobilinogen, Urine 0.2     Nitrite Negative     Leukocyte Esterase Negative     Occult Blood Negative     Micro Urine Req see below     Comment: Microscopic examination not performed when specimen is clear  and chemically negative for protein, blood, leukocyte esterase  and nitrite.               IMAGES:  US-RUQ   Final Result      1.  4 x 6 x 5 mm LEFT hepatic nodule has some imaging features suggestive of a hemangioma though it is not entirely specific   2.  Otherwise unremarkable RIGHT upper quadrant ultrasound      DX-CHEST-PORTABLE (1 VIEW)   Final Result      No acute cardiopulmonary abnormality.        CONSULTS:   None     ASSESSMENT/PLAN:    Leo Medellin is a 36 y.o. male past medical history alcoholic cirrhosis; recurrent pancreatitis; diabetes; depression; anxiety; intermittent GERD. Admitted for pancreatitis flare.     # Pancreatitis   History of pancreatitis secondary to alcohol intake. Patient has been sober since 6/2021. Lipase elevated to 277 on admission. Diffuse minimal tenderness to palpation. US RUQ: without signs of gallbladder disease/stone/distension.   - IVF overnight  - Trial clear liquid diet  - IV morphine for pain relief   - ABD US pending   - Lipid panel pending     # History of alcoholic liver injury  CT ABD/Pelvis 12/2021: without signs of cirrhosis.   CT ABD/Pelvis 4/2021: Hepatic steatosis.  - ABD US pending due to new onset ABD distension     # HTN  - Continue home Amlodipine 10mg   - Continue home Lisinopril 40mg     # Type II DM with hyperglycemia   A1C 4/7/2021: 9.0; repeat pending   - Sliding insulin scale during admission.   -  Followed by PCP at [facility]     # Depression / Anxiety   - Continue home Duloxetine 60mg daily    # Insomnia   - Continue trazodone 50mg daily    Core Measures:   Fluids: IVF 125mL/hour   Lines: IVP  Abx: None indicated   DVT prophylaxis: Lovenox   Code Status: Full     Leslee Cooper MD  PGY-2  [facility]

## 2022-02-11 ENCOUNTER — PATIENT OUTREACH (OUTPATIENT)
Dept: HEALTH INFORMATION MANAGEMENT | Facility: OTHER | Age: 37
End: 2022-02-11

## 2022-02-11 ENCOUNTER — PHARMACY VISIT (OUTPATIENT)
Dept: PHARMACY | Facility: MEDICAL CENTER | Age: 37
End: 2022-02-11
Payer: COMMERCIAL

## 2022-02-11 VITALS
SYSTOLIC BLOOD PRESSURE: 155 MMHG | BODY MASS INDEX: 31.83 KG/M2 | WEIGHT: 202.82 LBS | TEMPERATURE: 97.4 F | RESPIRATION RATE: 18 BRPM | OXYGEN SATURATION: 94 % | HEART RATE: 85 BPM | HEIGHT: 67 IN | DIASTOLIC BLOOD PRESSURE: 91 MMHG

## 2022-02-11 LAB
ALBUMIN SERPL BCP-MCNC: 4.1 G/DL (ref 3.2–4.9)
ALBUMIN/GLOB SERPL: 1.5 G/DL
ALP SERPL-CCNC: 131 U/L (ref 30–99)
ALT SERPL-CCNC: 41 U/L (ref 2–50)
ANION GAP SERPL CALC-SCNC: 10 MMOL/L (ref 7–16)
AST SERPL-CCNC: 23 U/L (ref 12–45)
BILIRUB SERPL-MCNC: 0.4 MG/DL (ref 0.1–1.5)
BUN SERPL-MCNC: 9 MG/DL (ref 8–22)
CALCIUM SERPL-MCNC: 9 MG/DL (ref 8.5–10.5)
CHLORIDE SERPL-SCNC: 102 MMOL/L (ref 96–112)
CHOLEST SERPL-MCNC: 219 MG/DL (ref 100–199)
CO2 SERPL-SCNC: 23 MMOL/L (ref 20–33)
CREAT SERPL-MCNC: 0.71 MG/DL (ref 0.5–1.4)
ERYTHROCYTE [DISTWIDTH] IN BLOOD BY AUTOMATED COUNT: 43.5 FL (ref 35.9–50)
GLOBULIN SER CALC-MCNC: 2.8 G/DL (ref 1.9–3.5)
GLUCOSE SERPL-MCNC: 143 MG/DL (ref 65–99)
HCT VFR BLD AUTO: 46.5 % (ref 42–52)
HDLC SERPL-MCNC: 42 MG/DL
HGB BLD-MCNC: 15.6 G/DL (ref 14–18)
LDLC SERPL CALC-MCNC: 131 MG/DL
MCH RBC QN AUTO: 30.1 PG (ref 27–33)
MCHC RBC AUTO-ENTMCNC: 33.5 G/DL (ref 33.7–35.3)
MCV RBC AUTO: 89.8 FL (ref 81.4–97.8)
PLATELET # BLD AUTO: 283 K/UL (ref 164–446)
PMV BLD AUTO: 11 FL (ref 9–12.9)
POTASSIUM SERPL-SCNC: 4 MMOL/L (ref 3.6–5.5)
PROT SERPL-MCNC: 6.9 G/DL (ref 6–8.2)
RBC # BLD AUTO: 5.18 M/UL (ref 4.7–6.1)
SODIUM SERPL-SCNC: 135 MMOL/L (ref 135–145)
TRIGL SERPL-MCNC: 229 MG/DL (ref 0–149)
WBC # BLD AUTO: 9.8 K/UL (ref 4.8–10.8)

## 2022-02-11 PROCEDURE — 80053 COMPREHEN METABOLIC PANEL: CPT

## 2022-02-11 PROCEDURE — G0378 HOSPITAL OBSERVATION PER HR: HCPCS

## 2022-02-11 PROCEDURE — 80061 LIPID PANEL: CPT

## 2022-02-11 PROCEDURE — 700105 HCHG RX REV CODE 258: Performed by: STUDENT IN AN ORGANIZED HEALTH CARE EDUCATION/TRAINING PROGRAM

## 2022-02-11 PROCEDURE — 85027 COMPLETE CBC AUTOMATED: CPT

## 2022-02-11 PROCEDURE — 700102 HCHG RX REV CODE 250 W/ 637 OVERRIDE(OP): Performed by: STUDENT IN AN ORGANIZED HEALTH CARE EDUCATION/TRAINING PROGRAM

## 2022-02-11 PROCEDURE — RXMED WILLOW AMBULATORY MEDICATION CHARGE: Performed by: HOSPITALIST

## 2022-02-11 PROCEDURE — 99217 PR OBSERVATION CARE DISCHARGE: CPT | Performed by: HOSPITALIST

## 2022-02-11 PROCEDURE — A9270 NON-COVERED ITEM OR SERVICE: HCPCS | Performed by: STUDENT IN AN ORGANIZED HEALTH CARE EDUCATION/TRAINING PROGRAM

## 2022-02-11 RX ORDER — OXYCODONE HYDROCHLORIDE 5 MG/1
5 TABLET ORAL EVERY 6 HOURS PRN
Qty: 24 TABLET | Refills: 0 | Status: SHIPPED | OUTPATIENT
Start: 2022-02-11 | End: 2022-02-17

## 2022-02-11 RX ORDER — ATORVASTATIN CALCIUM 40 MG/1
40 TABLET, FILM COATED ORAL EVERY EVENING
Qty: 30 TABLET | Refills: 3 | Status: SHIPPED | OUTPATIENT
Start: 2022-02-11

## 2022-02-11 RX ORDER — ATORVASTATIN CALCIUM 40 MG/1
40 TABLET, FILM COATED ORAL EVERY EVENING
Status: DISCONTINUED | OUTPATIENT
Start: 2022-02-11 | End: 2022-02-11 | Stop reason: HOSPADM

## 2022-02-11 RX ADMIN — SODIUM CHLORIDE, POTASSIUM CHLORIDE, SODIUM LACTATE AND CALCIUM CHLORIDE: 600; 310; 30; 20 INJECTION, SOLUTION INTRAVENOUS at 02:00

## 2022-02-11 RX ADMIN — METOPROLOL SUCCINATE 25 MG: 25 TABLET, EXTENDED RELEASE ORAL at 05:26

## 2022-02-11 RX ADMIN — AMLODIPINE BESYLATE 10 MG: 10 TABLET ORAL at 05:26

## 2022-02-11 ASSESSMENT — PAIN DESCRIPTION - PAIN TYPE
TYPE: ACUTE PAIN

## 2022-02-11 NOTE — PROGRESS NOTES
Discharge instructions, medications and follow-up reviewed with pt, pt verbalized understanding and denies questions. Discharge paperwork given to pt. Pt provided with medication from Meds to bed. Pt provided with MD work note. PIV removed, armband removed. Pt ambulate off unit with hospital escort.

## 2022-02-11 NOTE — DISCHARGE SUMMARY
Discharge Summary    CHIEF COMPLAINT ON ADMISSION  Chief Complaint   Patient presents with   • Fatigue     Pt complains of intermittent fatigue, headache, and mid ab pain x 1 week. Per patient he stopped taking his meds for DM and HTN 2 weeks ago. Pt states he normally feels unwell after he doesn't take his meds. FSBG 178       Reason for Admission  Blood Sugar      Admission Date  2/10/2022    CODE STATUS  Full Code    HPI & HOSPITAL COURSE    As per [facility] h+p      Leo Medellin is a 36 y.o. male past medical history alcoholic cirrhosis; recurrent pancreatitis; diabetes; depression; anxiety; intermittent GERD.  Who presented with 1 week of worsening fatigue and vague abdominal pain.  Patient states this pain feels similar to previous episodes of pancreatitis.  This has been associated with nausea and one episode of vomiting today.  Vomitus was nonbilious, nonbloody.  Since arriving to the emergency room he has been given IV morphine which has significantly helped alleviate his pain.  Patient denies changes in his bowel habits.      Patient was hospitalized and kept n.p.o. given IV hydration and pain management with IV narcotics.  As his  abdominal pain improved , patient with started on a clear liquid diet and advance as tolerated to a GI soft diet.  He tolerated a diet on 2/11/2022.  Patient states that he has not drank alcohol in over 3 years.  Review of his medications shows that there are some medications associate with pancreatitis particularly trazodone and lisinopril.  He was told to hold the trazodone at this time follow-up with his PCP for further recommendations for other sleep aids.  If pancreatitis continues to recur and patient is abstaining from alcohol,  consider discontinuation of lisinopril and switching to another antihypertensive medication.      Therefore, he is discharged in good and stable condition to home with close outpatient follow-up.    The patient recovered much more quickly than  anticipated on admission.    Discharge Date  2/11/2022    FOLLOW UP ITEMS POST DISCHARGE    DISCHARGE DIAGNOSES  Principal Problem (Resolved):    Pancreatitis, recurrent POA: Yes  Active Problems:    * No active hospital problems. *      FOLLOW UP  No future appointments.  CRISTIAN Hamilton  [address] [facility] [address]  [phone]    Call  Please call your primary care provider to schedule a hospital follow up. Thank you.       MEDICATIONS ON DISCHARGE     Medication List      START taking these medications      Instructions   atorvastatin 40 MG Tabs  Commonly known as: LIPITOR   Take 1 Tablet by mouth every evening.  Dose: 40 mg     oxyCODONE immediate-release 5 MG Tabs  Commonly known as: ROXICODONE   Take 1 Tablet by mouth every 6 hours as needed for up to 6 days.  Dose: 5 mg        CONTINUE taking these medications      Instructions   amLODIPine 10 MG Tabs  Commonly known as: NORVASC   Take 1 tablet by mouth every day.  Dose: 10 mg     DULoxetine 60 MG Cpep delayed-release capsule  Commonly known as: CYMBALTA   Take 60 mg by mouth at bedtime. Indications: Major Depressive Disorder  Dose: 60 mg     lisinopril 40 MG tablet  Commonly known as: PRINIVIL   Take 1 tablet by mouth every day.  Dose: 40 mg     metFORMIN  MG Tb24  Commonly known as: GLUCOPHAGE XR   Take 1 tablet by mouth every day.  Dose: 500 mg     metoprolol SR 25 MG Tb24  Commonly known as: TOPROL XL   Take 25 mg by mouth every day.  Dose: 25 mg        STOP taking these medications    traZODone 50 MG Tabs  Commonly known as: DESYREL            Allergies  No Known Allergies    DIET  Orders Placed This Encounter   Procedures   • Diet Order Diet: Low Fiber(GI Soft)     Standing Status:   Standing     Number of Occurrences:   1     Order Specific Question:   Diet:     Answer:   Low Fiber(GI Soft) [2]       ACTIVITY  As tolerated.  Weight bearing as tolerated    CONSULTATIONS    PROCEDURES    LABORATORY  Lab Results   Component  Value Date    SODIUM 135 02/11/2022    POTASSIUM 4.0 02/11/2022    CHLORIDE 102 02/11/2022    CO2 23 02/11/2022    GLUCOSE 143 (H) 02/11/2022    BUN 9 02/11/2022    CREATININE 0.71 02/11/2022    CREATININE 1.0 04/19/2006        Lab Results   Component Value Date    WBC 9.8 02/11/2022    HEMOGLOBIN 15.6 02/11/2022    HEMATOCRIT 46.5 02/11/2022    PLATELETCT 283 02/11/2022        Total time of the discharge process exceeds 38  minutes.

## 2022-02-11 NOTE — PROGRESS NOTES
To whom it may concern     Leo Medellin was under our medical care at [facility] from 2/20-2/11/22.      We are recommended continued absence of work until Monday, 2-14-22. He may return on Monday 2- with no restrictions      Please excuse his absence        Questions          Ezio Barksdale MD

## 2022-02-11 NOTE — PROGRESS NOTES
4 Eyes Skin Assessment Completed by SABINE Askew and SABINE Pang.    Head Scar  Ears WDL  Nose WDL  Mouth WDL  Neck WDL  Breast/Chest WDL  Shoulder Blades WDL  Spine WDL  (R) Arm/Elbow/Hand WDL  (L) Arm/Elbow/Hand WDL  Abdomen WDL  Groin WDL  Scrotum/Coccyx/Buttocks WDL  (R) Leg WDL  (L) Leg WDL  (R) Heel/Foot/Toe WDL  (L) Heel/Foot/Toe WDL          Devices In Places Blood Pressure Cuff and Pulse Ox      Interventions In Place Pillows    Possible Skin Injury No    Pictures Uploaded Into Epic N/A  Wound Consult Placed N/A  RN Wound Prevention Protocol Ordered No

## 2022-02-11 NOTE — CARE PLAN
The patient is Stable - Low risk of patient condition declining or worsening    Shift Goals  Clinical Goals: IV fluids, pain mgmt   Patient Goals: safeety, comfort   Family Goals: safety     Progress made toward(s) clinical / shift goals:    Problem: Knowledge Deficit - Standard  Goal: Patient and family/care givers will demonstrate understanding of plan of care, disease process/condition, diagnostic tests and medications  Outcome: Progressing     Problem: Pain - Standard  Goal: Alleviation of pain or a reduction in pain to the patient’s comfort goal  Outcome: Progressing     Problem: Hemodynamics  Goal: Patient's hemodynamics, fluid balance and neurologic status will be stable or improve  Outcome: Progressing     Problem: Gastrointestinal Irritability  Goal: Nausea and vomiting will be absent or improve  Outcome: Progressing     Problem: Mobility  Goal: Patient's capacity to carry out activities will improve  Outcome: Progressing     Problem: Self Care  Goal: Patient will have the ability to perform ADLs independently or with assistance (bathe, groom, dress, toilet and feed)  Outcome: Progressing     Problem: Infection - Standard  Goal: Patient will remain free from infection  Outcome: Progressing       Patient is not progressing towards the following goals:

## 2022-02-11 NOTE — DISCHARGE PLANNING
Meds-to-Beds: Discharge prescription orders listed below delivered to patient's bedside and locked in patient cabinet. RN Shannon notified. Patient counseled. Patient elected to have co-payment billed to patient account.       Current Outpatient Medications   Medication Sig Dispense Refill   • oxyCODONE immediate-release (ROXICODONE) 5 MG Tab Take 1 Tablet by mouth every 6 hours as needed for up to 6 days. 24 Tablet 0   • atorvastatin (LIPITOR) 40 MG Tab Take 1 Tablet by mouth every evening. 30 Tablet 3      Elisha Lipscomb, PharmD

## 2022-02-11 NOTE — DISCHARGE INSTRUCTIONS
Discharge Instructions    Discharged to home by car with relative. Discharged via wheelchair, hospital escort: Yes.  Special equipment needed: Not Applicable    Be sure to schedule a follow-up appointment with your primary care doctor or any specialists as instructed.     Discharge Plan:   Diet Plan: Discussed  Activity Level: Discussed  Confirmed Follow up Appointment: Patient to Call and Schedule Appointment  Confirmed Symptoms Management: Discussed  Medication Reconciliation Updated: Yes  Influenza Vaccine Indication: Patient Refuses    I understand that a diet low in cholesterol, fat, and sodium is recommended for good health. Unless I have been given specific instructions below for another diet, I accept this instruction as my diet prescription.   Other diet: GI soft    Special Instructions: None    · Is patient discharged on Warfarin / Coumadin?   No     Depression / Suicide Risk    As you are discharged from this [facility] facility, it is important to learn how to keep safe from harming yourself.    Recognize the warning signs:  · Abrupt changes in personality, positive or negative- including increase in energy   · Giving away possessions  · Change in eating patterns- significant weight changes-  positive or negative  · Change in sleeping patterns- unable to sleep or sleeping all the time   · Unwillingness or inability to communicate  · Depression  · Unusual sadness, discouragement and loneliness  · Talk of wanting to die  · Neglect of personal appearance   · Rebelliousness- reckless behavior  · Withdrawal from people/activities they love  · Confusion- inability to concentrate     If you or a loved one observes any of these behaviors or has concerns about self-harm, here's what you can do:  · Talk about it- your feelings and reasons for harming yourself  · Remove any means that you might use to hurt yourself (examples: pills, rope, extension cords, firearm)  · Get professional help from the community  (Mental Health, Substance Abuse, psychological counseling)  · Do not be alone:Call your Safe Contact- someone whom you trust who will be there for you.  · Call your local CRISIS HOTLINE 861-2818 or 400-266-9652  · Call your local Children's Mobile Crisis Response Team [place] (302) 256-5376 or www.[place]  · Call the toll free National Suicide Prevention Hotlines   · National Suicide Prevention Lifeline 653-544-EQRQ (1939)  [place] [employer] [facility] 800-SUICIDE (182-0213)      Chronic Pancreatitis    Chronic pancreatitis is long-lasting inflammation and scarring of the pancreas. The pancreas is a gland that is located behind the stomach. It makes enzymes that help to digest food. The pancreas also releases hormones called glucagon and insulin, which help regulate blood sugar (glucose). Damage to the pancreas may affect digestion, cause pain in the upper abdomen and back, and cause diabetes. Inflammation can also irritate other organs in the abdomen near the pancreas.  At first, pancreatitis may be sudden (acute). If you have several or prolonged episodes of acute pancreatitis, the condition can turn into chronic pancreatitis.  What are the causes?  The most common cause of this condition is alcohol abuse. Other causes include:  · High (elevated) levels of triglycerides in the blood (hypertriglyceridemia).  · Gallstones or other conditions that can block the tube that drains the pancreas (pancreatic duct).  · Pancreatic cancer.  · Cystic fibrosis.  · Too much calcium in the blood (hypercalcemia), which may be caused by an overactive parathyroid gland (hyperparathyroidism).  · Certain medicines.  · Injury to the pancreas.  · Infection.  · Autoimmune pancreatitis. This is when the body's disease-fighting (immune) system attacks the pancreas.  · Genes that are passed from parent to child (inherited).  In some cases, the cause may not be known.  What increases the risk?  This condition is more likely to  develop in:  · Men.  · People who are 35-55 years old.  · People who have a family history of pancreatitis.  · People who smoke tobacco.  · People who drink large amounts of alcohol over a long period of time.  What are the signs or symptoms?  Symptoms of this condition may include:  · Pain in the abdomen or upper back. Pain may get worse after eating.  · Nausea and vomiting.  · Fever.  · Weight loss.  · A change in the color and consistency of bowel movements, such as stools that are oily, fatty, or hillary-colored.  How is this diagnosed?  This condition is diagnosed based on your symptoms, your medical history, and a physical exam. You may have tests, such as:  · Blood tests.  · Stool samples.  · Biopsy of the pancreas. This is the removal of a small amount of pancreas tissue to be tested in a lab.  · Imaging tests, such as:  ? X-rays.  ? CT scan.  ? MRI.  ? Ultrasound.  How is this treated?  You may need to be treated at a hospital. Treatment may involve:  · Resting the pancreas. You may need to stop eating and drinking for a few days to give your pancreas time to recover. During this time, you will be given IV fluids to keep you hydrated.  · Controlling pain. You may be given pain medicines by mouth (orally) or as injections.  · Improving digestion. You may be given:  ? Medicines to replace your pancreatic enzymes.  ? Vitamin supplements.  ? A specific diet to follow. You may work with a diet and nutrition specialist (dietitian) to make an eating plan.  · Surgery to:  ? Clear the pancreatic ducts of any blockages, such as gallstones.  ? Remove any fluid or damaged tissue from the pancreas.  Other treatments may include:  · Preventing diabetes. Your health care provider may recommend that you:  ? Get regular screening tests for diabetes.  ? Monitor your blood glucose regularly.  · Lifestyle changes, such as stopping alcohol use.  · Steroid medicines, if your condition is caused by your immune system attacking your  body's own tissues (autoimmune disease).  Follow these instructions at home:  Eating and drinking         · Do not drink alcohol. If you need help quitting, ask your health care provider.  · Follow a diet as told by your health care provider or dietitian, if this applies. This may include:  ? Limiting how much fat you eat.  ? Eating smaller meals more often.  ? Avoiding caffeine.  · Drink enough fluid to keep your urine pale yellow.  General instructions  · Take over-the-counter and prescription medicines only as told by your health care provider. These include vitamin supplements.  · Do not drive or use heavy machinery while taking prescription pain medicine.  · If you are taking prescription pain medicine, take actions to prevent or treat constipation. Your health care provider may recommend that you:  ? Take an over-the-counter or prescription medicine for constipation.  ? Eat foods that are high in fiber such as whole grains and beans.  ? Limit foods that are high in fat and processed sugars, such as fried or sweet foods.  · Do not use any products that contain nicotine or tobacco, such as cigarettes and e-cigarettes. If you need help quitting, ask your health care provider.  · If recommended by your health care provider, monitor your blood glucose at home.  · Keep all follow-up visits as told by your health care provider. This is important.  Contact a health care provider if:  · You have pain that does not get better with medicine.  · You have a fever.  · You have sudden weight loss.  Get help right away if:  · Your pain suddenly gets worse.  · You have sudden swelling in your abdomen.  · You start to vomit often.  · You vomit blood.  · You have diarrhea that does not go away.  · You have blood in your stool.  · You become confused or you have trouble thinking clearly.  Summary  · Chronic pancreatitis is long-lasting inflammation and scarring of the pancreas. Damage to the pancreas may affect digestion, cause  pain in the upper abdomen and back, and cause diabetes. Inflammation can also irritate other organs in the abdomen near the pancreas.  · Common causes of this condition are alcohol abuse, gallstones, high (elevated) levels of triglycerides, and certain medicines.  · This condition is sometimes treated at a hospital and may involve resting the pancreas, controlling pain, replacing enzymes, and avoiding alcohol.  This information is not intended to replace advice given to you by your health care provider. Make sure you discuss any questions you have with your health care provider.  Document Released: 01/13/2017 Document Revised: 10/07/2019 Document Reviewed: 08/17/2018  [employer] Patient Education © 2020 [employer]    Pancreatitis Eating Plan  Pancreatitis is when your pancreas becomes irritated and swollen (inflamed). The pancreas is a small organ located behind your stomach. It helps your body digest food and regulate your blood sugar. Pancreatitis can affect how your body digests food, especially foods with fat. You may also have other symptoms such as abdominal pain or nausea.  When you have pancreatitis, following a low-fat eating plan may help you manage symptoms and recover more quickly. Work with your health care provider or a diet and nutrition specialist (dietitian) to create an eating plan that is right for you.  What are tips for following this plan?  Reading food labels  Use the information on food labels to help keep track of how much fat you eat:  · Check the serving size.  · Look for the amount of total fat in grams (g) in one serving.  ? Low-fat foods have 3 g of fat or less per serving.  ? Fat-free foods have 0.5 g of fat or less per serving.  · Keep track of how much fat you eat based on how many servings you eat.  ? For example, if you eat two servings, the amount of fat you eat will be two times what is listed on the label.  Shopping    · Buy low-fat or nonfat foods, such as:  ? Fresh, frozen,  or canned fruits and vegetables.  ? Grains, including pasta, bread, and rice.  ? Lean meat, poultry, fish, and other protein foods.  ? Low-fat or nonfat dairy.  · Avoid buying bakery products and other sweets made with whole milk, butter, and eggs.  · Avoid buying snack foods with added fat, such as anything with butter or cheese flavoring.  Cooking  · Remove skin from poultry, and remove extra fat from meat.  · Limit the amount of fat and oil you use to 6 teaspoons or less per day.  · Cook using low-fat methods, such as boiling, broiling, grilling, steaming, or baking.  · Use spray oil to cook. Add fat-free chicken broth to add flavor and moisture.  · Avoid adding cream to thicken soups or sauces. Use other thickeners such as corn starch or tomato paste.  Meal planning    · Eat a low-fat diet as told by your dietitian. For most people, this means having no more than 55-65 grams of fat each day.  · Eat small, frequent meals throughout the day. For example, you may have 5-6 small meals instead of 3 large meals.  · Drink enough fluid to keep your urine pale yellow.  · Do not drink alcohol. Talk to your health care provider if you need help stopping.  · Limit how much caffeine you have, including black coffee, black and green tea, caffeinated soft drinks, and energy drinks.  General information  · Let your health care provider or dietitian know if you have unplanned weight loss on this eating plan.  · You may be instructed to follow a clear liquid diet during a flare of symptoms. Talk with your health care provider about how to manage your diet during symptoms of a flare.  · Take any vitamins or supplements as told by your health care provider.  · Work with a dietitian, especially if you have other conditions such as obesity or diabetes mellitus.  What foods should I avoid?  Fruits  Fried fruits. Fruits served with butter or cream.  Vegetables  Fried vegetables. Vegetables cooked with butter, cheese, or  cream.  Grains  Biscuits, waffles, donuts, pastries, and croissants. Pies and cookies. Butter-flavored popcorn. Regular crackers.  Meats and other protein foods  Fatty cuts of meat. Poultry with skin. Organ meats. [place], sausage, and cold cuts. Whole eggs. Nuts and nut butters.  Dairy  Whole and 2% milk. Whole milk yogurt. Whole milk ice cream. Cream and half-and-half. Cream cheese. Sour cream. Cheese.  Beverages  Wine, beer, and liquor.  The items listed above may not be a complete list of foods and beverages to avoid. Contact a dietitian for more information.  Summary  · Pancreatitis can affect how your body digests food, especially foods with fat.  · When you have pancreatitis, it is recommended that you follow a low-fat eating plan to help you recover more quickly and manage symptoms. For most people, this means limiting fat to no more than 55-65 grams per day.  · Do not drink alcohol. Limit the amount of caffeine you have, and drink enough fluid to keep your urine pale yellow.  This information is not intended to replace advice given to you by your health care provider. Make sure you discuss any questions you have with your health care provider.  Document Released: 03/26/2019 Document Revised: 04/09/2020 Document Reviewed: 03/26/2019  [employer] Patient Education © 2020 [employer]

## 2022-02-11 NOTE — CARE PLAN
"  Problem: Knowledge Deficit - Standard  Goal: Patient and family/care givers will demonstrate understanding of plan of care, disease process/condition, diagnostic tests and medications  Outcome: Progressing     Problem: Pain - Standard  Goal: Alleviation of pain or a reduction in pain to the patient’s comfort goal  Outcome: Progressing     Problem: Mobility  Goal: Patient's capacity to carry out activities will improve  Outcome: Progressing     Problem: Self Care  Goal: Patient will have the ability to perform ADLs independently or with assistance (bathe, groom, dress, toilet and feed)  Outcome: Progressing   The patient is Stable - Low risk of patient condition declining or worsening    Shift Goals  Clinical Goals: Rest, Advance Diet  Patient Goals: Rest  Family Goals: No family present    Progress made toward(s) clinical / shift goals:  Pt updated on POC and discharge paperwork, all questions answered. Pt denies any pain at this time. Pt instructed on pain medication and \"Controlled Substances Use Informed Consent\" signed and placed in pt chart. Work note provided to pt.     Patient is not progressing towards the following goals:      "

## 2022-02-16 ENCOUNTER — PATIENT OUTREACH (OUTPATIENT)
Dept: HEALTH INFORMATION MANAGEMENT | Facility: OTHER | Age: 37
End: 2022-02-16
Payer: MEDICAID

## 2022-02-16 NOTE — PROGRESS NOTES
SANDIE Saab followed up with the patient after DC from the hospital. Patient states that he is doing fine and states that he would like more financial resources for his significant others and the 6 children they care for. [facility] sent the patient information on [employer], [employer], and the family resource center. Patient has no other concerns at this time.     Community Health Worker Intake  • Social determinates of health intake complete.   • Identified barriers to financial security.  • Contact information provided to Leo Medellin   • Outpatient assessment completed.  • Did the patient receive medications post discharge: Yes    Plan: [facility]W will remove the patient from [facility] caseload.

## 2022-03-21 ENCOUNTER — HOSPITAL ENCOUNTER (EMERGENCY)
Facility: MEDICAL CENTER | Age: 37
End: 2022-03-21
Attending: EMERGENCY MEDICINE
Payer: MEDICAID

## 2022-03-21 ENCOUNTER — APPOINTMENT (OUTPATIENT)
Dept: RADIOLOGY | Facility: MEDICAL CENTER | Age: 37
End: 2022-03-21
Attending: EMERGENCY MEDICINE

## 2022-03-21 VITALS
BODY MASS INDEX: 31.8 KG/M2 | HEART RATE: 89 BPM | WEIGHT: 202.6 LBS | HEIGHT: 67 IN | SYSTOLIC BLOOD PRESSURE: 134 MMHG | TEMPERATURE: 98.1 F | OXYGEN SATURATION: 97 % | DIASTOLIC BLOOD PRESSURE: 80 MMHG | RESPIRATION RATE: 16 BRPM

## 2022-03-21 DIAGNOSIS — R73.9 HYPERGLYCEMIA: ICD-10-CM

## 2022-03-21 DIAGNOSIS — T07.XXXA ABRASIONS OF MULTIPLE SITES: ICD-10-CM

## 2022-03-21 LAB
ALBUMIN SERPL BCP-MCNC: 4.3 G/DL (ref 3.2–4.9)
ALBUMIN/GLOB SERPL: 1.5 G/DL
ALP SERPL-CCNC: 123 U/L (ref 30–99)
ALT SERPL-CCNC: 48 U/L (ref 2–50)
ANION GAP SERPL CALC-SCNC: 11 MMOL/L (ref 7–16)
APPEARANCE UR: CLEAR
AST SERPL-CCNC: 30 U/L (ref 12–45)
BASOPHILS # BLD AUTO: 0.7 % (ref 0–1.8)
BASOPHILS # BLD: 0.06 K/UL (ref 0–0.12)
BILIRUB SERPL-MCNC: 0.3 MG/DL (ref 0.1–1.5)
BILIRUB UR QL STRIP.AUTO: NEGATIVE
BUN SERPL-MCNC: 13 MG/DL (ref 8–22)
CALCIUM SERPL-MCNC: 9.3 MG/DL (ref 8.5–10.5)
CHLORIDE SERPL-SCNC: 108 MMOL/L (ref 96–112)
CO2 SERPL-SCNC: 21 MMOL/L (ref 20–33)
COLOR UR: YELLOW
CREAT SERPL-MCNC: 0.72 MG/DL (ref 0.5–1.4)
EKG IMPRESSION: NORMAL
EOSINOPHIL # BLD AUTO: 0.11 K/UL (ref 0–0.51)
EOSINOPHIL NFR BLD: 1.2 % (ref 0–6.9)
ERYTHROCYTE [DISTWIDTH] IN BLOOD BY AUTOMATED COUNT: 47.7 FL (ref 35.9–50)
GFR SERPLBLD CREATININE-BSD FMLA CKD-EPI: 121 ML/MIN/1.73 M 2
GLOBULIN SER CALC-MCNC: 2.8 G/DL (ref 1.9–3.5)
GLUCOSE BLD STRIP.AUTO-MCNC: 142 MG/DL (ref 65–99)
GLUCOSE SERPL-MCNC: 134 MG/DL (ref 65–99)
GLUCOSE UR STRIP.AUTO-MCNC: NEGATIVE MG/DL
HCT VFR BLD AUTO: 47.7 % (ref 42–52)
HGB BLD-MCNC: 15.6 G/DL (ref 14–18)
IMM GRANULOCYTES # BLD AUTO: 0.04 K/UL (ref 0–0.11)
IMM GRANULOCYTES NFR BLD AUTO: 0.4 % (ref 0–0.9)
KETONES UR STRIP.AUTO-MCNC: NEGATIVE MG/DL
LEUKOCYTE ESTERASE UR QL STRIP.AUTO: NEGATIVE
LIPASE SERPL-CCNC: 102 U/L (ref 11–82)
LYMPHOCYTES # BLD AUTO: 3.4 K/UL (ref 1–4.8)
LYMPHOCYTES NFR BLD: 37.1 % (ref 22–41)
MCH RBC QN AUTO: 29.7 PG (ref 27–33)
MCHC RBC AUTO-ENTMCNC: 32.7 G/DL (ref 33.7–35.3)
MCV RBC AUTO: 90.9 FL (ref 81.4–97.8)
MICRO URNS: NORMAL
MONOCYTES # BLD AUTO: 0.62 K/UL (ref 0–0.85)
MONOCYTES NFR BLD AUTO: 6.8 % (ref 0–13.4)
NEUTROPHILS # BLD AUTO: 4.94 K/UL (ref 1.82–7.42)
NEUTROPHILS NFR BLD: 53.8 % (ref 44–72)
NITRITE UR QL STRIP.AUTO: NEGATIVE
NRBC # BLD AUTO: 0 K/UL
NRBC BLD-RTO: 0 /100 WBC
PH UR STRIP.AUTO: 5.5 [PH] (ref 5–8)
PLATELET # BLD AUTO: 254 K/UL (ref 164–446)
PMV BLD AUTO: 11.7 FL (ref 9–12.9)
POTASSIUM SERPL-SCNC: 4.2 MMOL/L (ref 3.6–5.5)
PROT SERPL-MCNC: 7.1 G/DL (ref 6–8.2)
PROT UR QL STRIP: NEGATIVE MG/DL
RBC # BLD AUTO: 5.25 M/UL (ref 4.7–6.1)
RBC UR QL AUTO: NEGATIVE
SODIUM SERPL-SCNC: 140 MMOL/L (ref 135–145)
SP GR UR STRIP.AUTO: 1.02
TROPONIN T SERPL-MCNC: <6 NG/L (ref 6–19)
UROBILINOGEN UR STRIP.AUTO-MCNC: 0.2 MG/DL
WBC # BLD AUTO: 9.2 K/UL (ref 4.8–10.8)

## 2022-03-21 PROCEDURE — 85025 COMPLETE CBC W/AUTO DIFF WBC: CPT

## 2022-03-21 PROCEDURE — 82962 GLUCOSE BLOOD TEST: CPT

## 2022-03-21 PROCEDURE — 93005 ELECTROCARDIOGRAM TRACING: CPT | Performed by: EMERGENCY MEDICINE

## 2022-03-21 PROCEDURE — 71045 X-RAY EXAM CHEST 1 VIEW: CPT

## 2022-03-21 PROCEDURE — 99284 EMERGENCY DEPT VISIT MOD MDM: CPT

## 2022-03-21 PROCEDURE — 83690 ASSAY OF LIPASE: CPT

## 2022-03-21 PROCEDURE — 80053 COMPREHEN METABOLIC PANEL: CPT

## 2022-03-21 PROCEDURE — 81003 URINALYSIS AUTO W/O SCOPE: CPT

## 2022-03-21 PROCEDURE — 36415 COLL VENOUS BLD VENIPUNCTURE: CPT

## 2022-03-21 PROCEDURE — 84484 ASSAY OF TROPONIN QUANT: CPT

## 2022-03-21 PROCEDURE — 93005 ELECTROCARDIOGRAM TRACING: CPT

## 2022-03-21 ASSESSMENT — FIBROSIS 4 INDEX: FIB4 SCORE: 0.47

## 2022-03-21 NOTE — Clinical Note
Leo Medellin was seen and treated in our emergency department on 3/21/2022.  He may return to work on 03/22/2022.       If you have any questions or concerns, please don't hesitate to call.      Urban Loya M.D.

## 2022-03-21 NOTE — ED PROVIDER NOTES
ED Provider Note    CHIEF COMPLAINT  Chief Complaint   Patient presents with   • Hyperglycemia     Pt reports hyperglycemia last night- reports sugar 320s, reports blood sugar this AM 130s.    • Chest Pain     Pt reports L sided CP last 2 nights, pt denies pain this morning.   • Blurred Vision     Pt reports L eye blurry vision. Since last night.       HPI  Leo Medellin is a 36 y.o. male who presents to the emergency department with several complaints.  He has not felt terribly well over the last few days.  He has had some off-and-on nausea that he relates to a history of gastritis.  He has had loose stool.  The last 2 nights he has had a few minute episode of pinching left upper chest pain.  This comes on out of the blue and goes away without intervention.  No associated shortness of breath.  No leg swelling or leg pain.  He has had some off-and-on abdominal pain located in the left upper abdomen.  He has history of gastritis and has not been taking his Prilosec.  He has a history of pancreatitis and is worried that this may be a recurrent problem.  Last night he woke up.  He had to urinate frequently.  He checked his blood sugar and it was 320.  He was able to go back to sleep and did not take any medications to bring it down.  When he woke up this morning he had blurriness in his left eye that has gradually been improving over the course of 2 hours.  Now basically resolved.  Currently his only complaint is of upper abdominal pain.  He has not had fevers or chills.    REVIEW OF SYSTEMS  As per HPI, otherwise a 10 point review of systems is negative    PAST MEDICAL HISTORY  Past Medical History:   Diagnosis Date   • Alcohol abuse    • Alcohol abuse     5-10 beers   • Alcoholic cirrhosis (HCC)    • Anxiety    • Asthma    • Depression    • ETOH abuse    • Gastritis    • Hypertension    • Liver failure (HCC)    • Pancreatitis    • Ulcer     unsure if abdominal/intestinal       SOCIAL HISTORY  Social History  "    Tobacco Use   • Smoking status: Former Smoker     Types: Cigarettes   • Smokeless tobacco: Never Used   Vaping Use   • Vaping Use: Every day   • Substances: THC   Substance Use Topics   • Alcohol use: Not Currently     Comment: 4/7/2021 quit   • Drug use: Yes     Types: Inhaled     Comment: lianne daily       SURGICAL HISTORY  Past Surgical History:   Procedure Laterality Date   • APPENDECTOMY     • [place] APPENDECTOMY         CURRENT MEDICATIONS  Home Medications     Reviewed by Tania Wharton R.N. (Registered Nurse) on 03/21/22 at 0700  Med List Status: <None>   Medication Last Dose Status   amLODIPine (NORVASC) 10 MG Tab  Active   atorvastatin (LIPITOR) 40 MG Tab  Active   DULoxetine (CYMBALTA) 60 MG Cap DR Particles delayed-release capsule  Active   lisinopril (PRINIVIL) 40 MG tablet  Active   metFORMIN ER (GLUCOPHAGE XR) 500 MG TABLET SR 24 HR  Active   metoprolol SR (TOPROL XL) 25 MG TABLET SR 24 HR  Active                ALLERGIES  No Known Allergies    PHYSICAL EXAM  VITAL SIGNS: /90   Pulse 90   Temp 36.6 °C (97.8 °F) (Temporal)   Resp 16   Ht 1.702 m (5' 7\")   Wt 91.9 kg (202 lb 9.6 oz)   SpO2 96%   BMI 31.73 kg/m²    Constitutional: Awake and alert  HENT: Normal inspection  Eyes: Normal inspection  Neck: Grossly normal range of motion.  Cardiovascular: Normal heart rate, Normal rhythm.  Symmetric peripheral pulses.   Thorax & Lungs: No respiratory distress, No wheezing, No rales, No rhonchi, No chest tenderness.   Abdomen: Bowel sounds normal, soft, non-distended, nontender, no mass  Skin: No obvious rash.  Back: No tenderness, No CVA tenderness.   Extremities: No clubbing, cyanosis, edema, no Homans or cords.  Neurologic: Grossly normal   Psychiatric: Normal for situation    RADIOLOGY/PROCEDURES  DX-CHEST-PORTABLE (1 VIEW)   Final Result      No acute cardiac or pulmonary abnormalities are identified.           Imaging is interpreted by radiologist    Labs:  Results for orders placed or " performed during the hospital encounter of 03/21/22   CBC with Differential   Result Value Ref Range    WBC 9.2 4.8 - 10.8 K/uL    RBC 5.25 4.70 - 6.10 M/uL    Hemoglobin 15.6 14.0 - 18.0 g/dL    Hematocrit 47.7 42.0 - 52.0 %    MCV 90.9 81.4 - 97.8 fL    MCH 29.7 27.0 - 33.0 pg    MCHC 32.7 (L) 33.7 - 35.3 g/dL    RDW 47.7 35.9 - 50.0 fL    Platelet Count 254 164 - 446 K/uL    MPV 11.7 9.0 - 12.9 fL    Neutrophils-Polys 53.80 44.00 - 72.00 %    Lymphocytes 37.10 22.00 - 41.00 %    Monocytes 6.80 0.00 - 13.40 %    Eosinophils 1.20 0.00 - 6.90 %    Basophils 0.70 0.00 - 1.80 %    Immature Granulocytes 0.40 0.00 - 0.90 %    Nucleated RBC 0.00 /100 WBC    Neutrophils (Absolute) 4.94 1.82 - 7.42 K/uL    Lymphs (Absolute) 3.40 1.00 - 4.80 K/uL    Monos (Absolute) 0.62 0.00 - 0.85 K/uL    Eos (Absolute) 0.11 0.00 - 0.51 K/uL    Baso (Absolute) 0.06 0.00 - 0.12 K/uL    Immature Granulocytes (abs) 0.04 0.00 - 0.11 K/uL    NRBC (Absolute) 0.00 K/uL   Complete Metabolic Panel (CMP)   Result Value Ref Range    Sodium 140 135 - 145 mmol/L    Potassium 4.2 3.6 - 5.5 mmol/L    Chloride 108 96 - 112 mmol/L    Co2 21 20 - 33 mmol/L    Anion Gap 11.0 7.0 - 16.0    Glucose 134 (H) 65 - 99 mg/dL    Bun 13 8 - 22 mg/dL    Creatinine 0.72 0.50 - 1.40 mg/dL    Calcium 9.3 8.5 - 10.5 mg/dL    AST(SGOT) 30 12 - 45 U/L    ALT(SGPT) 48 2 - 50 U/L    Alkaline Phosphatase 123 (H) 30 - 99 U/L    Total Bilirubin 0.3 0.1 - 1.5 mg/dL    Albumin 4.3 3.2 - 4.9 g/dL    Total Protein 7.1 6.0 - 8.2 g/dL    Globulin 2.8 1.9 - 3.5 g/dL    A-G Ratio 1.5 g/dL   Troponin   Result Value Ref Range    Troponin T <6 6 - 19 ng/L   URINALYSIS CULTURE, IF INDICATED    Specimen: Urine   Result Value Ref Range    Color Yellow     Character Clear     Specific Gravity 1.021 <1.035    Ph 5.5 5.0 - 8.0    Glucose Negative Negative mg/dL    Ketones Negative Negative mg/dL    Protein Negative Negative mg/dL    Bilirubin Negative Negative    Urobilinogen, Urine 0.2  Negative    Nitrite Negative Negative    Leukocyte Esterase Negative Negative    Occult Blood Negative Negative    Micro Urine Req see below    LIPASE   Result Value Ref Range    Lipase 102 (H) 11 - 82 U/L   ESTIMATED GFR   Result Value Ref Range    GFR (CKD-EPI) 121 >60 mL/min/1.73 m 2   EKG   Result Value Ref Range    Report       [facility]    Test Date:  2022  Pt Name:    JACKIE SMITH          Department: ER  MRN:        [id number]                      Room:  Gender:     Male                         Technician: [id number]  :        1985                   Requested By:ER TRIAGE PROTOCOL  Order #:    [id number]                    Reading MD: Urban Loya    Measurements  Intervals                                Axis  Rate:       80                           P:          34  CA:         124                          QRS:        -27  QRSD:       82                           T:          76  QT:         356  QTc:        411    Interpretive Statements  SINUS RHYTHM  LEFT VENTRICULAR HYPERTROPHY  Compared to ECG 11/15/2021 06:35:39  Left ventricular hypertrophy now present  ST (T wave) deviation no longer present     POCT glucose device results   Result Value Ref Range    POC Glucose, Blood 142 (H) 65 - 99 mg/dL         COURSE & MEDICAL DECISION MAKING  Patient presents to the emergency department with several complaints as described above.  He had chest pain.  His initial EKG is without evidence of ischemia.  No clinical suggestion of dissection or pulmonary embolism.  Negative for PE by PERC criteria.  We will send troponin.  Obtain chest x-ray.  Has left upper quadrant abdominal discomfort worried about the possibility of pancreatitis.  Will obtain laboratory data.  His symptoms have largely resolved at this time.    X-ray is negative for infiltrate or acute abnormalities.  CBC is normal.  CMP with minimal/insignificant elevation of the ALP.  Similar insignificant  elevation of lipase.  Does not have pancreatitis.  May has some residual discomfort from previous episode of pancreatitis.  His troponin is negative.    At this point I suspect most likely his abdominal pain is related to gastritis and noncompliance.  Chest pain could be esophagitis I do not see any suggestion of ACS or emergency pathology.  We will have him restart his Prilosec daily and watch his diet.  No alcohol.  He later reported that he did have some trauma to his chest wall and thinks that may be contributing.  Advised Tylenol and abstain from NSAIDs.  He has had little vision in his left eye that is resolved.  I do not see any suggestion of stroke or emergency ocular condition.  He does not have any eye pain or objective findings on exam. I have advised that he follow-up with ophthalmology.  I would like patient to follow-up with his primary provider within 1 week.  Return to ER for difficulty breathing, worsening symptoms, not improving or concern    FINAL IMPRESSION  1.  Chest pain-possible esophagitis versus musculoskeletal  2.  Left upper quadrant abdominal pain-gastritis  3.  Visual disturbance, left eye      This dictation was created using voice recognition software. The accuracy of the dictation is limited to the abilities of the software.  The nursing notes were reviewed and certain aspects of this information were incorporated into this note.      Electronically signed by: Urban Loya M.D., 3/21/2022 8:01 AM

## 2022-03-21 NOTE — ED TRIAGE NOTES
"Chief Complaint   Patient presents with   • Hyperglycemia     Pt reports hyperglycemia last night- reports sugar 320s, reports blood sugar this AM 130s.    • Chest Pain     Pt reports L sided CP last 2 nights, pt denies pain this morning.   • Blurred Vision     Pt reports L eye blurry vision. Since last night.       Pt to triage with steady gait for above complaint.     Pt presents in triage GCS 15, denies headache or weakness, pt denies current CP. FSBG in triage- 142    Pt back to [facility], educated on triage process and encourage to alert staff of any changes.     /90   Pulse 90   Temp 36.6 °C (97.8 °F) (Temporal)   Resp 16   Ht 1.702 m (5' 7\")   Wt 91.9 kg (202 lb 9.6 oz)   SpO2 96%   BMI 31.73 kg/m²     "

## 2022-03-31 ENCOUNTER — HOSPITAL ENCOUNTER (EMERGENCY)
Facility: MEDICAL CENTER | Age: 37
End: 2022-04-01
Attending: EMERGENCY MEDICINE
Payer: MEDICAID

## 2022-03-31 DIAGNOSIS — L02.91 ABSCESS: ICD-10-CM

## 2022-03-31 PROCEDURE — 99283 EMERGENCY DEPT VISIT LOW MDM: CPT

## 2022-03-31 ASSESSMENT — FIBROSIS 4 INDEX: FIB4 SCORE: 0.63

## 2022-04-01 VITALS
RESPIRATION RATE: 18 BRPM | WEIGHT: 201.28 LBS | SYSTOLIC BLOOD PRESSURE: 128 MMHG | BODY MASS INDEX: 31.59 KG/M2 | DIASTOLIC BLOOD PRESSURE: 80 MMHG | TEMPERATURE: 98.4 F | HEIGHT: 67 IN | OXYGEN SATURATION: 94 % | HEART RATE: 100 BPM

## 2022-04-01 PROCEDURE — 700102 HCHG RX REV CODE 250 W/ 637 OVERRIDE(OP): Performed by: EMERGENCY MEDICINE

## 2022-04-01 PROCEDURE — A9270 NON-COVERED ITEM OR SERVICE: HCPCS | Performed by: EMERGENCY MEDICINE

## 2022-04-01 RX ORDER — SULFAMETHOXAZOLE AND TRIMETHOPRIM 800; 160 MG/1; MG/1
1 TABLET ORAL 2 TIMES DAILY
Qty: 10 TABLET | Refills: 0 | Status: SHIPPED | OUTPATIENT
Start: 2022-04-01 | End: 2022-04-06

## 2022-04-01 RX ORDER — SULFAMETHOXAZOLE AND TRIMETHOPRIM 800; 160 MG/1; MG/1
1 TABLET ORAL ONCE
Status: COMPLETED | OUTPATIENT
Start: 2022-04-01 | End: 2022-04-01

## 2022-04-01 RX ADMIN — SULFAMETHOXAZOLE AND TRIMETHOPRIM 1 TABLET: 800; 160 TABLET ORAL at 00:36

## 2022-04-01 NOTE — ED TRIAGE NOTES
"Chief Complaint   Patient presents with   • Abscess     Pt reports groin abscess that \"popped\" an hour PTA in shower with white discharge.       Pt to triage with steady gait for above complaint.     Pt presents in triage with abscess to groin, pt has hx of DM and is worried about infection.     Pt back to lobby, educated on triage process and encourage to alert staff of any changes.     /82   Pulse (!) 120   Temp 36.8 °C (98.3 °F) (Temporal)   Resp 18   Ht 1.702 m (5' 7\")   Wt 91.3 kg (201 lb 4.5 oz)   SpO2 95%   BMI 31.52 kg/m²     "

## 2022-04-01 NOTE — ED PROVIDER NOTES
"ED Provider Note    Scribed for Topher Marcus M.D. by Rosie Sarabia. 4/1/2022,  12:20 AM.    Means of Arrival: Walk in   History obtained from: Patient  History limited by: None    CHIEF COMPLAINT  Chief Complaint   Patient presents with    Abscess     Pt reports groin abscess that \"popped\" an hour PTA in shower with white discharge.       HPI  Leo Medellin is a 36 y.o. male with hypertension, hyperlipidemia, and diabetes who presents to the Emergency Department for evaluation of groin abscess onset three days ago. The patient reports that he typically gets abscesses in his armpit, but not near his groin. He states that it popped an hour ago in the shower; a lot of white discharge was released. He admits to associated symptoms of non worsening fever, but denies chills. No alleviating factors were reported. The patient has been sick for a week; he had a COVID test done on Monday that returned negative.       REVIEW OF SYSTEMS  CONSTITUTIONAL:  Fever.  CARDIOVASCULAR:  No chest discomfort.  RESPIRATORY:  No pleuritic chest pain.  GASTROINTESTINAL:  No abdominal pain.  GENITOURINARY:   Groin abscess. No dysuria.  MUSCULOSKELETAL:  No arthralgia.  SKIN:  No rash or suspicious lesions.  NEUROLOGIC:   No headache.  See HPI for further details.       PAST MEDICAL HISTORY  Past Medical History:   Diagnosis Date    Alcohol abuse     Alcohol abuse     5-10 beers    Alcoholic cirrhosis (HCC)     Anxiety     Asthma     Depression     ETOH abuse     Gastritis     Hypertension     Liver failure (HCC)     Pancreatitis     Ulcer     unsure if abdominal/intestinal       FAMILY HISTORY  Family History   Problem Relation Age of Onset    Diabetes Brother     Hypertension Mother        SOCIAL HISTORY   reports that he has quit smoking. His smoking use included cigarettes. He has never used smokeless tobacco. He reports previous alcohol use. He reports current drug use. Drug: Inhaled.    SURGICAL HISTORY  Past Surgical History: " "  Procedure Laterality Date    APPENDECTOMY      [place] APPENDECTOMY         CURRENT MEDICATIONS  Home Medications       Reviewed by Tania Wharton R.N. (Registered Nurse) on 03/31/22 at 2251  Med List Status: <None>     Medication Last Dose Status   amLODIPine (NORVASC) 10 MG Tab  Active   atorvastatin (LIPITOR) 40 MG Tab  Active   DULoxetine (CYMBALTA) 60 MG Cap DR Particles delayed-release capsule  Active   lisinopril (PRINIVIL) 40 MG tablet  Active   metFORMIN ER (GLUCOPHAGE XR) 500 MG TABLET SR 24 HR  Active   metoprolol SR (TOPROL XL) 25 MG TABLET SR 24 HR  Active                    ALLERGIES  No Known Allergies    PHYSICAL EXAM  VITAL SIGNS: /80   Pulse 100   Temp 36.8 °C (98.3 °F) (Temporal)   Resp 18   Ht 1.702 m (5' 7\")   Wt 91.3 kg (201 lb 4.5 oz)   SpO2 94%   BMI 31.52 kg/m²    Gen: alert, no acute distress  HENT: ATNC  Eyes: normal conjuctiva  Resp: No resipiratory distress.   CV: No JVD   Skin: Spontaneously draining abscess with some induration and surrounding erythema to the left lower abdomen just below the belt line.   Abd: Non-distended. Soft. Non-tender.   Extremities: No deformity      COURSE & MEDICAL DECISION MAKING  Pertinent Labs & Imaging studies reviewed. (See chart for details)    12:20 AM Patient seen and examined at bedside. I discussed plan for discharge and follow up as outlined below. The patient is stable for discharge at this time and will return for any new or worsening symptoms. Patient verbalizes understanding and support with my plan for discharge.       Medical Decision Making:  Patient presents with just below the belt line.  There is some slight surrounding erythema concerning for cellulitis.  He will be started antibiotics for this.  Given the patient's abscess is spontaneously draining, I do not believe he require further I&D.  He was given return precautions, anticipatory guidance.     The patient will return for new or worsening symptoms and is stable at the " time of discharge.    The patient is referred to a primary physician for blood pressure management, diabetic screening, and for all other preventative health concerns.      DISPOSITION:  Patient will be discharged home in stable condition.    FOLLOW UP:  CRIST[facility]  [address] [facility] [address]  [phone]    Schedule an appointment as soon as possible for a visit       [facility], Emergency Dept  [address] [facility] [id number] [phone]    If symptoms worsen      OUTPATIENT MEDICATIONS:  Discharge Medication List as of 4/1/2022 12:39 AM        START taking these medications    Details   sulfamethoxazole-trimethoprim (BACTRIM DS) 800-160 MG tablet Take 1 Tablet by mouth 2 times a day for 5 days., Disp-10 Tablet, R-0, Normal             FINAL IMPRESSION  1. Abscess         Rosie GONZALEZ (Scribe), am scribing for, and in the presence of, Topher Marcus M.D..    Electronically signed by: Rosie Sarabia ([email or web address]), 4/1/2022    Topher GONZALEZ M.D. personally performed the services described in this documentation, as scribed by Rosie Sarabia in my presence, and it is both accurate and complete.    The note accurately reflects work and decisions made by me.  Topher Marcus M.D.  4/1/2022  1:49 AM      This dictation was created using voice recognition software. The accuracy of the dictation is limited to the abilities of the software. I expect there may be some errors of grammar and possibly content. The nursing notes were reviewed and certain aspects of this information were incorporated into this note.

## 2022-04-01 NOTE — ED NOTES
Patient educated on discharge instructions, follow up appointments, prescriptions, and home care. Patient verbalized understanding. Patient ambulated well to [facility].

## 2022-04-01 NOTE — DISCHARGE INSTRUCTIONS
You were seen in the emergency department for an abscess. This was already draining.  Please encourage you to continue draining by using hot compresses and massaging out any pus.  Please keep the area clean. Please wash several times a day with soap and water.  Please do not use any harsh chemicals, such as alcohol or hydrogen peroxide.     You are being sent home on a course of antibiotics. Please take these as prescribed. Please followup with your regular doctor.    Call your doctor or return to the ER:   -If you develop fevers, chills, worsening pain, spreading redness or other concerning symptoms

## 2022-06-16 ENCOUNTER — HOSPITAL ENCOUNTER (EMERGENCY)
Facility: MEDICAL CENTER | Age: 37
End: 2022-06-16
Attending: EMERGENCY MEDICINE
Payer: MEDICAID

## 2022-06-16 ENCOUNTER — APPOINTMENT (OUTPATIENT)
Dept: RADIOLOGY | Facility: MEDICAL CENTER | Age: 37
End: 2022-06-16
Attending: EMERGENCY MEDICINE

## 2022-06-16 VITALS
SYSTOLIC BLOOD PRESSURE: 143 MMHG | BODY MASS INDEX: 30.97 KG/M2 | TEMPERATURE: 98.1 F | HEIGHT: 67 IN | HEART RATE: 74 BPM | DIASTOLIC BLOOD PRESSURE: 96 MMHG | OXYGEN SATURATION: 99 % | WEIGHT: 197.31 LBS | RESPIRATION RATE: 18 BRPM

## 2022-06-16 DIAGNOSIS — R07.89 OTHER CHEST PAIN: ICD-10-CM

## 2022-06-16 LAB
ALBUMIN SERPL BCP-MCNC: 4.3 G/DL (ref 3.2–4.9)
ALBUMIN/GLOB SERPL: 1.3 G/DL
ALP SERPL-CCNC: 128 U/L (ref 30–99)
ALT SERPL-CCNC: 50 U/L (ref 2–50)
ANION GAP SERPL CALC-SCNC: 13 MMOL/L (ref 7–16)
AST SERPL-CCNC: 34 U/L (ref 12–45)
BASOPHILS # BLD AUTO: 0.4 % (ref 0–1.8)
BASOPHILS # BLD: 0.04 K/UL (ref 0–0.12)
BILIRUB SERPL-MCNC: 0.3 MG/DL (ref 0.1–1.5)
BUN SERPL-MCNC: 11 MG/DL (ref 8–22)
CALCIUM SERPL-MCNC: 8.9 MG/DL (ref 8.5–10.5)
CHLORIDE SERPL-SCNC: 106 MMOL/L (ref 96–112)
CO2 SERPL-SCNC: 20 MMOL/L (ref 20–33)
CREAT SERPL-MCNC: 0.76 MG/DL (ref 0.5–1.4)
EKG IMPRESSION: NORMAL
EOSINOPHIL # BLD AUTO: 0.17 K/UL (ref 0–0.51)
EOSINOPHIL NFR BLD: 1.8 % (ref 0–6.9)
ERYTHROCYTE [DISTWIDTH] IN BLOOD BY AUTOMATED COUNT: 46.5 FL (ref 35.9–50)
GFR SERPLBLD CREATININE-BSD FMLA CKD-EPI: 118 ML/MIN/1.73 M 2
GLOBULIN SER CALC-MCNC: 3.2 G/DL (ref 1.9–3.5)
GLUCOSE SERPL-MCNC: 141 MG/DL (ref 65–99)
HCT VFR BLD AUTO: 49.2 % (ref 42–52)
HGB BLD-MCNC: 16.4 G/DL (ref 14–18)
IMM GRANULOCYTES # BLD AUTO: 0.06 K/UL (ref 0–0.11)
IMM GRANULOCYTES NFR BLD AUTO: 0.6 % (ref 0–0.9)
LYMPHOCYTES # BLD AUTO: 3.55 K/UL (ref 1–4.8)
LYMPHOCYTES NFR BLD: 36.8 % (ref 22–41)
MCH RBC QN AUTO: 29.7 PG (ref 27–33)
MCHC RBC AUTO-ENTMCNC: 33.3 G/DL (ref 33.7–35.3)
MCV RBC AUTO: 89 FL (ref 81.4–97.8)
MONOCYTES # BLD AUTO: 0.69 K/UL (ref 0–0.85)
MONOCYTES NFR BLD AUTO: 7.2 % (ref 0–13.4)
NEUTROPHILS # BLD AUTO: 5.13 K/UL (ref 1.82–7.42)
NEUTROPHILS NFR BLD: 53.2 % (ref 44–72)
NRBC # BLD AUTO: 0 K/UL
NRBC BLD-RTO: 0 /100 WBC
PLATELET # BLD AUTO: 279 K/UL (ref 164–446)
PMV BLD AUTO: 11.3 FL (ref 9–12.9)
POTASSIUM SERPL-SCNC: 4.3 MMOL/L (ref 3.6–5.5)
PROT SERPL-MCNC: 7.5 G/DL (ref 6–8.2)
RBC # BLD AUTO: 5.53 M/UL (ref 4.7–6.1)
SODIUM SERPL-SCNC: 139 MMOL/L (ref 135–145)
TROPONIN T SERPL-MCNC: <6 NG/L (ref 6–19)
WBC # BLD AUTO: 9.6 K/UL (ref 4.8–10.8)

## 2022-06-16 PROCEDURE — 84484 ASSAY OF TROPONIN QUANT: CPT

## 2022-06-16 PROCEDURE — 93005 ELECTROCARDIOGRAM TRACING: CPT | Performed by: EMERGENCY MEDICINE

## 2022-06-16 PROCEDURE — 85025 COMPLETE CBC W/AUTO DIFF WBC: CPT

## 2022-06-16 PROCEDURE — 36415 COLL VENOUS BLD VENIPUNCTURE: CPT

## 2022-06-16 PROCEDURE — 99283 EMERGENCY DEPT VISIT LOW MDM: CPT

## 2022-06-16 PROCEDURE — 93005 ELECTROCARDIOGRAM TRACING: CPT

## 2022-06-16 PROCEDURE — 80053 COMPREHEN METABOLIC PANEL: CPT

## 2022-06-16 PROCEDURE — 71045 X-RAY EXAM CHEST 1 VIEW: CPT

## 2022-06-16 ASSESSMENT — FIBROSIS 4 INDEX: FIB4 SCORE: 0.63

## 2022-06-16 NOTE — DISCHARGE INSTRUCTIONS
Like we talked about, I am not sure what is causing your symptoms today.  However, I think it is safe to go home today and follow-up with your primary care doctor.  Continue with exercise and dietary changes as you are doing.  I do think you should restart your Prilosec as well as this could be contributing.    As we discussed, should you develop any new symptoms or take a turn for the worse, come back here so we may recheck you.

## 2022-06-16 NOTE — ED PROVIDER NOTES
ED Provider Note    CHIEF COMPLAINT  Chief Complaint   Patient presents with   • Chest Pain       HPI  Leo Medellin is a 36 y.o. male who presents complaint of pain in his chest.  Patient started having symptoms yesterday.  He has an achy sensation in the right shoulder/chest which radiates to the right anterior arm distally.  And today started having some achiness in his right leg.  It got worse.  That now feels worse than his arm pain.  The chest is more of a pressure type sensation.  Not pleuritic.  Both of these feel like back in the old days when he would get intoxicated and sleep abnormally, waking up with the symptoms.  However this did not occur related to sleep, and he does not any longer drink.  Denies any exercise symptoms.  There is no leg pain or swelling.  The pain in his legs over the anterior thigh.  No trips or travel.  No cough.  Does not endorse shortness of breath to me.  There is no change in bowel or bladder.  He has ongoing abdominal issues, nothing new here.  No change in bowel or bladder.  He has been off of his Prilosec for about 10 days.  He does not get his prescription refilled.  This does not feel like pancreatitis which he has had in the past.  He denies any weakness.  No vision or speech changes.  No facial symptoms.  There is no other complaint.  From the record, abnormal echo a year ago, had a unremarkable thallium 4 years ago.    PAST MEDICAL HISTORY  Past Medical History:   Diagnosis Date   • Alcohol abuse    • Alcohol abuse     5-10 beers   • Alcoholic cirrhosis (HCC)    • Anxiety    • Asthma    • Depression    • ETOH abuse    • Gastritis    • Hypertension    • Liver failure (HCC)    • Pancreatitis    • Ulcer     unsure if abdominal/intestinal       FAMILY HISTORY  Family History   Problem Relation Age of Onset   • Diabetes Brother    • Hypertension Mother    No CAD.    SOCIAL HISTORY  Social History     Tobacco Use   • Smoking status: Former Smoker     Types: Cigarettes  "  • Smokeless tobacco: Never Used   Vaping Use   • Vaping Use: Every day   • Substances: THC   Substance Use Topics   • Alcohol use: Not Currently     Comment: 4/7/2021 quit   • Drug use: Yes     Types: Inhaled     Comment: marajuana daily     No illicit drugs.    SURGICAL HISTORY  Past Surgical History:   Procedure Laterality Date   • APPENDECTOMY     • LA APPENDECTOMY         CURRENT MEDICATIONS  Home Medications     Reviewed by Jen Cole R.N. (Registered Nurse) on 06/16/22 at 0605  Med List Status: Not Addressed   Medication Last Dose Status   amLODIPine (NORVASC) 10 MG Tab  Active   atorvastatin (LIPITOR) 40 MG Tab  Active   DULoxetine (CYMBALTA) 60 MG Cap DR Particles delayed-release capsule  Active   lisinopril (PRINIVIL) 40 MG tablet  Active   metFORMIN ER (GLUCOPHAGE XR) 500 MG TABLET SR 24 HR  Active   metoprolol SR (TOPROL XL) 25 MG TABLET SR 24 HR  Active                I have reviewed the nurses notes and/or the list brought with the patient.    ALLERGIES  No Known Allergies    REVIEW OF SYSTEMS  See HPI for further details. Review of systems as above, otherwise all other systems are negative.     PHYSICAL EXAM  VITAL SIGNS: /66   Pulse 68   Temp 36.6 °C (97.8 °F) (Temporal)   Resp 18   Ht 1.702 m (5' 7\")   Wt 89.5 kg (197 lb 5 oz)   SpO2 95% Comment: Room air  BMI 30.90 kg/m²     Constitutional: Well appearing patient in no acute distress.  Not toxic, nor ill in appearance.  HENT: Mucus membranes moist.  Oropharynx is clear.  Eyes: Pupils equally round.  No scleral icterus.   Neck: Full nontender range of motion.  Lymphatic: No cervical lymphadenopathy noted.   Cardiovascular: Regular heart rate and rhythm.  No murmurs, rubs, nor gallop appreciated.   Thorax & Lungs: Chest is nontender.  Lungs are clear to auscultation with good air movement bilaterally.  No wheeze, rhonchi, nor rales.   Abdomen: Soft, with no tenderness, rebound nor guarding.  No mass, pulsatile mass, nor " hepatosplenomegaly appreciated.  Skin: No purpura nor petechia noted.  Extremities/Musculoskeletal: No sign of trauma.  Calves are nontender with no cords nor edema.  No Alcides's sign.  Pulses are intact all around.   Neurologic: Alert & oriented.  Strength and sensation is intact all around.  Gait is normal.  No dysmetria.  Cranial nerves normal.  Psychiatric: Normal affect appropriate for the clinical situation.    EKG  I interpreted this EKG myself.  This is a 12-lead study.  The rhythm is sinus with a rate of 81.  There are no ST segment nor T wave abnormalities.  Interpretation: No ST segment elevation myocardial infarction.  Similar to March.    LABS  Labs Reviewed   CBC WITH DIFFERENTIAL - Abnormal; Notable for the following components:       Result Value    MCHC 33.3 (*)     All other components within normal limits   COMP METABOLIC PANEL - Abnormal; Notable for the following components:    Glucose 141 (*)     Alkaline Phosphatase 128 (*)     All other components within normal limits   TROPONIN   ESTIMATED GFR     DX-CHEST-PORTABLE (1 VIEW)   Final Result      No acute cardiac or pulmonary abnormalities are identified.            RADIOLOGY/PROCEDURES  I have reviewed the patient's film interpretations myself, and they are read out by the radiologist as:   DX-CHEST-PORTABLE (1 VIEW)   Final Result      No acute cardiac or pulmonary abnormalities are identified.        .    MEDICAL RECORD  I have reviewed patient's medical record and pertinent results are listed above.    COURSE & MEDICAL DECISION MAKING  I have reviewed any medical record information, laboratory studies and radiographic results as noted above.  This patient presents with some chest pain more so in the right chest.  He has negative for pulm embolism by the PE rule out criteria.  His heart score is low risk, getting 2 points for his risk factors of dyslipidemia, diabetes and hypertension.  However, this does not seem to be cardiac in etiology  and has a negative troponin with a days worth of symptoms.  EKG is unchanged.  This does not sound like aortic etiology.  With the findings in his right hip, consideration for CNS process.  However, he has a normal neurological exam.  Chest x-ray shows no evidence of an infiltrate, pleural effusion.  This point, I discussed with him I do not have an etiology for her symptoms but I feel that it is appropriate discharge home.  We went through his results and x-ray at the bedside, he is comfortable with the plan.      He is given instructions on nonspecific chest pain as well as the following instructions:  Like we talked about, I am not sure what is causing your symptoms today.  However, I think it is safe to go home today and follow-up with your primary care doctor.  Continue with exercise and dietary changes as you are doing.  I do think you should restart your Prilosec as well as this could be contributing.    As we discussed, should you develop any new symptoms or take a turn for the worse, come back here so we may recheck you.    FINAL IMPRESSION  1. Other chest pain           This dictation was created using voice recognition software.    Electronically signed by: Vince Childers M.D., 6/16/2022 9:15 AM

## 2022-10-20 ENCOUNTER — HOSPITAL ENCOUNTER (OUTPATIENT)
Dept: RADIOLOGY | Facility: MEDICAL CENTER | Age: 37
End: 2022-10-20
Attending: NURSE PRACTITIONER
Payer: MEDICAID

## 2022-10-20 DIAGNOSIS — R07.9 RIGHT-SIDED CHEST PAIN: ICD-10-CM

## 2022-10-20 DIAGNOSIS — K86.0 ALCOHOL-INDUCED CHRONIC PANCREATITIS (HCC): ICD-10-CM

## 2022-12-30 ENCOUNTER — APPOINTMENT (OUTPATIENT)
Dept: RADIOLOGY | Facility: MEDICAL CENTER | Age: 37
End: 2022-12-30
Attending: EMERGENCY MEDICINE

## 2022-12-30 ENCOUNTER — HOSPITAL ENCOUNTER (EMERGENCY)
Facility: MEDICAL CENTER | Age: 37
End: 2022-12-30
Attending: EMERGENCY MEDICINE
Payer: MEDICAID

## 2022-12-30 VITALS
OXYGEN SATURATION: 98 % | RESPIRATION RATE: 16 BRPM | BODY MASS INDEX: 30.76 KG/M2 | HEIGHT: 67 IN | WEIGHT: 195.99 LBS | SYSTOLIC BLOOD PRESSURE: 135 MMHG | DIASTOLIC BLOOD PRESSURE: 77 MMHG | TEMPERATURE: 98.1 F | HEART RATE: 93 BPM

## 2022-12-30 DIAGNOSIS — J02.9 PHARYNGITIS, UNSPECIFIED ETIOLOGY: ICD-10-CM

## 2022-12-30 LAB
FLUAV RNA SPEC QL NAA+PROBE: NEGATIVE
FLUBV RNA SPEC QL NAA+PROBE: NEGATIVE
RSV RNA SPEC QL NAA+PROBE: NEGATIVE
S PYO DNA SPEC NAA+PROBE: NOT DETECTED
SARS-COV-2 RNA RESP QL NAA+PROBE: NOTDETECTED
SPECIMEN SOURCE: NORMAL

## 2022-12-30 PROCEDURE — C9803 HOPD COVID-19 SPEC COLLECT: HCPCS | Performed by: EMERGENCY MEDICINE

## 2022-12-30 PROCEDURE — A9270 NON-COVERED ITEM OR SERVICE: HCPCS | Performed by: EMERGENCY MEDICINE

## 2022-12-30 PROCEDURE — 99283 EMERGENCY DEPT VISIT LOW MDM: CPT

## 2022-12-30 PROCEDURE — 700102 HCHG RX REV CODE 250 W/ 637 OVERRIDE(OP): Performed by: EMERGENCY MEDICINE

## 2022-12-30 PROCEDURE — 71045 X-RAY EXAM CHEST 1 VIEW: CPT

## 2022-12-30 PROCEDURE — C9803 HOPD COVID-19 SPEC COLLECT: HCPCS

## 2022-12-30 PROCEDURE — 87651 STREP A DNA AMP PROBE: CPT

## 2022-12-30 PROCEDURE — 0241U HCHG SARS-COV-2 COVID-19 NFCT DS RESP RNA 4 TRGT MIC: CPT

## 2022-12-30 RX ORDER — AMOXICILLIN 500 MG/1
500 TABLET, FILM COATED ORAL 2 TIMES DAILY
Qty: 20 TABLET | Refills: 0 | Status: SHIPPED | OUTPATIENT
Start: 2022-12-30 | End: 2022-12-30 | Stop reason: SDUPTHER

## 2022-12-30 RX ORDER — DEXAMETHASONE 4 MG/1
8 TABLET ORAL ONCE
Status: COMPLETED | OUTPATIENT
Start: 2022-12-30 | End: 2022-12-30

## 2022-12-30 RX ORDER — DEXAMETHASONE 4 MG/1
8 TABLET ORAL DAILY
Status: DISCONTINUED | OUTPATIENT
Start: 2022-12-31 | End: 2022-12-30

## 2022-12-30 RX ORDER — AMOXICILLIN 500 MG/1
500 TABLET, FILM COATED ORAL 2 TIMES DAILY
Qty: 20 TABLET | Refills: 0 | Status: SHIPPED | OUTPATIENT
Start: 2022-12-30

## 2022-12-30 RX ADMIN — DEXAMETHASONE 8 MG: 4 TABLET ORAL at 21:36

## 2022-12-30 ASSESSMENT — FIBROSIS 4 INDEX: FIB4 SCORE: 0.65

## 2022-12-31 NOTE — DISCHARGE INSTRUCTIONS
Look on [facility] My[facility] this evening to see if the strep test is positive.  If positive take the amoxicillin.  I will text or call you by tomorrow with the results if you do not see them on [facility] [employer] [facility].  Take ibuprofen and Tylenol for pain.  Rest and drink fluids.  Gargle salt water.  Return to the ER for shortness of breath inability to swallow or ill appearance which is not expected.

## 2022-12-31 NOTE — ED PROVIDER NOTES
ED Provider Note    CHIEF COMPLAINT  Chief Complaint   Patient presents with    Flu Like Symptoms    Sore Throat       HPI  Romanian Meliton Medellin is a 37 y.o. male who presents with 3 days of cough and severe sore throat.  He has pain with swallowing.  He is not sure if he has had a fever.  2 weeks ago he and his family were all ill and some of his family members tested positive for influenza.  His illness was brief and improved until 3 days ago.  No known ill contacts with strep.  No asthma but has been given an inhaler in the past.  Has not been using it.  He smokes marijuana but not tobacco.  History of diabetes on metformin and hypertension.  He is not really short of breath.  He does have some high sternal chest pain with swallowing and cough.    REVIEW OF SYSTEMS  Pertinent positives include: Sore throat, cough, recent probable case of influenza.  Pertinent negatives include: Vomiting, diarrhea, abdominal pain, swelling, rash.    PAST MEDICAL HISTORY  Past Medical History:   Diagnosis Date    Alcohol abuse     Alcohol abuse     5-10 beers    Alcoholic cirrhosis (HCC)     Anxiety     Asthma     Depression     ETOH abuse     Gastritis     Hypertension     Liver failure (HCC)     Pancreatitis     Ulcer     unsure if abdominal/intestinal       SOCIAL HISTORY  Smokes marijuana.    CURRENT MEDICATIONS  Home Medications       Reviewed by Jen Cole R.N. (Registered Nurse) on 12/30/22 at 2100  Med List Status: Not Addressed     Medication Last Dose Status   amLODIPine (NORVASC) 10 MG Tab  Active   atorvastatin (LIPITOR) 40 MG Tab  Active   DULoxetine (CYMBALTA) 60 MG Cap DR Particles delayed-release capsule  Active   lisinopril (PRINIVIL) 40 MG tablet  Active   metFORMIN ER (GLUCOPHAGE XR) 500 MG TABLET SR 24 HR  Active   metoprolol SR (TOPROL XL) 25 MG TABLET SR 24 HR  Active                    ALLERGIES  No Known Allergies    PHYSICAL EXAM  VITAL SIGNS: BP (!) 140/85   Pulse (!) 101   Temp 36.8 °C (98.2 °F)  "(Temporal)   Resp 16   Ht 1.702 m (5' 7\")   Wt 88.9 kg (195 lb 15.8 oz)   SpO2 96%   BMI 30.70 kg/m² . Reviewed and elevated blood pressure, mildly tachycardic, afebrile, no tachypnea, no hypoxia room air  Constitutional :  Well developed, Well nourished, normal sounding voice, no drooling or tripoding.   HNT: atraumatic, wearing a mask.  Intraoral erythema 1-2+ tonsils, no exudate, uvula midline  Ears: external ears normal.  Eyes: pupils reactive without eye discharge nor conjunctival hyperemia.  Neck: Normal range of motion, No tenderness, Supple, No stridor.   Cardiovascular: Regular rhythm, No murmurs, No rubs, No gallops.  No cyanosis.   Respiratory: No rales, rhonchi, wheeze.  occasional cough  Abdomen:  Soft, nontender  Skin: Warm, dry, no erythema, no rash.   Musculoskeletal: no limb deformities.    RADIOLOGY:  No orders to display       LABORATORY:  Results for orders placed or performed during the hospital encounter of 12/30/22   CoV-2, FLU A/B, and RSV by PCR (2-4 Hours CEPHEID) : Collect NP swab in VTM    Specimen: Nasopharyngeal; Respirate   Result Value Ref Range    Influenza virus A RNA Negative Negative    Influenza virus B, PCR Negative Negative    RSV, PCR Negative Negative    SARS-CoV-2 by PCR NotDetected     SARS-CoV-2 Source NP Swab    Group A Strep by PCR    Specimen: Throat; Respirate   Result Value Ref Range    Group A Strep by PCR Not Detected Not Detected       INTERVENTIONS:  Medications   dexamethasone (DECADRON) tablet 8 mg (8 mg Oral Given 12/30/22 2136)       COURSE & MEDICAL DECISION MAKING  This patient presents with pharyngitis a week or 2 after having influenza.  There is no evidence of strep throat, current influenza, RSV or COVID.  Clinically on exam there is no evidence of airway abscess such as epiglottitis, peritonsillar abscess, retropharyngeal abscess or Kevon's angina.  Despite probably having influenza a week ago there is no evidence of pneumonia on chest " x-ray.    PLAN:  Ibuprofen and Tylenol    Rest and fluids  Pharyngitis handout given  Return for shortness of breath, difficulty swallowing, ill appearance      CONDITION:  Good.    FINAL IMPRESSION:  1. Pharyngitis, unspecified etiology          Electronically signed by: Dejan Pretty M.D., 12/30/2022

## 2022-12-31 NOTE — ED TRIAGE NOTES
"Tajik Coelho Medellin  37 y.o. male    Chief Complaint   Patient presents with   • Flu Like Symptoms   • Sore Throat     Pt arrives with complaints of sore throat and flu-like symptoms for past week. Pt states that he and his family were sick \"a couple weeks ago\" with influenza. States he was feeling better but this week developed severe sore throat and subjective fevers.    Endorses productive cough     Vitals:    12/30/22 2049   BP: (!) 140/85   Pulse: (!) 101   Resp: 16   Temp: 36.8 °C (98.2 °F)   SpO2: 96%       Triage process explained to patient, apologized for wait time, and returned to [facility].  Pt informed to notify staff of any change in condition.     "

## 2023-01-03 ENCOUNTER — APPOINTMENT (OUTPATIENT)
Dept: RADIOLOGY | Facility: MEDICAL CENTER | Age: 38
End: 2023-01-03
Attending: EMERGENCY MEDICINE

## 2023-01-03 ENCOUNTER — HOSPITAL ENCOUNTER (EMERGENCY)
Facility: MEDICAL CENTER | Age: 38
End: 2023-01-03
Attending: EMERGENCY MEDICINE
Payer: MEDICAID

## 2023-01-03 VITALS
HEART RATE: 87 BPM | TEMPERATURE: 97.2 F | HEIGHT: 67 IN | WEIGHT: 201.28 LBS | RESPIRATION RATE: 17 BRPM | SYSTOLIC BLOOD PRESSURE: 159 MMHG | DIASTOLIC BLOOD PRESSURE: 95 MMHG | BODY MASS INDEX: 31.59 KG/M2 | OXYGEN SATURATION: 98 %

## 2023-01-03 DIAGNOSIS — J02.9 VIRAL PHARYNGITIS: ICD-10-CM

## 2023-01-03 DIAGNOSIS — B97.89 VIRAL RESPIRATORY INFECTION: ICD-10-CM

## 2023-01-03 DIAGNOSIS — J98.8 VIRAL RESPIRATORY INFECTION: ICD-10-CM

## 2023-01-03 LAB
ANION GAP SERPL CALC-SCNC: 9 MMOL/L (ref 7–16)
BASOPHILS # BLD AUTO: 0.5 % (ref 0–1.8)
BASOPHILS # BLD: 0.06 K/UL (ref 0–0.12)
BUN SERPL-MCNC: 12 MG/DL (ref 8–22)
CALCIUM SERPL-MCNC: 9.1 MG/DL (ref 8.5–10.5)
CHLORIDE SERPL-SCNC: 105 MMOL/L (ref 96–112)
CO2 SERPL-SCNC: 24 MMOL/L (ref 20–33)
CREAT SERPL-MCNC: 0.7 MG/DL (ref 0.5–1.4)
EOSINOPHIL # BLD AUTO: 0.19 K/UL (ref 0–0.51)
EOSINOPHIL NFR BLD: 1.7 % (ref 0–6.9)
ERYTHROCYTE [DISTWIDTH] IN BLOOD BY AUTOMATED COUNT: 46 FL (ref 35.9–50)
GFR SERPLBLD CREATININE-BSD FMLA CKD-EPI: 121 ML/MIN/1.73 M 2
GLUCOSE SERPL-MCNC: 118 MG/DL (ref 65–99)
HCT VFR BLD AUTO: 48.6 % (ref 42–52)
HGB BLD-MCNC: 16 G/DL (ref 14–18)
IMM GRANULOCYTES # BLD AUTO: 0.12 K/UL (ref 0–0.11)
IMM GRANULOCYTES NFR BLD AUTO: 1.1 % (ref 0–0.9)
LYMPHOCYTES # BLD AUTO: 3.91 K/UL (ref 1–4.8)
LYMPHOCYTES NFR BLD: 35.7 % (ref 22–41)
MCH RBC QN AUTO: 30 PG (ref 27–33)
MCHC RBC AUTO-ENTMCNC: 32.9 G/DL (ref 33.7–35.3)
MCV RBC AUTO: 91 FL (ref 81.4–97.8)
MONOCYTES # BLD AUTO: 0.85 K/UL (ref 0–0.85)
MONOCYTES NFR BLD AUTO: 7.8 % (ref 0–13.4)
NEUTROPHILS # BLD AUTO: 5.82 K/UL (ref 1.82–7.42)
NEUTROPHILS NFR BLD: 53.2 % (ref 44–72)
NRBC # BLD AUTO: 0 K/UL
NRBC BLD-RTO: 0 /100 WBC
PLATELET # BLD AUTO: 283 K/UL (ref 164–446)
PMV BLD AUTO: 11.4 FL (ref 9–12.9)
POTASSIUM SERPL-SCNC: 4.9 MMOL/L (ref 3.6–5.5)
RBC # BLD AUTO: 5.34 M/UL (ref 4.7–6.1)
S PYO DNA SPEC NAA+PROBE: NOT DETECTED
SODIUM SERPL-SCNC: 138 MMOL/L (ref 135–145)
WBC # BLD AUTO: 11 K/UL (ref 4.8–10.8)

## 2023-01-03 PROCEDURE — 99284 EMERGENCY DEPT VISIT MOD MDM: CPT

## 2023-01-03 PROCEDURE — 700111 HCHG RX REV CODE 636 W/ 250 OVERRIDE (IP): Mod: JW | Performed by: EMERGENCY MEDICINE

## 2023-01-03 PROCEDURE — 36415 COLL VENOUS BLD VENIPUNCTURE: CPT

## 2023-01-03 PROCEDURE — 85025 COMPLETE CBC W/AUTO DIFF WBC: CPT

## 2023-01-03 PROCEDURE — 80048 BASIC METABOLIC PNL TOTAL CA: CPT

## 2023-01-03 PROCEDURE — 87651 STREP A DNA AMP PROBE: CPT

## 2023-01-03 PROCEDURE — 96374 THER/PROPH/DIAG INJ IV PUSH: CPT

## 2023-01-03 PROCEDURE — 71045 X-RAY EXAM CHEST 1 VIEW: CPT

## 2023-01-03 RX ORDER — KETOROLAC TROMETHAMINE 30 MG/ML
15 INJECTION, SOLUTION INTRAMUSCULAR; INTRAVENOUS ONCE
Status: COMPLETED | OUTPATIENT
Start: 2023-01-03 | End: 2023-01-03

## 2023-01-03 RX ADMIN — KETOROLAC TROMETHAMINE 15 MG: 30 INJECTION, SOLUTION INTRAMUSCULAR at 06:57

## 2023-01-03 ASSESSMENT — FIBROSIS 4 INDEX: FIB4 SCORE: 0.65

## 2023-01-03 NOTE — ED PROVIDER NOTES
ED Provider Note        CHIEF COMPLAINT  Chief Complaint   Patient presents with    Headache     Patient seen for flu like symptoms on 12/30 with no significant findings. Presents for worsening sore throat, headache, and generalized weakness. Reports chills on and off.     Sore Throat     Patient states sore throat has become unbearable. Per patient, he lives in a household with individuals who tested flu positive.     Nasal Congestion       EXTERNAL RECORDS REVIEWED  Select: Outpatient Notes From prior ED visit    HPI  LIMITATION TO HISTORY   Select: : None  OUTSIDE HISTORIAN(S):  Select: None    Leo Medellin is a 37 y.o. male who presents with sore throat, headache, cough, subjective fevers.  States that symptoms have been ongoing for about 3 weeks now.  Had symptoms for about a week that seemed to be slightly improved though got sick again.  Notes that 2 of his children at home were diagnosed with flu about 2 weeks ago.  Patient was seen here 4 days ago and had chest x-ray, strep, viral swab testing which were all unremarkable.  He was treated with steroids and the patient states that he felt better for about a day prior to getting worse again.    REVIEW OF SYSTEMS  See HPI for further details. All other systems are negative.     PAST MEDICAL HISTORY   has a past medical history of Alcohol abuse, Alcohol abuse, Alcoholic cirrhosis (HCC), Anxiety, Asthma, Depression, ETOH abuse, Gastritis, Hypertension, Liver failure (HCC), Pancreatitis, and Ulcer.    SURGICAL HISTORY   has a past surgical history that includes appendectomy and appendectomy.    FAMILY HISTORY  Family History   Problem Relation Age of Onset    Diabetes Brother     Hypertension Mother        SOCIAL HISTORY  Social History     Tobacco Use    Smoking status: Former     Types: Cigarettes    Smokeless tobacco: Never   Vaping Use    Vaping Use: Every day    Substances: THC   Substance and Sexual Activity    Alcohol use: Not Currently     Comment:  "4/7/2021 quit    Drug use: Yes     Types: Inhaled     Comment: lianne daily    Sexual activity: Not on file       CURRENT MEDICATIONS  Home Medications       Reviewed by Mariely Neves R.N. (Registered Nurse) on 01/03/23 at 0505  Med List Status: Partial     Medication Last Dose Status   amLODIPine (NORVASC) 10 MG Tab  Active   Amoxicillin 500 MG Tab  Active   atorvastatin (LIPITOR) 40 MG Tab  Active   DULoxetine (CYMBALTA) 60 MG Cap DR Particles delayed-release capsule  Active   lisinopril (PRINIVIL) 40 MG tablet  Active   metFORMIN ER (GLUCOPHAGE XR) 500 MG TABLET SR 24 HR  Active   metoprolol SR (TOPROL XL) 25 MG TABLET SR 24 HR  Active                    ALLERGIES  No Known Allergies    PHYSICAL EXAM  VITAL SIGNS: BP (!) 145/106   Pulse 74   Temp 36 °C (96.8 °F) (Temporal)   Resp 16   Ht 1.702 m (5' 7\")   Wt 91.3 kg (201 lb 4.5 oz)   SpO2 99%   BMI 31.52 kg/m²    Constitutional: Alert in no apparent distress.  HENT: No signs of trauma, Bilateral external ears normal, Nose normal.  Uvula is midline, no soft palatal swelling, no posterior pharyngeal erythema, no trismus  Eyes: Pupils are equal and reactive, Conjunctiva normal, Non-icteric.   Neck: Normal range of motion, Supple, No stridor.   Cardiovascular: Regular rate and rhythm.   Thorax & Lungs: Normal breath sounds, No respiratory distress, No wheezing  Abdomen: Soft, No tenderness, No peritoneal signs.   Skin: Warm, Dry, No erythema, No rash.   Musculoskeletal: Good range of motion in all major joints. No major deformities noted.   Neurologic: Alert, No focal deficits noted.      DIAGNOSTIC STUDIES / PROCEDURES  EKG      LABS  Labs Reviewed   CBC WITH DIFFERENTIAL - Abnormal; Notable for the following components:       Result Value    WBC 11.0 (*)     MCHC 32.9 (*)     Immature Granulocytes 1.10 (*)     Immature Granulocytes (abs) 0.12 (*)     All other components within normal limits   BASIC METABOLIC PANEL - Abnormal; Notable for the following " components:    Glucose 118 (*)     All other components within normal limits   GROUP A STREP BY PCR   ESTIMATED GFR       RADIOLOGY  I have independently interpreted the diagnostic imaging associated with this visit and am waiting the final reading from the radiologist.   DX-CHEST-PORTABLE (1 VIEW)   Final Result      No acute cardiac or pulmonary abnormalities are identified.          COURSE & MEDICAL DECISION MAKING  Pertinent Labs & Imaging studies reviewed. (See chart for details)    ED Observation Status? No; Patient does not meet criteria for ED Observation.     INITIAL ASSESSMENT AND PLAN  37 y.o. male, otherwise healthy with no significant medical history, presenting with 3 weeks of sore throat, cough, generalized weakness, headache.  Vital signs are unremarkable upon arrival.  No fever or tachycardia.  No hypoxia or respiratory distress.  Clear breath sounds bilaterally.  Patient has multiple sick contacts at home though they were sick about 2 weeks ago and the patient remains not feeling well.  This is the patient's second visit to the emergency department for similar symptoms.  He was seen here 4 days ago.  States that he has not improved at all.    Basic laboratory work and x-ray will be performed for further evaluation given duration of symptoms.  We will look for evidence of pneumonia or significant abnormalities in the blood test.      @HTN/IDDM FOLLOW UP:  The patient is referred to a primary physician for blood pressure management, diabetic screening, and for all other preventive health concerns@     Escalation of care considered, and ultimately not performed: CT imaging of the neck    Barriers to care at this time, including but not limited to: Select: N/a .     Diagnostic tests and prescription drugs considered including, but not limited to: Select: N/a .        FINAL PROBLEM LIST AND DISPOSITION  37 y.o. male presenting with nasal congestion, cough, sore throat, headache, subjective fevers.   Multiple sick contacts at home with similar symptoms though this was about 2 weeks ago.  Patient did have similar symptoms over the past 3 weeks now.  Was seen here 4 days ago and had viral swab testing and x-ray which were unremarkable.  Patient was treated with Decadron and discharged.    Upon arrival today, vitals are unremarkable.  No fever, tachycardia, respiratory distress.  Clear voice.  No trismus.  Posterior pharynx is unremarkable.  Uvula is midline.  No soft palatal swelling or erythema.  Patient most likely with viral pharyngitis at this time.  Strep testing was performed once again which was negative.  No evidence of strep throat.  Low suspicion for deep space infection in the neck given the patient's clinical findings.  No indication for CT or advanced imaging at this time.    Given the duration of the patient's symptoms and repeat visits to the emergency department, care was escalated with laboratory studies.  Laboratory studies were performed which were unremarkable.  Has nonspecific mild leukocytosis without left shift.  He did receive steroids a few days ago which can cause leukocytosis.  This also may be reactive from stress demargination given his illness.  No obvious signs of a serious bacterial illness at this time however.  Patient was treated with IV Toradol for headache.  Chest x-ray was reperformed as well.    Patient most likely with a viral illness such as influenza resulting in viral pharyngitis.  I do not believe repeat viral swab testing is indicated at this time however as this will not change plan of care.      The patient will not drink alcohol nor drive with prescribed medications. The patient will return for worsening symptoms and is stable at the time of discharge. The patient verbalizes understanding and will comply.    FINAL IMPRESSION  1. Viral respiratory infection    2. Viral pharyngitis           Electronically signed by: Eriberto Joya M.D., 1/3/2023 6:13 AM

## 2023-01-03 NOTE — ED TRIAGE NOTES
Chief Complaint   Patient presents with    Headache     Patient seen for flu like symptoms on 12/30 with no significant findings. Presents for worsening sore throat, headache, and generalized weakness. Reports chills on and off.     Sore Throat     Patient states sore throat has become unbearable. Per patient, he lives in a household with individuals who tested flu positive.     Nasal Congestion

## 2023-01-03 NOTE — ED NOTES
PT states he was seen in ED on 12/30 for flu-like symptoms and sore throat. Sore throat has persisted and worsened since he was previously seen. PT states he now has a headache (7/10 severity) and has started to feel nauseous. Denies taking any medications at home today.    PT ambulated with steady gait to TCS 22.

## 2023-08-21 ENCOUNTER — HOSPITAL ENCOUNTER (EMERGENCY)
Facility: MEDICAL CENTER | Age: 38
End: 2023-08-21
Attending: EMERGENCY MEDICINE
Payer: MEDICAID

## 2023-08-21 ENCOUNTER — APPOINTMENT (OUTPATIENT)
Dept: RADIOLOGY | Facility: MEDICAL CENTER | Age: 38
End: 2023-08-21
Attending: EMERGENCY MEDICINE

## 2023-08-21 VITALS
TEMPERATURE: 97.6 F | HEIGHT: 67 IN | WEIGHT: 198.63 LBS | SYSTOLIC BLOOD PRESSURE: 145 MMHG | HEART RATE: 86 BPM | BODY MASS INDEX: 31.18 KG/M2 | DIASTOLIC BLOOD PRESSURE: 90 MMHG | OXYGEN SATURATION: 95 % | RESPIRATION RATE: 16 BRPM

## 2023-08-21 DIAGNOSIS — R07.9 CHEST PAIN, UNSPECIFIED TYPE: ICD-10-CM

## 2023-08-21 DIAGNOSIS — M25.512 ACUTE PAIN OF LEFT SHOULDER: ICD-10-CM

## 2023-08-21 DIAGNOSIS — R10.12 LUQ PAIN: ICD-10-CM

## 2023-08-21 LAB
ALBUMIN SERPL BCP-MCNC: 4.2 G/DL (ref 3.2–4.9)
ALBUMIN/GLOB SERPL: 1.6 G/DL
ALP SERPL-CCNC: 98 U/L (ref 30–99)
ALT SERPL-CCNC: 32 U/L (ref 2–50)
ANION GAP SERPL CALC-SCNC: 11 MMOL/L (ref 7–16)
AST SERPL-CCNC: 26 U/L (ref 12–45)
BASOPHILS # BLD AUTO: 0.5 % (ref 0–1.8)
BASOPHILS # BLD: 0.05 K/UL (ref 0–0.12)
BILIRUB SERPL-MCNC: <0.2 MG/DL (ref 0.1–1.5)
BUN SERPL-MCNC: 17 MG/DL (ref 8–22)
CALCIUM ALBUM COR SERPL-MCNC: 9 MG/DL (ref 8.5–10.5)
CALCIUM SERPL-MCNC: 9.2 MG/DL (ref 8.5–10.5)
CHLORIDE SERPL-SCNC: 107 MMOL/L (ref 96–112)
CO2 SERPL-SCNC: 20 MMOL/L (ref 20–33)
CREAT SERPL-MCNC: 0.98 MG/DL (ref 0.5–1.4)
EKG IMPRESSION: NORMAL
EOSINOPHIL # BLD AUTO: 0.24 K/UL (ref 0–0.51)
EOSINOPHIL NFR BLD: 2.2 % (ref 0–6.9)
ERYTHROCYTE [DISTWIDTH] IN BLOOD BY AUTOMATED COUNT: 45.4 FL (ref 35.9–50)
GFR SERPLBLD CREATININE-BSD FMLA CKD-EPI: 101 ML/MIN/1.73 M 2
GLOBULIN SER CALC-MCNC: 2.7 G/DL (ref 1.9–3.5)
GLUCOSE SERPL-MCNC: 148 MG/DL (ref 65–99)
HCT VFR BLD AUTO: 48.4 % (ref 42–52)
HGB BLD-MCNC: 16.1 G/DL (ref 14–18)
IMM GRANULOCYTES # BLD AUTO: 0.09 K/UL (ref 0–0.11)
IMM GRANULOCYTES NFR BLD AUTO: 0.8 % (ref 0–0.9)
LYMPHOCYTES # BLD AUTO: 3.48 K/UL (ref 1–4.8)
LYMPHOCYTES NFR BLD: 32 % (ref 22–41)
MCH RBC QN AUTO: 30.3 PG (ref 27–33)
MCHC RBC AUTO-ENTMCNC: 33.3 G/DL (ref 32.3–36.5)
MCV RBC AUTO: 91 FL (ref 81.4–97.8)
MONOCYTES # BLD AUTO: 0.89 K/UL (ref 0–0.85)
MONOCYTES NFR BLD AUTO: 8.2 % (ref 0–13.4)
NEUTROPHILS # BLD AUTO: 6.12 K/UL (ref 1.82–7.42)
NEUTROPHILS NFR BLD: 56.3 % (ref 44–72)
NRBC # BLD AUTO: 0 K/UL
NRBC BLD-RTO: 0 /100 WBC (ref 0–0.2)
PLATELET # BLD AUTO: 230 K/UL (ref 164–446)
PMV BLD AUTO: 11.9 FL (ref 9–12.9)
POTASSIUM SERPL-SCNC: 4.2 MMOL/L (ref 3.6–5.5)
PROT SERPL-MCNC: 6.9 G/DL (ref 6–8.2)
RBC # BLD AUTO: 5.32 M/UL (ref 4.7–6.1)
SODIUM SERPL-SCNC: 138 MMOL/L (ref 135–145)
TROPONIN T SERPL-MCNC: <6 NG/L (ref 6–19)
TROPONIN T SERPL-MCNC: <6 NG/L (ref 6–19)
WBC # BLD AUTO: 10.9 K/UL (ref 4.8–10.8)

## 2023-08-21 PROCEDURE — 96374 THER/PROPH/DIAG INJ IV PUSH: CPT

## 2023-08-21 PROCEDURE — 700111 HCHG RX REV CODE 636 W/ 250 OVERRIDE (IP): Mod: JZ,UD | Performed by: EMERGENCY MEDICINE

## 2023-08-21 PROCEDURE — 99285 EMERGENCY DEPT VISIT HI MDM: CPT

## 2023-08-21 PROCEDURE — 36415 COLL VENOUS BLD VENIPUNCTURE: CPT

## 2023-08-21 PROCEDURE — 71045 X-RAY EXAM CHEST 1 VIEW: CPT

## 2023-08-21 PROCEDURE — 85025 COMPLETE CBC W/AUTO DIFF WBC: CPT

## 2023-08-21 PROCEDURE — 84484 ASSAY OF TROPONIN QUANT: CPT

## 2023-08-21 PROCEDURE — 93005 ELECTROCARDIOGRAM TRACING: CPT

## 2023-08-21 PROCEDURE — 93005 ELECTROCARDIOGRAM TRACING: CPT | Performed by: EMERGENCY MEDICINE

## 2023-08-21 PROCEDURE — 80053 COMPREHEN METABOLIC PANEL: CPT

## 2023-08-21 RX ORDER — KETOROLAC TROMETHAMINE 30 MG/ML
30 INJECTION, SOLUTION INTRAMUSCULAR; INTRAVENOUS ONCE
Status: COMPLETED | OUTPATIENT
Start: 2023-08-21 | End: 2023-08-21

## 2023-08-21 RX ORDER — NAPROXEN 375 MG/1
375 TABLET ORAL 2 TIMES DAILY WITH MEALS
Qty: 60 TABLET | Refills: 0 | Status: SHIPPED | OUTPATIENT
Start: 2023-08-21

## 2023-08-21 RX ADMIN — KETOROLAC TROMETHAMINE 30 MG: 30 INJECTION, SOLUTION INTRAMUSCULAR; INTRAVENOUS at 08:01

## 2023-08-21 ASSESSMENT — HEART SCORE
HISTORY: SLIGHTLY SUSPICIOUS
RISK FACTORS: >2 RISK FACTORS OR HX OF ATHEROSCLEROTIC DISEASE
ECG: NORMAL
AGE: <45
TROPONIN: LESS THAN OR EQUAL TO NORMAL LIMIT
HEART SCORE: 2

## 2023-08-21 ASSESSMENT — FIBROSIS 4 INDEX: FIB4 SCORE: 0.65

## 2023-08-21 ASSESSMENT — PAIN DESCRIPTION - PAIN TYPE: TYPE: ACUTE PAIN

## 2023-08-21 NOTE — ED PROVIDER NOTES
"ED Provider Note    CHIEF COMPLAINT  Chief Complaint   Patient presents with    Chest Pressure     Started last night with SOB.    Shoulder Pain     Feels stiff, denies trauma.       EXTERNAL RECORDS REVIEWED  Patient's last encounter was an ED visit in January of this year he was seen for URI symptoms, viral pharyngitis.    Hospital encounter, admission February 2022 for acute pancreatitis and generalized weakness      HPI/ROS  LIMITATION TO HISTORY   Select: : None  OUTSIDE HISTORIAN(S):  None    Leo Medellin is a 37 y.o. male who presents via private vehicle.  He is complaining of multiple symptoms including left shoulder pain, left upper quadrant abdominal pain and left leg pain.  He denies any trauma.  No fever.  He has had some nausea.  He is not experiencing any chest pain all though he has had some mild shortness of breath over the last few days.  He reports that he struggles with alcohol abuse.  For the most part he has been sober for the last 6 years.  During that time, he has had 2 relapses.  Each around the 3-year sushma.  He states both times circumstances were similar.  He was celebrating a \"date night\" with his wife and was hoping that he could \"be a normal person and just have a drink\".  But each time, it has not led to just 1 night of drinking multiple nights of drinking.  He relapsed about 1 month ago and has been drinking heavily until about 2 weeks ago.  He does report that the drinking makes him feel unwell.  And he has again stopped drinking.  He is supposed to be taking medication for high cholesterol, hypertension, diabetes, sleep disorder and depression but he has not been taking any of his normal medications for about 2 or 3 months, possibly more.  He was told most recently by his primary care doctor, that he could discontinue his hypertensive and diabetic medications because these seem to be better controlled.  He denies other substance use disorder.    PAST MEDICAL HISTORY   has a " "past medical history of Alcohol abuse, Alcohol abuse, Alcoholic cirrhosis (HCC), Anxiety, Asthma, Depression, ETOH abuse, Gastritis, Hypertension, Liver failure (HCC), Pancreatitis, and Ulcer.    SURGICAL HISTORY   has a past surgical history that includes appendectomy and appendectomy.    FAMILY HISTORY  Family History   Problem Relation Age of Onset    Diabetes Brother     Hypertension Mother        SOCIAL HISTORY  Social History     Tobacco Use    Smoking status: Former     Types: Cigarettes    Smokeless tobacco: Never   Vaping Use    Vaping Use: Every day    Substances: THC   Substance and Sexual Activity    Alcohol use: Not Currently     Comment: 4/7/2021 quit    Drug use: Yes     Types: Inhaled     Comment: marajuana daily    Sexual activity: Not on file       CURRENT MEDICATIONS  Home Medications       Reviewed by Ann Peng R.N. (Registered Nurse) on 08/21/23 at 9304  Med List Status: Partial     Medication Last Dose Status   amLODIPine (NORVASC) 10 MG Tab  Active   Amoxicillin 500 MG Tab  Active   atorvastatin (LIPITOR) 40 MG Tab  Active   DULoxetine (CYMBALTA) 60 MG Cap DR Particles delayed-release capsule  Active   lisinopril (PRINIVIL) 40 MG tablet  Active   metFORMIN ER (GLUCOPHAGE XR) 500 MG TABLET SR 24 HR  Active   metoprolol SR (TOPROL XL) 25 MG TABLET SR 24 HR  Active                    ALLERGIES  No Known Allergies    PHYSICAL EXAM  VITAL SIGNS: BP (!) 145/90   Pulse 86   Temp 36.4 °C (97.6 °F) (Temporal)   Resp 16   Ht 1.702 m (5' 7\")   Wt 90.1 kg (198 lb 10.2 oz)   SpO2 95%   BMI 31.11 kg/m²    Vitals reviewed.  Constitutional: Patient is oriented to person, place, and time. Appears well-developed and well-nourished. No distress.    Head: Normocephalic and atraumatic.   Mouth/Throat: Oropharynx is clear and moist  Eyes: Conjunctivae are normal. Pupils are equal, round  Neck: Normal range of motion. Neck supple.   Cardiovascular: Normal rate, regular rhythm and normal heart sounds. " Normal peripheral pulses.  Pulmonary/Chest: Effort normal and breath sounds normal. No respiratory distress, no wheezes, rhonchi, or rales. No chest wall tenderness.  Abdominal: Soft. Bowel sounds are normal. There is no tenderness, rebound or guarding, or peritoneal signs  Musculoskeletal: No edema and no tenderness.   Neurological: No cranial nerve deficits. Normal motor and sensory exam. No focal deficits.   Skin: Skin is warm and dry. No erythema. No pallor.   Psychiatric: Patient has a normal mood and affect.     DIAGNOSTIC STUDIES / PROCEDURES  EKG  I have independently interpreted this EKG    LABS  Results for orders placed or performed during the hospital encounter of 08/21/23   CBC with Differential   Result Value Ref Range    WBC 10.9 (H) 4.8 - 10.8 K/uL    RBC 5.32 4.70 - 6.10 M/uL    Hemoglobin 16.1 14.0 - 18.0 g/dL    Hematocrit 48.4 42.0 - 52.0 %    MCV 91.0 81.4 - 97.8 fL    MCH 30.3 27.0 - 33.0 pg    MCHC 33.3 32.3 - 36.5 g/dL    RDW 45.4 35.9 - 50.0 fL    Platelet Count 230 164 - 446 K/uL    MPV 11.9 9.0 - 12.9 fL    Neutrophils-Polys 56.30 44.00 - 72.00 %    Lymphocytes 32.00 22.00 - 41.00 %    Monocytes 8.20 0.00 - 13.40 %    Eosinophils 2.20 0.00 - 6.90 %    Basophils 0.50 0.00 - 1.80 %    Immature Granulocytes 0.80 0.00 - 0.90 %    Nucleated RBC 0.00 0.00 - 0.20 /100 WBC    Neutrophils (Absolute) 6.12 1.82 - 7.42 K/uL    Lymphs (Absolute) 3.48 1.00 - 4.80 K/uL    Monos (Absolute) 0.89 (H) 0.00 - 0.85 K/uL    Eos (Absolute) 0.24 0.00 - 0.51 K/uL    Baso (Absolute) 0.05 0.00 - 0.12 K/uL    Immature Granulocytes (abs) 0.09 0.00 - 0.11 K/uL    NRBC (Absolute) 0.00 K/uL   Complete Metabolic Panel (CMP)   Result Value Ref Range    Sodium 138 135 - 145 mmol/L    Potassium 4.2 3.6 - 5.5 mmol/L    Chloride 107 96 - 112 mmol/L    Co2 20 20 - 33 mmol/L    Anion Gap 11.0 7.0 - 16.0    Glucose 148 (H) 65 - 99 mg/dL    Bun 17 8 - 22 mg/dL    Creatinine 0.98 0.50 - 1.40 mg/dL    Calcium 9.2 8.5 - 10.5 mg/dL     Correct Calcium 9.0 8.5 - 10.5 mg/dL    AST(SGOT) 26 12 - 45 U/L    ALT(SGPT) 32 2 - 50 U/L    Alkaline Phosphatase 98 30 - 99 U/L    Total Bilirubin <0.2 0.1 - 1.5 mg/dL    Albumin 4.2 3.2 - 4.9 g/dL    Total Protein 6.9 6.0 - 8.2 g/dL    Globulin 2.7 1.9 - 3.5 g/dL    A-G Ratio 1.6 g/dL   Troponins NOW   Result Value Ref Range    Troponin T <6 6 - 19 ng/L   Troponins in two (2) hours   Result Value Ref Range    Troponin T <6 6 - 19 ng/L   ESTIMATED GFR   Result Value Ref Range    GFR (CKD-EPI) 101 >60 mL/min/1.73 m 2   EKG (NOW)   Result Value Ref Range    Report       [facility] Emergency Dept.    Test Date:  2023  Pt Name:    JACKIE SMITH          Department: ER  MRN:        [id number]                      Room:  Gender:     Male                         Technician: [id number]  :        1985                   Requested By:ER TRIAGE PROTOCOL  Order #:    [id number]                    Reading MD: BIN FERNANDEZ DO    Measurements  Intervals                                Axis  Rate:       66                           P:          57  NY:         122                          QRS:        -17  QRSD:       84                           T:          24  QT:         410  QTc:        430    Interpretive Statements  Sinus rhythm  Left ventricular hypertrophy  Anterior ST elevation, probably due to LVH  Compared to ECG 2022 06:00:29  Left ventricular hypertrophy now present  ST (T wave) deviation now present  Myocardial infarct finding no longer present  Electronically Signed On 2023 04:51:34 PDT by BIN FERNANDEZ DO         RADIOLOGY  I have independently interpreted the diagnostic imaging associated with this visit and am waiting the final reading from the radiologist.   My preliminary interpretation is as follows: [facility]  Radiologist interpretation:   DX-CHEST-PORTABLE (1 VIEW)   Final Result         1.  No acute cardiopulmonary disease.          COURSE & MEDICAL DECISION  MAKING    ED Observation Status? Yes; I am placing the patient in to an observation status due to a diagnostic uncertainty as well as therapeutic intensity. Patient placed in observation status at 4:51 AM, 8/21/2023.     Observation plan is as follows: Due to diagnostic uncertainty, possible need for further advanced imaging, further cardiac evaluation, patient's placed in ED observation.    Upon Reevaluation, the patient's condition has: Improved; and will be discharged.    Patient discharged from ED Observation status at 8:40 AM (Time) August 21, 2023 (Date).     INITIAL ASSESSMENT, COURSE AND PLAN  Care Narrative:     This is a very pleasant, quite insightful, 37-year-old male.  Unfortunately, he struggles with alcohol abuse.  Recently relapsed about a month ago but has been abstaining from alcohol for the last 2 weeks.  He has reassuring vital signs.  He is concerned about left-sided shoulder, chest, upper abdominal pain.  He does have a history of pancreatitis.  EKG is reassuring.  Chest x-ray is reassuring.  Await labs, delta troponin.  He is not in need of any pain or antiemetic therapy at this time.    8:21 AM patient is reevaluated at the bedside.  We discussed lab results which are reassuring.  Delta troponin negative.  He does admit that likely his left shoulder pain is due to repetitive use.  He is requesting a work note.  He is advised on stretching, anti-inflammatories.  Doubt ACS.  Heart score 2.  He is given strict return precautions.  Given an opportunity for questions.  He is encouraged to continue on in his sobriety.  He is discharged home in stable condition.        DISPOSITION AND DISCUSSIONS  I have discussed management of the patient with the following physicians and LUCRETIA's: None     Discussion of management with other [facility] or appropriate source(s): None    Escalation of care considered, and ultimately not performed:acute inpatient care management, however at this time, the patient is most  appropriate for outpatient management    Barriers to care at this time, including but not limited to:  None .     Decision tools and prescription drugs considered including, but not limited to: Heart score: 2    FINAL DIAGNOSIS  1. Acute pain of left shoulder    2. LUQ pain    3. Chest pain, unspecified type           Electronically signed by: Jessica Rae D.O., 8/21/2023 4:50 AM

## 2023-08-21 NOTE — ED NOTES
Brought patient ice water, Patient resting comfortably in bed with steady and unlabored breathing.  Gurney in lowest position and call light within reach.

## 2023-08-21 NOTE — Clinical Note
Leo Medellin was seen and treated in our emergency department on 8/21/2023.  He may return to work on 08/22/2023.       If you have any questions or concerns, please don't hesitate to call.      Jessica Rae D.O.

## 2023-08-21 NOTE — DISCHARGE INSTRUCTIONS
I suspect you likely have arthritis to your left shoulder.  Your evaluation here in the emergency department today is reassuring.  Chest x-ray, EKG, enzymes from your heart are normal.  Please continue on your path to sobriety.

## 2023-08-21 NOTE — ED NOTES
Bedside report received from prior RN Shereen. Pt alert A&O x4. Resp normal/unlabored. Bed in the lowest position with rails up and locked. Updated to POC and all questions answered.     Pain reevaluated 6/10 shoulder/chest.

## 2023-08-21 NOTE — ED TRIAGE NOTES
Leo Coelho Medellin  37 y.o. male  Chief Complaint   Patient presents with    Chest Pressure     Started last night with SOB.    Shoulder Pain     Feels stiff, denies trauma.     Pt ambulatory to triage with steady gait for above complaint.     Pt is GCS 15, speaking in full sentences, follows commands and responds appropriately to questions. Resp are even and unlabored.    Chest pain protocol ordered. Pt placed in phlebotomy. Pt educated on triage process. Pt encouraged to alert staff for any changes.       Vitals:    08/21/23 0412   BP: (!) 143/91   Pulse: 71   Resp: 18   Temp: 36.2 °C (97.1 °F)   SpO2: 97%

## 2023-09-18 ENCOUNTER — HOSPITAL ENCOUNTER (OUTPATIENT)
Dept: RADIOLOGY | Facility: MEDICAL CENTER | Age: 38
End: 2023-09-18
Attending: NURSE PRACTITIONER
Payer: MEDICAID

## 2023-09-18 DIAGNOSIS — M79.605 LEFT LEG PAIN: ICD-10-CM

## 2023-09-18 NOTE — ED NOTES
Pt ambulating to restroom with steady gait   Cantharidin Counseling:  I discussed with the patient the risks of Cantharidin including but not limited to pain, redness, burning, itching, and blistering.

## 2023-09-26 ENCOUNTER — HOSPITAL ENCOUNTER (EMERGENCY)
Facility: MEDICAL CENTER | Age: 38
End: 2023-09-26
Attending: EMERGENCY MEDICINE
Payer: MEDICAID

## 2023-09-26 ENCOUNTER — APPOINTMENT (OUTPATIENT)
Dept: RADIOLOGY | Facility: MEDICAL CENTER | Age: 38
End: 2023-09-26
Attending: EMERGENCY MEDICINE

## 2023-09-26 VITALS
BODY MASS INDEX: 31.28 KG/M2 | SYSTOLIC BLOOD PRESSURE: 162 MMHG | DIASTOLIC BLOOD PRESSURE: 99 MMHG | OXYGEN SATURATION: 97 % | TEMPERATURE: 97.3 F | HEIGHT: 67 IN | RESPIRATION RATE: 17 BRPM | HEART RATE: 92 BPM | WEIGHT: 199.3 LBS

## 2023-09-26 DIAGNOSIS — H53.8 BLURRED VISION: ICD-10-CM

## 2023-09-26 DIAGNOSIS — R51.9 NONINTRACTABLE HEADACHE, UNSPECIFIED CHRONICITY PATTERN, UNSPECIFIED HEADACHE TYPE: ICD-10-CM

## 2023-09-26 LAB
ALBUMIN SERPL BCP-MCNC: 4.2 G/DL (ref 3.2–4.9)
ALBUMIN/GLOB SERPL: 1.7 G/DL
ALP SERPL-CCNC: 92 U/L (ref 30–99)
ALT SERPL-CCNC: 29 U/L (ref 2–50)
ANION GAP SERPL CALC-SCNC: 9 MMOL/L (ref 7–16)
AST SERPL-CCNC: 18 U/L (ref 12–45)
BASOPHILS # BLD AUTO: 0.4 % (ref 0–1.8)
BASOPHILS # BLD: 0.04 K/UL (ref 0–0.12)
BILIRUB SERPL-MCNC: 0.2 MG/DL (ref 0.1–1.5)
BUN SERPL-MCNC: 11 MG/DL (ref 8–22)
CALCIUM ALBUM COR SERPL-MCNC: 8.6 MG/DL (ref 8.5–10.5)
CALCIUM SERPL-MCNC: 8.8 MG/DL (ref 8.5–10.5)
CHLORIDE SERPL-SCNC: 105 MMOL/L (ref 96–112)
CO2 SERPL-SCNC: 25 MMOL/L (ref 20–33)
CREAT SERPL-MCNC: 0.93 MG/DL (ref 0.5–1.4)
EOSINOPHIL # BLD AUTO: 0.15 K/UL (ref 0–0.51)
EOSINOPHIL NFR BLD: 1.6 % (ref 0–6.9)
ERYTHROCYTE [DISTWIDTH] IN BLOOD BY AUTOMATED COUNT: 43.8 FL (ref 35.9–50)
GFR SERPLBLD CREATININE-BSD FMLA CKD-EPI: 107 ML/MIN/1.73 M 2
GLOBULIN SER CALC-MCNC: 2.5 G/DL (ref 1.9–3.5)
GLUCOSE BLD STRIP.AUTO-MCNC: 228 MG/DL (ref 65–99)
GLUCOSE SERPL-MCNC: 171 MG/DL (ref 65–99)
HCT VFR BLD AUTO: 45.8 % (ref 42–52)
HGB BLD-MCNC: 15.5 G/DL (ref 14–18)
IMM GRANULOCYTES # BLD AUTO: 0.05 K/UL (ref 0–0.11)
IMM GRANULOCYTES NFR BLD AUTO: 0.5 % (ref 0–0.9)
LYMPHOCYTES # BLD AUTO: 2.59 K/UL (ref 1–4.8)
LYMPHOCYTES NFR BLD: 27.5 % (ref 22–41)
MCH RBC QN AUTO: 31 PG (ref 27–33)
MCHC RBC AUTO-ENTMCNC: 33.8 G/DL (ref 32.3–36.5)
MCV RBC AUTO: 91.6 FL (ref 81.4–97.8)
MONOCYTES # BLD AUTO: 0.65 K/UL (ref 0–0.85)
MONOCYTES NFR BLD AUTO: 6.9 % (ref 0–13.4)
NEUTROPHILS # BLD AUTO: 5.93 K/UL (ref 1.82–7.42)
NEUTROPHILS NFR BLD: 63.1 % (ref 44–72)
NRBC # BLD AUTO: 0 K/UL
NRBC BLD-RTO: 0 /100 WBC (ref 0–0.2)
PLATELET # BLD AUTO: 226 K/UL (ref 164–446)
PMV BLD AUTO: 11.9 FL (ref 9–12.9)
POTASSIUM SERPL-SCNC: 3.9 MMOL/L (ref 3.6–5.5)
PROT SERPL-MCNC: 6.7 G/DL (ref 6–8.2)
RBC # BLD AUTO: 5 M/UL (ref 4.7–6.1)
SODIUM SERPL-SCNC: 139 MMOL/L (ref 135–145)
WBC # BLD AUTO: 9.4 K/UL (ref 4.8–10.8)

## 2023-09-26 PROCEDURE — 36415 COLL VENOUS BLD VENIPUNCTURE: CPT

## 2023-09-26 PROCEDURE — 82962 GLUCOSE BLOOD TEST: CPT

## 2023-09-26 PROCEDURE — 700101 HCHG RX REV CODE 250: Mod: UD | Performed by: EMERGENCY MEDICINE

## 2023-09-26 PROCEDURE — 700117 HCHG RX CONTRAST REV CODE 255: Mod: UD | Performed by: EMERGENCY MEDICINE

## 2023-09-26 PROCEDURE — 70496 CT ANGIOGRAPHY HEAD: CPT

## 2023-09-26 PROCEDURE — 80053 COMPREHEN METABOLIC PANEL: CPT

## 2023-09-26 PROCEDURE — 99284 EMERGENCY DEPT VISIT MOD MDM: CPT

## 2023-09-26 PROCEDURE — 85025 COMPLETE CBC W/AUTO DIFF WBC: CPT

## 2023-09-26 RX ORDER — PROPARACAINE HYDROCHLORIDE 5 MG/ML
1 SOLUTION/ DROPS OPHTHALMIC ONCE
Status: COMPLETED | OUTPATIENT
Start: 2023-09-26 | End: 2023-09-26

## 2023-09-26 RX ADMIN — FLUORESCEIN SODIUM 1 MG: 1 STRIP OPHTHALMIC at 09:30

## 2023-09-26 RX ADMIN — PROPARACAINE HYDROCHLORIDE 1 DROP: 5 SOLUTION/ DROPS OPHTHALMIC at 09:30

## 2023-09-26 RX ADMIN — IOHEXOL 75 ML: 350 INJECTION, SOLUTION INTRAVENOUS at 10:19

## 2023-09-26 ASSESSMENT — FIBROSIS 4 INDEX: FIB4 SCORE: 0.76

## 2023-09-26 NOTE — ED TRIAGE NOTES
"Leo Medellin  37 y.o.  Male  Ambulatory to triage for     Chief Complaint   Patient presents with    Loss of Vision     Pt reports he woke up this morning with \"some blurry vision\" and jumped in the shower and \"my right eye went dark then came back like when I get a head rush or someone turns the lights on in a dark room.  Then it went away and when I could see, there were colors that looked like a pupil moving in middle of my vision and was blurry\".  Pt reports visual disturbance has resolved but his eye \"feels like it is adjusting to the light\".    Headache     Pt woke up with headache.  Reports frontal headache.  Pt reports mild nausea but states \"it my usual stuff\"     Pt reports he is currently 4-5 days into taking ABX for strep throat with some doses missed.  Neuro intact,  strength strong/= bilaterally.  Pt reports DMII and recently taken off PO medications because his sugars have been good.  NAD    BP (!) 168/105   Pulse 92   Temp 36.3 °C (97.4 °F) (Temporal)   Resp 16   Ht 1.702 m (5' 7\")   Wt 90.4 kg (199 lb 4.7 oz)   SpO2 99%   BMI 31.21 kg/m²      in triage.  "

## 2023-09-26 NOTE — ED NOTES
Pt provided discharge instructions including follow up for vision with ophthalmologist, PCP for blood pressure and diabetes, and when to return to ER. Pt voiced understanding, all questions answered.  PIV removed intact, pt detached from all monitoring.VSS, pt remains hypertensive at baseline.  Pt left department with all belongings, ambulated with steady gait.

## 2023-09-26 NOTE — DISCHARGE INSTRUCTIONS
PLEASE SHOW TO OPHTHALMOLOGIST:    Patient has a history of diabetes, hypertension.  Has been having visual disturbances and floaters in his vision.  At this time visual acuity is preserved, CTA demonstrates no acute findings.  Needs further ophthalmologic evaluation for macular and retinal health.  This can be done outpatient as there is no acute visual derangement.    Visual acuity was 20/40 both, 20/50 right, 20/50 left.  Pressure in the eye was 14 in the left and 15 on the right

## 2023-09-26 NOTE — ED PROVIDER NOTES
"ED Provider Note    Scribed for Leslee Barajas M.D. by Rachel Coronado. 9/26/2023, 9:07 AM.    Primary care provider: CRISTIAN Hamilton  Means of arrival: Walk-in  History obtained from: Patient  History limited by: None    CHIEF COMPLAINT  Chief Complaint   Patient presents with    Loss of Vision     Pt reports he woke up this morning with \"some blurry vision\" and jumped in the shower and \"my right eye went dark then came back like when I get a head rush or someone turns the lights on in a dark room.  Then it went away and when I could see, there were colors that looked like a pupil moving in middle of my vision and was blurry\".  Pt reports visual disturbance has resolved but his eye \"feels like it is adjusting to the light\".    Headache     Pt woke up with headache.  Reports frontal headache.  Pt reports mild nausea but states \"it my usual stuff\"       HPI/DEUCE  Leo Medellin is a 37 y.o. male who presents to the Emergency Department for evaluation of blurry vision onset this morning. The patient states that he woke up with a frontal headache and a \"head rush.\" He describes the head rush as only being in his right eye which is unusual for him. He reports his vision became blurry then he saw flashes with \"red and green circles.\" He states his eye still felt \"weird\" and his headache continued prompting him to present to the ED. he states his vision is now back to baseline but he still has a mild throbbing headache.  Patient states that he has had episodes of these \"head rushes\" and blurred vision in the past.  He is a diabetic and followed up with [facility] approximately 1 year ago and was advised there was some abnormality and he would need follow-up again in 6 months.  However he did not follow-up. The patient states he has a history of high blood pressure, diabetes,  pancreatitis, cirrhosis, tinnitus, and gastritis. He reports that he has been sober from alcohol for 6 years. " "    EXTERNAL RECORDS REVIEWED  None pertinent     LIMITATION TO HISTORY   Select: : None    OUTSIDE HISTORIAN(S):  None      PAST MEDICAL HISTORY   has a past medical history of Alcohol abuse, Alcohol abuse, Alcoholic cirrhosis (HCC), Anxiety, Asthma, Depression, ETOH abuse, Gastritis, Hypertension, Liver failure (HCC), Pancreatitis, and Ulcer.    SURGICAL HISTORY   has a past surgical history that includes appendectomy and appendectomy.    SOCIAL HISTORY  Social History     Tobacco Use    Smoking status: Former     Types: Cigarettes    Smokeless tobacco: Never   Vaping Use    Vaping Use: Every day    Substances: THC   Substance Use Topics    Alcohol use: Not Currently     Comment: 4/7/2021 quit    Drug use: Yes     Types: Inhaled     Comment: lianne daily      Social History     Substance and Sexual Activity   Drug Use Yes    Types: Inhaled    Comment: maragurpreet daily       FAMILY HISTORY  Family History   Problem Relation Age of Onset    Diabetes Brother     Hypertension Mother        CURRENT MEDICATIONS  Home Medications    **Home medications have not yet been reviewed for this encounter**         ALLERGIES  No Known Allergies    PHYSICAL EXAM  VITAL SIGNS: BP (!) 168/105   Pulse 92   Temp 36.3 °C (97.4 °F) (Temporal)   Resp 16   Ht 1.702 m (5' 7\")   Wt 90.4 kg (199 lb 4.7 oz)   SpO2 99%   BMI 31.21 kg/m²   Vitals reviewed by myself.  Physical Exam  Nursing note and vitals reviewed.  Constitutional: Well-developed and well-nourished. No distress.   HENT: Head is normocephalic and atraumatic. Oropharynx is clear and moist without exudate or erythema.   Eyes:Pupils are equal, round, and reactive to light. No horizontal or vertical nystagmus. Conjunctiva are normal.  Visual acuity is 20/40 in both eyes, 20/50 in the left and 20/50 in the right.  Eye pressure is 15 in the right and 14 in the left.  Anterior chambers are deep and quiet bilaterally.  Fluorescein staining reveals no conjunctival " uptake  Cardiovascular: Normal rate and regular rhythm. No murmur heard. Normal radial pulses.  Pulmonary/Chest: Breath sounds normal. No wheezes or rales.   Abdominal: Soft and non-tender. No distention.    Musculoskeletal: Extremities exhibit normal range of motion without edema or tenderness. Patient ambulates with a normal narrow-based steady gait.   Neurological: Awake, alert and oriented to person, place, and time. No focal deficits noted. Cranial nerves II - XII intact. No pronator drift.  No dysmetria on cerebellar testing. Normal speech and language. Normal strength and sensation in bilateral upper and lower extremities.   Skin: Skin is warm and dry. No rash.   Psychiatric: Normal mood and affect. Appropriate for clinical situation.     DIAGNOSTIC STUDIES:  LABS  Labs Reviewed   COMP METABOLIC PANEL - Abnormal; Notable for the following components:       Result Value    Glucose 171 (*)     All other components within normal limits   POCT GLUCOSE DEVICE RESULTS - Abnormal; Notable for the following components:    POC Glucose, Blood 228 (*)     All other components within normal limits   CBC WITH DIFFERENTIAL   ESTIMATED GFR       All labs reviewed and independently interpreted by myself    RADIOLOGY    Final interpretation by radiology demonstrates:    CT-CTA HEAD WITH & W/O-POST PROCESS   Final Result      CT angiogram of the [place] within normal limits.        The radiologist's interpretation of all radiological studies have been reviewed by me.    COURSE & MEDICAL DECISION MAKING    ED Observation Status? Yes; I am placing the patient in to an observation status due to a diagnostic uncertainty as well as therapeutic intensity. Patient placed in observation status at 9:11 AM, 9/26/2023.     Observation plan is as follows: Monitor for symptom management and diagnostic results.     Upon Reevaluation, the patient's condition has: Improved; and will be discharged.    Patient discharged from ED  Observation status at 11:14 AM (Time) 9/26/2023 (Date).     INITIAL ASSESSMENT AND PLAN    Patient is a 37-year-old male who comes in for evaluation of blurred vision and headache.  Differential diagnosis includes intracranial abnormality, vascular abnormality, macular degeneration, vitreous hemorrhage. At this time low suspicion for retinal detachment as patient's vision is preserved and is back to baseline.  Eye pressures are normal bilaterally and pupils are equal and reactive, no suspicion for acute glaucoma.  Patient has had a similar episodes in the past this is likely chronic however will obtain baseline labs, and CT of the head assess for acute pathology, patient is amenable to this plan.       REASSESSMENTS     9:13 AM Patient presents to the ED with vision loss onset this morning. Patient will be treated with medication. Ordered for labs and imaging to evaluate his symptoms.      9:27 AM I performed an eye exam at this time.    10:52 AM Patient was reevaluated at bedside. Discussed lab and radiology results with the patient and informed them that everything looks normal. Patient reevaluated at bedside.  Discussed plan for discharge, including plan for follow-up, and informed them to return to the [facility] with any new or worsening symptoms. Patient was given the opportunity for questions, and I addressed all questions or concerns. He is stable for discharge at this time. Patient verbalizes understanding and support with my plan for discharge.    ER COURSE AND FINAL DISPOSITION   Patient's initial vitals are notable for hypertension.  He is neurologically intact on exam.  Labs returned and are independently interpreted by myself to demonstrate:  -No leukocytosis or anemia  noted on CBC  -Electrolytes and renal function are within normal limits  -Patient is slightly hyperglycemic, likely secondary to diabetes, however anion gap and bicarb are normal, he is not in DKA  -Labs are otherwise unremarkable    CTA  returns and demonstrates no acute intracranial pathology.  Therefore at this time patient is reassured and advised on the importance of close follow-up with ophthalmology given his underlying diabetes and hypertension.  I have placed referral for outpatient follow-up.  Patient is amenable to this plan.  He is given strict return precautions and discharged in stable condition.    ADDITIONAL PROBLEM LIST AND RESOURCE UTILIZATION    Additional problems aside from the chief complaint that I have addressed: None    I have discussed management of the patient with the following physicians and LUCRETIA's: None    Discussion of management with other Hospitals in [place] or appropriate source(s): None     Escalation of care considered, and ultimately not performed: See above.     Barriers to care at this time, including but not limited to:  None .     Decision tools and prescription drugs considered including, but not limited to: See above.    Please see review of records as noted above  The patient will return for new or worsening symptoms and is stable at the time of discharge.    The patient is referred to a primary physician for blood pressure management, diabetic screening, and for all other preventative health concerns.    DISPOSITION:  Patient will be discharged home in stable condition.    FOLLOW UP:  Antony Arellano M.D.  [id number]  [facility] [address]  [phone]      Call to schedule a follow-up appointment with this doctor for your visual disturbances.      FINAL IMPRESSION  1. Nonintractable headache, unspecified chronicity pattern, unspecified headache type    2. Blurred vision          Rachel GONZALEZ (Elizabeth), am scribing for, and in the presence of, Leslee Barajas M.D..    Electronically signed by: Rachel Coronado ([email or web address]), 9/26/2023    Leslee GONZALEZ M.D. personally performed the services described in this documentation, as scribed by Rachel Coronado in my presence, and it is both accurate and complete.    The  note accurately reflects work and decisions made by me.  Leslee Barajas M.D.  9/26/2023  1:16 PM

## 2023-12-13 ENCOUNTER — HOSPITAL ENCOUNTER (EMERGENCY)
Facility: MEDICAL CENTER | Age: 38
End: 2023-12-13
Attending: EMERGENCY MEDICINE
Payer: MEDICAID

## 2023-12-13 VITALS
TEMPERATURE: 97.6 F | WEIGHT: 196.65 LBS | SYSTOLIC BLOOD PRESSURE: 131 MMHG | BODY MASS INDEX: 30.87 KG/M2 | OXYGEN SATURATION: 95 % | DIASTOLIC BLOOD PRESSURE: 75 MMHG | HEART RATE: 80 BPM | RESPIRATION RATE: 18 BRPM | HEIGHT: 67 IN

## 2023-12-13 DIAGNOSIS — R10.13 EPIGASTRIC PAIN: ICD-10-CM

## 2023-12-13 LAB
ALBUMIN SERPL BCP-MCNC: 4.1 G/DL (ref 3.2–4.9)
ALBUMIN/GLOB SERPL: 1.4 G/DL
ALP SERPL-CCNC: 93 U/L (ref 30–99)
ALT SERPL-CCNC: 29 U/L (ref 2–50)
ANION GAP SERPL CALC-SCNC: 11 MMOL/L (ref 7–16)
AST SERPL-CCNC: 15 U/L (ref 12–45)
BASOPHILS # BLD AUTO: 0.6 % (ref 0–1.8)
BASOPHILS # BLD: 0.05 K/UL (ref 0–0.12)
BILIRUB SERPL-MCNC: 0.3 MG/DL (ref 0.1–1.5)
BUN SERPL-MCNC: 12 MG/DL (ref 8–22)
CALCIUM ALBUM COR SERPL-MCNC: 8.8 MG/DL (ref 8.5–10.5)
CALCIUM SERPL-MCNC: 8.9 MG/DL (ref 8.5–10.5)
CHLORIDE SERPL-SCNC: 107 MMOL/L (ref 96–112)
CO2 SERPL-SCNC: 22 MMOL/L (ref 20–33)
CREAT SERPL-MCNC: 0.83 MG/DL (ref 0.5–1.4)
EOSINOPHIL # BLD AUTO: 0.14 K/UL (ref 0–0.51)
EOSINOPHIL NFR BLD: 1.5 % (ref 0–6.9)
ERYTHROCYTE [DISTWIDTH] IN BLOOD BY AUTOMATED COUNT: 46 FL (ref 35.9–50)
GFR SERPLBLD CREATININE-BSD FMLA CKD-EPI: 114 ML/MIN/1.73 M 2
GLOBULIN SER CALC-MCNC: 2.9 G/DL (ref 1.9–3.5)
GLUCOSE BLD STRIP.AUTO-MCNC: 115 MG/DL (ref 65–99)
GLUCOSE SERPL-MCNC: 114 MG/DL (ref 65–99)
HCT VFR BLD AUTO: 48.4 % (ref 42–52)
HGB BLD-MCNC: 16.5 G/DL (ref 14–18)
IMM GRANULOCYTES # BLD AUTO: 0.04 K/UL (ref 0–0.11)
IMM GRANULOCYTES NFR BLD AUTO: 0.4 % (ref 0–0.9)
LIPASE SERPL-CCNC: 71 U/L (ref 11–82)
LYMPHOCYTES # BLD AUTO: 3.45 K/UL (ref 1–4.8)
LYMPHOCYTES NFR BLD: 38 % (ref 22–41)
MCH RBC QN AUTO: 30.8 PG (ref 27–33)
MCHC RBC AUTO-ENTMCNC: 34.1 G/DL (ref 32.3–36.5)
MCV RBC AUTO: 90.5 FL (ref 81.4–97.8)
MONOCYTES # BLD AUTO: 0.66 K/UL (ref 0–0.85)
MONOCYTES NFR BLD AUTO: 7.3 % (ref 0–13.4)
NEUTROPHILS # BLD AUTO: 4.75 K/UL (ref 1.82–7.42)
NEUTROPHILS NFR BLD: 52.2 % (ref 44–72)
NRBC # BLD AUTO: 0 K/UL
NRBC BLD-RTO: 0 /100 WBC (ref 0–0.2)
PLATELET # BLD AUTO: 248 K/UL (ref 164–446)
PMV BLD AUTO: 12 FL (ref 9–12.9)
POTASSIUM SERPL-SCNC: 4.2 MMOL/L (ref 3.6–5.5)
PROT SERPL-MCNC: 7 G/DL (ref 6–8.2)
RBC # BLD AUTO: 5.35 M/UL (ref 4.7–6.1)
SODIUM SERPL-SCNC: 140 MMOL/L (ref 135–145)
WBC # BLD AUTO: 9.1 K/UL (ref 4.8–10.8)

## 2023-12-13 PROCEDURE — 85025 COMPLETE CBC W/AUTO DIFF WBC: CPT

## 2023-12-13 PROCEDURE — 82962 GLUCOSE BLOOD TEST: CPT

## 2023-12-13 PROCEDURE — 83690 ASSAY OF LIPASE: CPT

## 2023-12-13 PROCEDURE — 700102 HCHG RX REV CODE 250 W/ 637 OVERRIDE(OP): Performed by: EMERGENCY MEDICINE

## 2023-12-13 PROCEDURE — 99284 EMERGENCY DEPT VISIT MOD MDM: CPT

## 2023-12-13 PROCEDURE — A9270 NON-COVERED ITEM OR SERVICE: HCPCS | Performed by: EMERGENCY MEDICINE

## 2023-12-13 PROCEDURE — 80053 COMPREHEN METABOLIC PANEL: CPT

## 2023-12-13 PROCEDURE — 36415 COLL VENOUS BLD VENIPUNCTURE: CPT

## 2023-12-13 RX ORDER — FAMOTIDINE 20 MG/1
20 TABLET, FILM COATED ORAL ONCE
Status: COMPLETED | OUTPATIENT
Start: 2023-12-13 | End: 2023-12-13

## 2023-12-13 RX ORDER — FAMOTIDINE 20 MG/1
20 TABLET, FILM COATED ORAL 2 TIMES DAILY
Qty: 28 TABLET | Refills: 0 | Status: SHIPPED | OUTPATIENT
Start: 2023-12-13 | End: 2023-12-27

## 2023-12-13 RX ORDER — SUCRALFATE 1 G/1
1 TABLET ORAL
Qty: 56 TABLET | Refills: 0 | Status: SHIPPED | OUTPATIENT
Start: 2023-12-13 | End: 2023-12-27

## 2023-12-13 RX ADMIN — FAMOTIDINE 20 MG: 20 TABLET ORAL at 10:14

## 2023-12-13 RX ADMIN — LIDOCAINE HYDROCHLORIDE 30 ML: 20 SOLUTION ORAL; TOPICAL at 10:14

## 2023-12-13 ASSESSMENT — FIBROSIS 4 INDEX: FIB4 SCORE: 0.56

## 2023-12-13 ASSESSMENT — PAIN DESCRIPTION - DESCRIPTORS: DESCRIPTORS: ACHING

## 2023-12-13 NOTE — ED TRIAGE NOTES
"Chief Complaint   Patient presents with    Abdominal Pain     Pt having upper abdominal pain 4/10, pt states he had been diagnosed with cirrhosis of the liver before and stopped drinking but has recently relapsed to drinking once a week. Pt still has his gallbladder, pt states he is supposed to be on bp meds and metformin but has not been taking them.       BP (!) 152/104   Pulse 90   Temp 36.4 °C (97.6 °F) (Axillary)   Resp 16   Ht 1.702 m (5' 7\")   Wt 89.2 kg (196 lb 10.4 oz)   SpO2 98%   BMI 30.80 kg/m²     "

## 2023-12-13 NOTE — ED PROVIDER NOTES
ED Provider Note    CHIEF COMPLAINT  Chief Complaint   Patient presents with    Abdominal Pain     Pt having upper abdominal pain 4/10, pt states he had been diagnosed with cirrhosis of the liver before and stopped drinking but has recently relapsed to drinking once a week. Pt still has his gallbladder, pt states he is supposed to be on bp meds and metformin but has not been taking them.       EXTERNAL RECORDS REVIEWED  ER encounter for headache, abdominal discomfort, nausea.    HPI/ROS  LIMITATION TO HISTORY   Select: : None  OUTSIDE HISTORIAN(S):  none    [place] Meliton Medellin is a 37 y.o. male with a past medical history of hypertension, diabetes, prior pancreatitis, cirrhosis diagnosis and gastritis with some intermittent alcohol use with recent relapse who presents to the emergency room for concerns regarding some upper abdominal discomfort.  Discharge  CIWA yesterday and felt like it was a dull ache in the upper portion of his abdomen, this was not radiating, was not associated with any exertional activity and had no chest discomfort, no shortness of breath.  He has had burning sensations in the middle of his chest sometimes worse in the morning however this is not a regular issue for him.  He was concerned about the possibility of this being his gallbladder and says that he stopped drinking a long time ago, recently relapsed with some stress and finds that he is drinking 1-2 beers every 3 or 4 days.  He does notice that this is been worsening in the amount of stress and feels like the abdominal pain has been worse several days after drinking.  He additionally notes that he has been on blood pressure medications and metformin in the past and is reestablishing with his primary care doctor on December 20 to get these represcribed.,  Chills, cough or bodyaches and no abdominal bloating, skin lesions or urinary symptoms.    PAST MEDICAL HISTORY   has a past medical history of Alcohol abuse, Alcohol abuse, Alcoholic  "cirrhosis (HCC), Anxiety, Asthma, Depression, ETOH abuse, Gastritis, Hypertension, Liver failure (HCC), Pancreatitis, and Ulcer.    SURGICAL HISTORY   has a past surgical history that includes appendectomy and appendectomy.    FAMILY HISTORY  Family History   Problem Relation Age of Onset    Diabetes Brother     Hypertension Mother        SOCIAL HISTORY  Social History     Tobacco Use    Smoking status: Former     Types: Cigarettes    Smokeless tobacco: Never   Vaping Use    Vaping Use: Every day    Substances: THC   Substance and Sexual Activity    Alcohol use: Not Currently     Comment: 4/7/2021 quit    Drug use: Yes     Types: Inhaled     Comment: katheringurpreet daily    Sexual activity: Not on file       CURRENT MEDICATIONS  Home Medications       Reviewed by Joce Whiteside R.N. (Registered Nurse) on 12/13/23 at 0846  Med List Status: Not Addressed     Medication Last Dose Status   amLODIPine (NORVASC) 10 MG Tab  Active   Amoxicillin 500 MG Tab  Active   atorvastatin (LIPITOR) 40 MG Tab  Active   DULoxetine (CYMBALTA) 60 MG Cap DR Particles delayed-release capsule  Active   lisinopril (PRINIVIL) 40 MG tablet  Active   metFORMIN ER (GLUCOPHAGE XR) 500 MG TABLET SR 24 HR  Active   metoprolol SR (TOPROL XL) 25 MG TABLET SR 24 HR  Active   naproxen (NAPROSYN) 375 MG Tab  Active                    ALLERGIES  No Known Allergies    PHYSICAL EXAM  VITAL SIGNS: /75   Pulse 80   Temp 36.4 °C (97.6 °F) (Temporal)   Resp 18   Ht 1.702 m (5' 7\")   Wt 89.2 kg (196 lb 10.4 oz)   SpO2 95%   BMI 30.80 kg/m²    Genl: M sitting in chair comfortably, speaking clearly, appears in no acute distress   Head: NC/AT   ENT: Mucous membranes moist, posterior pharynx clear, uvula midline, nares patent bilaterally  Pulmonary: Lungs are clear to auscultation bilaterally  Chest: No TTP  CV:  RRR, no murmur appreciated, pulses 2+ in both upper and lower extremities  Abdomen: soft, strict discomfort, no true Barajas sign, no McBurney's " point tenderness, no sequela of cirrhosis. ND; no rebound/guarding, no masses palpated, no HSM   : no CVA or suprapubic tenderness   Musculoskeletal: Pain free ROM of the neck. Moving upper and lower extremities in spontaneous and coordinated fashion  Neuro: A&Ox4 (person, place, time, situation), speech fluent, gait steady, no focal deficits appreciated  Skin: No rash or lesions.  No pallor or jaundice.  No cyanosis.  Warm and dry.     DIAGNOSTIC STUDIES / PROCEDURES    LABS  Labs Reviewed   COMP METABOLIC PANEL - Abnormal; Notable for the following components:       Result Value    Glucose 114 (*)     All other components within normal limits   POCT GLUCOSE DEVICE RESULTS - Abnormal; Notable for the following components:    POC Glucose, Blood 115 (*)     All other components within normal limits   CBC WITH DIFFERENTIAL   LIPASE   ESTIMATED GFR     COURSE & MEDICAL DECISION MAKING    ED Observation Status? No; Patient does not meet criteria for ED Observation.     INITIAL ASSESSMENT, COURSE AND PLAN  Epigastric abdominal pain: PUD, pancreatitis, gastritis, GERD, cholelithiasis, cholecystitis, choledocolithiasis      Care Narrative: Seen and evaluated for symptoms as described above.  The patient is alert, oriented, has had problems with some abdominal discomfort in the past, notes that this been worsening over the last several days since he has started drinking intermittently again.  He does not have regional tenderness beyond epigastric discomfort with no pinpoint tenderness in the right upper quadrant or left lower quadrant, no diarrhea or urinary symptoms and no fluid wave or evidence of ascites.  He had blood work obtained.    Shows no evidence of hepatobiliary obstructive pathology, no evidence of pancreatitis, there is no respiratory symptoms that would necessitate a chest x-ray, there is no evidence of gross abdominal distention hands of progression to peritonitis and with a normal set of vitals within  normal white count I do not think further diagnostic workup is indicated.  At this time the patient will be treated for likely gastritis secondary to resumption of alcohol use.  He is counseled on multiple resources and he feels empowered to do so.  He is not overly hypertensive, his blood sugar is reassuring at this time and there is no kidney dysfunction.  Medications for his chronic hyperglycemia and hypertension will be handled by his outpatient appointment that he has in a week.  Strict return precautions are discussed and he is treated with GI cocktail, Pepcid here, will be started on Carafate and Pepcid in the outpatient setting.    Discharged Home in stable condition.    DISPOSITION AND DISCUSSIONS  I have discussed management of the patient with the following physicians and LUCRETIA's:  none    Discussion of management with other [facility] or appropriate source(s): None     Escalation of care considered, and ultimately not performed:diagnostic imaging      FINAL DIAGNOSIS  1. Epigastric pain           Electronically signed by: Yeison Portillo M.D., 12/13/2023 10:04 AM

## 2023-12-13 NOTE — ED NOTES
Ambulates to room with steady gait. Changed into a gown. Call light within reach. Instructed to call staff for assistance.

## 2024-03-27 ENCOUNTER — HOSPITAL ENCOUNTER (EMERGENCY)
Facility: MEDICAL CENTER | Age: 39
End: 2024-03-27
Attending: EMERGENCY MEDICINE
Payer: MEDICAID

## 2024-03-27 VITALS
OXYGEN SATURATION: 94 % | BODY MASS INDEX: 32.15 KG/M2 | WEIGHT: 204.81 LBS | TEMPERATURE: 98.4 F | DIASTOLIC BLOOD PRESSURE: 103 MMHG | HEIGHT: 67 IN | RESPIRATION RATE: 16 BRPM | SYSTOLIC BLOOD PRESSURE: 152 MMHG | HEART RATE: 100 BPM

## 2024-03-27 DIAGNOSIS — B34.9 VIRAL SYNDROME: ICD-10-CM

## 2024-03-27 DIAGNOSIS — J06.9 UPPER RESPIRATORY TRACT INFECTION, UNSPECIFIED TYPE: ICD-10-CM

## 2024-03-27 LAB
FLUAV RNA SPEC QL NAA+PROBE: NEGATIVE
FLUBV RNA SPEC QL NAA+PROBE: NEGATIVE
RSV RNA SPEC QL NAA+PROBE: NEGATIVE
SARS-COV-2 RNA RESP QL NAA+PROBE: NOTDETECTED

## 2024-03-27 PROCEDURE — 0241U HCHG SARS-COV-2 COVID-19 NFCT DS RESP RNA 4 TRGT ED POC: CPT

## 2024-03-27 PROCEDURE — 99282 EMERGENCY DEPT VISIT SF MDM: CPT

## 2024-03-27 ASSESSMENT — PAIN DESCRIPTION - PAIN TYPE: TYPE: ACUTE PAIN

## 2024-03-27 ASSESSMENT — FIBROSIS 4 INDEX: FIB4 SCORE: 0.44

## 2024-03-27 NOTE — ED PROVIDER NOTES
ER Provider Note    Scribed for Wang Coats M.D. by Robel Beltrán. 3/27/2024   11:52 AM    Primary Care Provider: CRISTIAN Hamilton    CHIEF COMPLAINT  Chief Complaint   Patient presents with    Flu Like Symptoms     X 5 days. Fatigue, nausea, HA, sore throat.      EXTERNAL RECORDS REVIEWED  Outpatient Notes shows that the patient was seen here in the ED on 12/2023 for epigastric pain.     HPI/ROS  LIMITATION TO HISTORY   Select: : None  OUTSIDE HISTORIAN(S):  None    [place] Meliton Medellin is a 38 y.o. male who presents to the ED for evaluation of acute generalized weakness onset four days ago. He began having flu like symptoms a few days, and notes his symptoms gradually worsening, prompting him to the ED today. Patient has associated nausea, headache, sore throat. He has diarrhea, but notes having a history of gastritis, and that it can sometimes cause him diarrhea. Denies any vomiting, cough, congestion, or runny nose. Denies any history of asthma, but does use an albuterol inhaler as needed. No known drug allergies.     PAST MEDICAL HISTORY  Past Medical History:   Diagnosis Date    Alcohol abuse     Alcohol abuse     5-10 beers    Alcoholic cirrhosis (HCC)     Anxiety     Asthma     Depression     ETOH abuse     Gastritis     Hypertension     Liver failure (HCC)     Pancreatitis     Ulcer     unsure if abdominal/intestinal       SURGICAL HISTORY  Past Surgical History:   Procedure Laterality Date    APPENDECTOMY      [place] APPENDECTOMY         FAMILY HISTORY  Family History   Problem Relation Age of Onset    Diabetes Brother     Hypertension Mother        SOCIAL HISTORY   reports that he has quit smoking. His smoking use included cigarettes. He has never used smokeless tobacco. He reports that he does not currently use alcohol. He reports current drug use. Drug: Inhaled.    CURRENT MEDICATIONS  Previous Medications    AMLODIPINE (NORVASC) 10 MG TAB    Take 1 tablet by mouth every day.     "AMOXICILLIN 500 MG TAB    Take 1 Tablet by mouth 2 times a day.    ATORVASTATIN (LIPITOR) 40 MG TAB    Take 1 Tablet by mouth every evening.    DULOXETINE (CYMBALTA) 60 MG CAP DR PARTICLES DELAYED-RELEASE CAPSULE    Take 60 mg by mouth at bedtime. Indications: Major Depressive Disorder    LISINOPRIL (PRINIVIL) 40 MG TABLET    Take 1 tablet by mouth every day.    METFORMIN ER (GLUCOPHAGE XR) 500 MG TABLET SR 24 HR    Take 1 tablet by mouth every day.    METOPROLOL SR (TOPROL XL) 25 MG TABLET SR 24 HR    Take 25 mg by mouth every day.    NAPROXEN (NAPROSYN) 375 MG TAB    Take 1 Tablet by mouth 2 times a day with meals.       ALLERGIES  No Known Allergies     PHYSICAL EXAM  BP (!) 159/108   Pulse (!) 105   Temp 36.9 °C (98.5 °F) (Temporal)   Resp 16   Ht 1.702 m (5' 7\")   Wt 92.9 kg (204 lb 12.9 oz)   SpO2 96%   BMI 32.08 kg/m²      Nursing note and vitals reviewed.  Constitutional: Well-developed and well-nourished. No distress.   HENT: Head is normocephalic and atraumatic. Oropharynx is clear and moist without exudate or erythema.   Eyes: Pupils are equal, round, and reactive to light. Conjunctiva are normal.   Cardiovascular: Normal rate and regular rhythm. No murmur heard. Normal radial pulses.  Pulmonary/Chest: Breath sounds normal. No wheezes or rales.   Abdominal: Soft and non-tender. No distention    Musculoskeletal: Extremities exhibit normal range of motion without edema or tenderness.   Neurological: Awake, alert and oriented to person, place, and time. No focal deficits noted.  Skin: Skin is warm and dry. No rash.   Psychiatric: Normal mood and affect. Appropriate for clinical situation      DIAGNOSTIC STUDIES    Labs:   Results for orders placed or performed during the hospital encounter of 03/27/24   POC CoV-2, FLU A/B, RSV by PCR   Result Value Ref Range    POC Influenza A RNA, PCR Negative Negative    POC Influenza B RNA, PCR Negative Negative    POC RSV, by PCR Negative Negative    POC " SARS-CoV-2, PCR NotDetected         INITIAL ASSESSMENT AND PLAN    11:52 AM - Patient was seen and evaluated at bedside. Patient presents to the ED for flu like symptoms including generalized tiredness, nausea, diarrhea, and sore throat. After my exam, I discussed with the patient the plan of care, which includes treating the patient with medication for their symptoms, as well as obtaining lab work for further evaluation. Patient understands and verbalizes agreement to plan of care. Lab results will be up on MyChart. Discussed at home care, including sending the patient home with prescriptions for medications. Patient understands and verbalizes agreement to plan of care. They had the opportunity to ask questions. No further questions or concerns at this time. I then informed the patient of my plan for discharge, which includes strict return precautions for any new or worsening symptoms. Patient understands and verbalizes agreement to plan of care. Patient is comfortable going home at this time.      ED Observation Status? No; Patient does not meet criteria for ED Observation.      COURSE AND MEDICAL DECISION MAKING    The patient presents today with signs and symptoms consistent with a viral upper respiratory infection. They have a normal pulse oximetry on room air and a normal pulmonary exam. Therefore, I feel that the likelihood of pneumonia is low. This patient does not demonstrate any clinical evidence of pneumonia, meningitis, appendicitis, or other acute medical emergency. Overall, the patient is very well appearing. I do not feel that this patient would benefit from antibiotics at this time. I have recommended Tylenol  for fever.      HTN/IDDM FOLLOW UP:  The patient has known hypertension and is being followed by their primary care doctor      DISPOSITION AND DISCUSSIONS    I have discussed management of the patient with the following physicians and LUCRETIA's:  None    Discussion of management with other [facility] or  appropriate source(s): None     Escalation of care considered, and ultimately not performed: diagnostic imaging.    Patient will be discharged home.    FOLLOW UP:  CRISTIAN Hamilton  [address] [facility] [id number] [phone]    Schedule an appointment as soon as possible for a visit       [facility], Emergency Dept  [address] [facility] [address] [phone]    If symptoms worsen      FINAL DIAGNOSIS  1. Upper respiratory tract infection, unspecified type    2. Viral syndrome         Robel GONZALEZ (Scribe), am scribing for, and in the presence of, Wang Coats M.D..    Electronically signed by: Robel Beltrán (Scribe), 3/27/2024    Wang GONZALEZ M.D. personally performed the services described in this documentation, as scribed by Robel Beltrán in my presence, and it is both accurate and complete.      The note accurately reflects work and decisions made by me.  Wang Coats M.D.  3/27/2024  4:18 PM

## 2024-03-27 NOTE — ED NOTES
Pt discharged to home. Pt was given follow up instructions. Pt verbalized understanding of all instructions for discharge and is ambulatory out of ED with steady gait. AOx4

## 2024-03-27 NOTE — ED TRIAGE NOTES
.  Chief Complaint   Patient presents with    Flu Like Symptoms     X 5 days. Fatigue, nausea, HA, sore throat.       Pt ambulate to triage with above complaint.   Pt educated on triage process and returned to lobby.

## 2024-04-14 ENCOUNTER — APPOINTMENT (OUTPATIENT)
Dept: RADIOLOGY | Facility: MEDICAL CENTER | Age: 39
End: 2024-04-14
Attending: EMERGENCY MEDICINE

## 2024-04-14 ENCOUNTER — HOSPITAL ENCOUNTER (EMERGENCY)
Facility: MEDICAL CENTER | Age: 39
End: 2024-04-14
Attending: EMERGENCY MEDICINE

## 2024-04-14 VITALS
OXYGEN SATURATION: 94 % | WEIGHT: 206.35 LBS | TEMPERATURE: 98 F | HEIGHT: 67 IN | HEART RATE: 89 BPM | BODY MASS INDEX: 32.39 KG/M2 | SYSTOLIC BLOOD PRESSURE: 132 MMHG | DIASTOLIC BLOOD PRESSURE: 88 MMHG | RESPIRATION RATE: 16 BRPM

## 2024-04-14 DIAGNOSIS — R73.9 HYPERGLYCEMIA: ICD-10-CM

## 2024-04-14 DIAGNOSIS — R10.9 ABDOMINAL PAIN, UNSPECIFIED ABDOMINAL LOCATION: ICD-10-CM

## 2024-04-14 LAB
ALBUMIN SERPL BCP-MCNC: 4.4 G/DL (ref 3.2–4.9)
ALBUMIN/GLOB SERPL: 1.6 G/DL
ALP SERPL-CCNC: 125 U/L (ref 30–99)
ALT SERPL-CCNC: 92 U/L (ref 2–50)
ANION GAP SERPL CALC-SCNC: 13 MMOL/L (ref 7–16)
APPEARANCE UR: CLEAR
AST SERPL-CCNC: 36 U/L (ref 12–45)
BASOPHILS # BLD AUTO: 0.5 % (ref 0–1.8)
BASOPHILS # BLD: 0.05 K/UL (ref 0–0.12)
BILIRUB SERPL-MCNC: 0.2 MG/DL (ref 0.1–1.5)
BILIRUB UR QL STRIP.AUTO: NEGATIVE
BUN SERPL-MCNC: 12 MG/DL (ref 8–22)
CALCIUM ALBUM COR SERPL-MCNC: 8.7 MG/DL (ref 8.5–10.5)
CALCIUM SERPL-MCNC: 9 MG/DL (ref 8.5–10.5)
CHLORIDE SERPL-SCNC: 103 MMOL/L (ref 96–112)
CO2 SERPL-SCNC: 20 MMOL/L (ref 20–33)
COLOR UR: YELLOW
CREAT SERPL-MCNC: 1.35 MG/DL (ref 0.5–1.4)
EOSINOPHIL # BLD AUTO: 0.21 K/UL (ref 0–0.51)
EOSINOPHIL NFR BLD: 2 % (ref 0–6.9)
ERYTHROCYTE [DISTWIDTH] IN BLOOD BY AUTOMATED COUNT: 42.5 FL (ref 35.9–50)
GFR SERPLBLD CREATININE-BSD FMLA CKD-EPI: 68 ML/MIN/1.73 M 2
GLOBULIN SER CALC-MCNC: 2.7 G/DL (ref 1.9–3.5)
GLUCOSE SERPL-MCNC: 238 MG/DL (ref 65–99)
GLUCOSE UR STRIP.AUTO-MCNC: 500 MG/DL
HCT VFR BLD AUTO: 48.9 % (ref 42–52)
HGB BLD-MCNC: 17 G/DL (ref 14–18)
IMM GRANULOCYTES # BLD AUTO: 0.06 K/UL (ref 0–0.11)
IMM GRANULOCYTES NFR BLD AUTO: 0.6 % (ref 0–0.9)
KETONES UR STRIP.AUTO-MCNC: NEGATIVE MG/DL
LEUKOCYTE ESTERASE UR QL STRIP.AUTO: NEGATIVE
LIPASE SERPL-CCNC: 105 U/L (ref 11–82)
LYMPHOCYTES # BLD AUTO: 3.38 K/UL (ref 1–4.8)
LYMPHOCYTES NFR BLD: 32 % (ref 22–41)
MCH RBC QN AUTO: 31.1 PG (ref 27–33)
MCHC RBC AUTO-ENTMCNC: 34.8 G/DL (ref 32.3–36.5)
MCV RBC AUTO: 89.4 FL (ref 81.4–97.8)
MICRO URNS: ABNORMAL
MONOCYTES # BLD AUTO: 0.77 K/UL (ref 0–0.85)
MONOCYTES NFR BLD AUTO: 7.3 % (ref 0–13.4)
NEUTROPHILS # BLD AUTO: 6.1 K/UL (ref 1.82–7.42)
NEUTROPHILS NFR BLD: 57.6 % (ref 44–72)
NITRITE UR QL STRIP.AUTO: NEGATIVE
NRBC # BLD AUTO: 0 K/UL
NRBC BLD-RTO: 0 /100 WBC (ref 0–0.2)
PH UR STRIP.AUTO: 6.5 [PH] (ref 5–8)
PLATELET # BLD AUTO: 260 K/UL (ref 164–446)
PMV BLD AUTO: 11.4 FL (ref 9–12.9)
POTASSIUM SERPL-SCNC: 4 MMOL/L (ref 3.6–5.5)
PROT SERPL-MCNC: 7.1 G/DL (ref 6–8.2)
PROT UR QL STRIP: NEGATIVE MG/DL
RBC # BLD AUTO: 5.47 M/UL (ref 4.7–6.1)
RBC UR QL AUTO: NEGATIVE
SODIUM SERPL-SCNC: 136 MMOL/L (ref 135–145)
SP GR UR STRIP.AUTO: >=1.045
UROBILINOGEN UR STRIP.AUTO-MCNC: 0.2 MG/DL
WBC # BLD AUTO: 10.6 K/UL (ref 4.8–10.8)

## 2024-04-14 PROCEDURE — 96374 THER/PROPH/DIAG INJ IV PUSH: CPT

## 2024-04-14 PROCEDURE — 83690 ASSAY OF LIPASE: CPT

## 2024-04-14 PROCEDURE — 99285 EMERGENCY DEPT VISIT HI MDM: CPT

## 2024-04-14 PROCEDURE — 700117 HCHG RX CONTRAST REV CODE 255: Performed by: EMERGENCY MEDICINE

## 2024-04-14 PROCEDURE — 700111 HCHG RX REV CODE 636 W/ 250 OVERRIDE (IP): Performed by: EMERGENCY MEDICINE

## 2024-04-14 PROCEDURE — 74177 CT ABD & PELVIS W/CONTRAST: CPT

## 2024-04-14 PROCEDURE — 80053 COMPREHEN METABOLIC PANEL: CPT

## 2024-04-14 PROCEDURE — 700105 HCHG RX REV CODE 258: Performed by: EMERGENCY MEDICINE

## 2024-04-14 PROCEDURE — 81003 URINALYSIS AUTO W/O SCOPE: CPT

## 2024-04-14 PROCEDURE — 85025 COMPLETE CBC W/AUTO DIFF WBC: CPT

## 2024-04-14 PROCEDURE — 36415 COLL VENOUS BLD VENIPUNCTURE: CPT

## 2024-04-14 RX ORDER — KETOROLAC TROMETHAMINE 15 MG/ML
15 INJECTION, SOLUTION INTRAMUSCULAR; INTRAVENOUS ONCE
Status: COMPLETED | OUTPATIENT
Start: 2024-04-14 | End: 2024-04-14

## 2024-04-14 RX ORDER — PANTOPRAZOLE SODIUM 40 MG/1
40 TABLET, DELAYED RELEASE ORAL DAILY
Qty: 30 TABLET | Refills: 0 | Status: SHIPPED | OUTPATIENT
Start: 2024-04-14 | End: 2024-04-17

## 2024-04-14 RX ORDER — SODIUM CHLORIDE, SODIUM LACTATE, POTASSIUM CHLORIDE, CALCIUM CHLORIDE 600; 310; 30; 20 MG/100ML; MG/100ML; MG/100ML; MG/100ML
1000 INJECTION, SOLUTION INTRAVENOUS ONCE
Status: COMPLETED | OUTPATIENT
Start: 2024-04-14 | End: 2024-04-14

## 2024-04-14 RX ORDER — NAPROXEN 500 MG/1
500 TABLET ORAL 2 TIMES DAILY WITH MEALS
Qty: 10 TABLET | Refills: 0 | Status: SHIPPED | OUTPATIENT
Start: 2024-04-14 | End: 2024-04-17

## 2024-04-14 RX ADMIN — KETOROLAC TROMETHAMINE 15 MG: 15 INJECTION, SOLUTION INTRAMUSCULAR; INTRAVENOUS at 16:20

## 2024-04-14 RX ADMIN — SODIUM CHLORIDE, POTASSIUM CHLORIDE, SODIUM LACTATE AND CALCIUM CHLORIDE 1000 ML: 600; 310; 30; 20 INJECTION, SOLUTION INTRAVENOUS at 15:49

## 2024-04-14 RX ADMIN — IOHEXOL 100 ML: 350 INJECTION, SOLUTION INTRAVENOUS at 16:07

## 2024-04-14 ASSESSMENT — FIBROSIS 4 INDEX: FIB4 SCORE: 0.44

## 2024-04-14 NOTE — Clinical Note
Leo Medellin was seen and treated in our emergency department on 4/14/2024.  He may return to work on 04/15/2024.       If you have any questions or concerns, please don't hesitate to call.      Brayan Snyder M.D.

## 2024-04-14 NOTE — ED PROVIDER NOTES
ED Provider Note    CHIEF COMPLAINT  Chief Complaint   Patient presents with    Abdominal Pain       EXTERNAL RECORDS REVIEWED  Inpatient Notes admission in February 2022 with abdominal pain, noted to be hyperglycemic has had multiple emergency department visits for similar    HPI/ROS  LIMITATION TO HISTORY   Select: : None  OUTSIDE HISTORIAN(S):  none    Leo Medellin is a 38 y.o. male who presents abdominal pain.  Patient reports he has had pain for around 4 days it does seem to come and go, is primarily there when he coughs.  The pain is around the right mid abdomen without radiation.  He states he does cough frequently because he smokes marijuana daily, no changes to his cough, no productive cough, no chest pain or shortness of breath, no fevers or chills.  He did have 1 episode of vomiting 4 days ago but none since.  No nausea.  No diarrhea.  No blood in his stool or dark tarry stool.    He additionally reports history of alcohol use and diabetes although he does not take any medication    PAST MEDICAL HISTORY   has a past medical history of Alcohol abuse, Alcohol abuse, Alcoholic cirrhosis (HCC), Anxiety, Asthma, Depression, ETOH abuse, Gastritis, Hypertension, Liver failure (HCC), Pancreatitis, and Ulcer.    SURGICAL HISTORY   has a past surgical history that includes appendectomy and appendectomy.    FAMILY HISTORY  Family History   Problem Relation Age of Onset    Diabetes Brother     Hypertension Mother        SOCIAL HISTORY  Social History     Tobacco Use    Smoking status: Former     Types: Cigarettes    Smokeless tobacco: Never   Vaping Use    Vaping Use: Every day    Substances: THC   Substance and Sexual Activity    Alcohol use: Not Currently     Comment: 4/7/2021 quit    Drug use: Yes     Types: Inhaled     Comment: lianne daily    Sexual activity: Not on file       CURRENT MEDICATIONS  Home Medications       Reviewed by Annalee Snyder R.N. (Registered Nurse) on 04/14/24 at 1703  Med List  "Status: Partial     Medication Last Dose Status   amLODIPine (NORVASC) 10 MG Tab  Active   Amoxicillin 500 MG Tab  Active   atorvastatin (LIPITOR) 40 MG Tab  Active   DULoxetine (CYMBALTA) 60 MG Cap DR Particles delayed-release capsule  Active   lisinopril (PRINIVIL) 40 MG tablet  Active   metFORMIN ER (GLUCOPHAGE XR) 500 MG TABLET SR 24 HR  Active   metoprolol SR (TOPROL XL) 25 MG TABLET SR 24 HR  Active   naproxen (NAPROSYN) 375 MG Tab  Active                    ALLERGIES  No Known Allergies    PHYSICAL EXAM  VITAL SIGNS: /88   Pulse 89   Temp 36.7 °C (98 °F)   Resp 16   Ht 1.702 m (5' 7\")   Wt 93.6 kg (206 lb 5.6 oz)   SpO2 94%   BMI 32.32 kg/m²      Pulse ox interpretation: I interpret this pulse ox as normal.  Constitutional: Alert in no apparent distress.  HENT: No signs of trauma, Bilateral external ears normal, Nose normal.   Eyes: Pupils are equal and reactive, Conjunctiva normal, Non-icteric.   Neck: Normal range of motion, No tenderness, Supple, No stridor.   Cardiovascular: Regular rate and rhythm, no murmurs.   Thorax & Lungs: Normal breath sounds, No respiratory distress, No wheezing, No chest tenderness.   Abdomen:Soft, mild right mid abdomen tenderness, no obvious hernia defect, no masses, No pulsatile masses. No peritoneal signs.  Skin: Warm, Dry, No erythema, No rash.   Back: No bony tenderness, No CVA tenderness.   Extremities: Intact distal pulses, No edema, No tenderness, No cyanosis,    Musculoskeletal: Good range of motion in all major joints. No tenderness to palpation or major deformities noted.   Neurologic: Alert , Normal motor function, Normal sensory function, No focal deficits noted.   Psychiatric: Affect normal, Judgment normal, Mood normal.               EKG/LABS  Labs Reviewed   COMP METABOLIC PANEL - Abnormal; Notable for the following components:       Result Value    Glucose 238 (*)     ALT(SGPT) 92 (*)     Alkaline Phosphatase 125 (*)     All other components within " normal limits   LIPASE - Abnormal; Notable for the following components:    Lipase 105 (*)     All other components within normal limits   URINALYSIS - Abnormal; Notable for the following components:    Specific Gravity >=1.045 (*)     Glucose 500 (*)     All other components within normal limits   CBC WITH DIFFERENTIAL   ESTIMATED GFR       I have independently interpreted this EKG    RADIOLOGY/PROCEDURES   I have independently interpreted the diagnostic imaging associated with this visit and am waiting the final reading from the radiologist.   My preliminary interpretation is as follows: No obstruction    Radiologist interpretation:  CT-ABDOMEN-PELVIS WITH   Final Result      1.  No acute abnormality in the abdomen or pelvis.   2.  Hepatic steatosis.          COURSE & MEDICAL DECISION MAKING    ASSESSMENT, COURSE AND PLAN  Care Narrative: 3:41 PM  Patient is evaluated the bedside and chart is reviewed.  At this point differential includes pancreatitis, electrolyte or metabolic derangement, ureteral stone, given strain, seems less likely to be biliary colic or cholecystitis given the location of his pain and exam although this is considered.  I have ordered for diagnostic labs, IV fluids, CT    Patient is reevaluated, he is feeling improved.  Updated on all results, discussed findings with him.  He is comfortable discharge      Hydration: Based on the patient's presentation of Inability to take oral fluids the patient was given IV fluids. IV Hydration was used because oral hydration was not as rapid as required. Upon recheck following hydration, the patient was improved.          PROBLEMS MANAGED  # Abdominal pain.  This does seem to be more in the abdominal wall given the nature of his pain and his exam here.  I did obtain a CTA that shows no finding of acute intra-abdominal process, no findings of biliary stones or inflammatory process either.  His lipase is 100, and where his pain is in the nature is not suggest  pancreatitis.  Will prescribe Naprosyn, as he has had some issues with gastritis in the past we will additionally prescribe Protonix     # Hyperglycemia.  Patient's blood sugar was noted to be over 200 although there is no gap acidosis.  He reports he has been following with his primary care and he is overall been under good control without need for medications.  He will follow-up with primary care for this as well I would not institute medications based on 1 reading today      # Alcohol use.  Patient is counseled on cessation, he states he does go to [facility] and is well-connected therefore his therapy sessions and further treatment for this    DISPOSITION AND DISCUSSIONS    Barriers to care at this time, including but not limited to:  none .     Decision tools and prescription drugs considered including, but not limited to: Medication modification as below .    The patient will return for new or worsening symptoms and is stable at the time of discharge.    The patient is referred to a primary physician for blood pressure management, diabetic screening, and for all other preventative health concerns.        DISPOSITION:  Patient will be discharged home in stable condition.    FOLLOW UP:  CRISTIAN Hamilton  [address] [facility] [id number] [phone]            OUTPATIENT MEDICATIONS:  New Prescriptions    NAPROXEN (NAPROSYN) 500 MG TAB    Take 1 Tablet by mouth 2 times a day with meals for 5 days.    PANTOPRAZOLE (PROTONIX) 40 MG TABLET DELAYED RESPONSE    Take 1 Tablet by mouth every day.         FINAL DIAGNOSIS  1. Abdominal pain, unspecified abdominal location    2. Hyperglycemia           Electronically signed by: Brayan Snyder M.D., 4/14/2024 3:25 PM

## 2024-04-14 NOTE — ED TRIAGE NOTES
".  Chief Complaint   Patient presents with    Abdominal Pain     Pt amb to triage for above. RLQ ab pain x 4 days. States pain happens when coughs or sneezes. Pt describes the pain as a ' stinging fire, sharp pain' 10/10 pain.   +n/v.   Pt endorses alcohol use recently. Pt states relapsed 3 months ago after not drinking for 6 years.   Pt states \"hx of cirrhosis and pancreatic issues\"  Aox4, GCS 15 and speaking in complete sentences. Protocol ordered. PT educated on triage process and to alert staff if anything changes.   "

## 2024-04-15 NOTE — DISCHARGE INSTRUCTIONS
Please take the medicine for your stomach prescribed, as we are putting you on an anti-inflammatory medicine to help protect your stomach as well.  Continue to work on stopping drinking.  Follow-up with your primary care as we discussed for continued monitoring of your blood sugar

## 2024-04-17 ENCOUNTER — HOSPITAL ENCOUNTER (OUTPATIENT)
Facility: MEDICAL CENTER | Age: 39
End: 2024-04-19
Attending: EMERGENCY MEDICINE | Admitting: HOSPITALIST
Payer: MEDICAID

## 2024-04-17 ENCOUNTER — APPOINTMENT (OUTPATIENT)
Dept: CARDIOLOGY | Facility: MEDICAL CENTER | Age: 39
End: 2024-04-17
Attending: HOSPITALIST
Payer: MEDICAID

## 2024-04-17 ENCOUNTER — APPOINTMENT (OUTPATIENT)
Dept: RADIOLOGY | Facility: MEDICAL CENTER | Age: 39
End: 2024-04-17
Attending: EMERGENCY MEDICINE
Payer: MEDICAID

## 2024-04-17 DIAGNOSIS — R07.9 CHEST PAIN, UNSPECIFIED TYPE: ICD-10-CM

## 2024-04-17 DIAGNOSIS — K64.9 HEMORRHOIDS, UNSPECIFIED HEMORRHOID TYPE: ICD-10-CM

## 2024-04-17 DIAGNOSIS — I16.0 HYPERTENSIVE URGENCY: ICD-10-CM

## 2024-04-17 DIAGNOSIS — E78.2 MIXED HYPERLIPIDEMIA: ICD-10-CM

## 2024-04-17 DIAGNOSIS — E11.9 TYPE 2 DIABETES MELLITUS WITHOUT COMPLICATION, WITHOUT LONG-TERM CURRENT USE OF INSULIN (HCC): ICD-10-CM

## 2024-04-17 DIAGNOSIS — R73.9 HYPERGLYCEMIA: ICD-10-CM

## 2024-04-17 PROBLEM — K92.1 BLOODY STOOL: Status: ACTIVE | Noted: 2024-04-17

## 2024-04-17 PROBLEM — K70.10 ALCOHOLIC HEPATITIS WITHOUT ASCITES: Status: ACTIVE | Noted: 2024-04-17

## 2024-04-17 PROBLEM — F10.10 ALCOHOL ABUSE: Status: ACTIVE | Noted: 2024-04-17

## 2024-04-17 PROBLEM — R74.8 ELEVATED LIPASE: Status: ACTIVE | Noted: 2024-04-17

## 2024-04-17 LAB
ABO + RH BLD: NORMAL
ABO GROUP BLD: NORMAL
ALBUMIN SERPL BCP-MCNC: 4.5 G/DL (ref 3.2–4.9)
ALBUMIN/GLOB SERPL: 1.5 G/DL
ALP SERPL-CCNC: 132 U/L (ref 30–99)
ALT SERPL-CCNC: 136 U/L (ref 2–50)
ANION GAP SERPL CALC-SCNC: 11 MMOL/L (ref 7–16)
APTT PPP: 25.4 SEC (ref 24.7–36)
AST SERPL-CCNC: 61 U/L (ref 12–45)
BASOPHILS # BLD AUTO: 0.6 % (ref 0–1.8)
BASOPHILS # BLD: 0.06 K/UL (ref 0–0.12)
BILIRUB SERPL-MCNC: 0.3 MG/DL (ref 0.1–1.5)
BLD GP AB SCN SERPL QL: NORMAL
BUN SERPL-MCNC: 11 MG/DL (ref 8–22)
CALCIUM ALBUM COR SERPL-MCNC: 8.8 MG/DL (ref 8.5–10.5)
CALCIUM SERPL-MCNC: 9.2 MG/DL (ref 8.5–10.5)
CHLORIDE SERPL-SCNC: 103 MMOL/L (ref 96–112)
CO2 SERPL-SCNC: 23 MMOL/L (ref 20–33)
CREAT SERPL-MCNC: 0.79 MG/DL (ref 0.5–1.4)
EKG IMPRESSION: NORMAL
EOSINOPHIL # BLD AUTO: 0.1 K/UL (ref 0–0.51)
EOSINOPHIL NFR BLD: 1 % (ref 0–6.9)
ERYTHROCYTE [DISTWIDTH] IN BLOOD BY AUTOMATED COUNT: 41.8 FL (ref 35.9–50)
EST. AVERAGE GLUCOSE BLD GHB EST-MCNC: 183 MG/DL
GFR SERPLBLD CREATININE-BSD FMLA CKD-EPI: 116 ML/MIN/1.73 M 2
GLOBULIN SER CALC-MCNC: 3 G/DL (ref 1.9–3.5)
GLUCOSE BLD STRIP.AUTO-MCNC: 158 MG/DL (ref 65–99)
GLUCOSE BLD STRIP.AUTO-MCNC: 219 MG/DL (ref 65–99)
GLUCOSE SERPL-MCNC: 221 MG/DL (ref 65–99)
HBA1C MFR BLD: 8 % (ref 4–5.6)
HCT VFR BLD AUTO: 46.4 % (ref 42–52)
HGB BLD-MCNC: 16.2 G/DL (ref 14–18)
IMM GRANULOCYTES # BLD AUTO: 0.08 K/UL (ref 0–0.11)
IMM GRANULOCYTES NFR BLD AUTO: 0.8 % (ref 0–0.9)
INR PPP: 0.92 (ref 0.87–1.13)
LIPASE SERPL-CCNC: 159 U/L (ref 11–82)
LYMPHOCYTES # BLD AUTO: 2.97 K/UL (ref 1–4.8)
LYMPHOCYTES NFR BLD: 28.6 % (ref 22–41)
MCH RBC QN AUTO: 30.6 PG (ref 27–33)
MCHC RBC AUTO-ENTMCNC: 34.9 G/DL (ref 32.3–36.5)
MCV RBC AUTO: 87.7 FL (ref 81.4–97.8)
MONOCYTES # BLD AUTO: 0.53 K/UL (ref 0–0.85)
MONOCYTES NFR BLD AUTO: 5.1 % (ref 0–13.4)
NEUTROPHILS # BLD AUTO: 6.65 K/UL (ref 1.82–7.42)
NEUTROPHILS NFR BLD: 63.9 % (ref 44–72)
NRBC # BLD AUTO: 0 K/UL
NRBC BLD-RTO: 0 /100 WBC (ref 0–0.2)
PLATELET # BLD AUTO: 242 K/UL (ref 164–446)
PMV BLD AUTO: 12 FL (ref 9–12.9)
POTASSIUM SERPL-SCNC: 4.1 MMOL/L (ref 3.6–5.5)
PROT SERPL-MCNC: 7.5 G/DL (ref 6–8.2)
PROTHROMBIN TIME: 12.4 SEC (ref 12–14.6)
RBC # BLD AUTO: 5.29 M/UL (ref 4.7–6.1)
RH BLD: NORMAL
SODIUM SERPL-SCNC: 137 MMOL/L (ref 135–145)
TROPONIN T SERPL-MCNC: <6 NG/L (ref 6–19)
TSH SERPL DL<=0.005 MIU/L-ACNC: 1.75 UIU/ML (ref 0.38–5.33)
WBC # BLD AUTO: 10.4 K/UL (ref 4.8–10.8)

## 2024-04-17 PROCEDURE — 85730 THROMBOPLASTIN TIME PARTIAL: CPT

## 2024-04-17 PROCEDURE — 700117 HCHG RX CONTRAST REV CODE 255: Performed by: HOSPITALIST

## 2024-04-17 PROCEDURE — 700102 HCHG RX REV CODE 250 W/ 637 OVERRIDE(OP): Performed by: HOSPITALIST

## 2024-04-17 PROCEDURE — 86901 BLOOD TYPING SEROLOGIC RH(D): CPT

## 2024-04-17 PROCEDURE — 93005 ELECTROCARDIOGRAM TRACING: CPT | Performed by: EMERGENCY MEDICINE

## 2024-04-17 PROCEDURE — 96376 TX/PRO/DX INJ SAME DRUG ADON: CPT

## 2024-04-17 PROCEDURE — 86900 BLOOD TYPING SEROLOGIC ABO: CPT

## 2024-04-17 PROCEDURE — 85610 PROTHROMBIN TIME: CPT

## 2024-04-17 PROCEDURE — 700111 HCHG RX REV CODE 636 W/ 250 OVERRIDE (IP): Performed by: HOSPITALIST

## 2024-04-17 PROCEDURE — 99285 EMERGENCY DEPT VISIT HI MDM: CPT

## 2024-04-17 PROCEDURE — 86850 RBC ANTIBODY SCREEN: CPT

## 2024-04-17 PROCEDURE — 83036 HEMOGLOBIN GLYCOSYLATED A1C: CPT

## 2024-04-17 PROCEDURE — 96375 TX/PRO/DX INJ NEW DRUG ADDON: CPT

## 2024-04-17 PROCEDURE — 700111 HCHG RX REV CODE 636 W/ 250 OVERRIDE (IP): Performed by: EMERGENCY MEDICINE

## 2024-04-17 PROCEDURE — 36415 COLL VENOUS BLD VENIPUNCTURE: CPT

## 2024-04-17 PROCEDURE — G0378 HOSPITAL OBSERVATION PER HR: HCPCS

## 2024-04-17 PROCEDURE — 99223 1ST HOSP IP/OBS HIGH 75: CPT | Performed by: HOSPITALIST

## 2024-04-17 PROCEDURE — 84484 ASSAY OF TROPONIN QUANT: CPT

## 2024-04-17 PROCEDURE — 80053 COMPREHEN METABOLIC PANEL: CPT

## 2024-04-17 PROCEDURE — 82962 GLUCOSE BLOOD TEST: CPT | Mod: 91

## 2024-04-17 PROCEDURE — 83690 ASSAY OF LIPASE: CPT

## 2024-04-17 PROCEDURE — 85025 COMPLETE CBC W/AUTO DIFF WBC: CPT

## 2024-04-17 PROCEDURE — 93306 TTE W/DOPPLER COMPLETE: CPT

## 2024-04-17 PROCEDURE — 96374 THER/PROPH/DIAG INJ IV PUSH: CPT

## 2024-04-17 PROCEDURE — A9270 NON-COVERED ITEM OR SERVICE: HCPCS | Performed by: HOSPITALIST

## 2024-04-17 PROCEDURE — 71045 X-RAY EXAM CHEST 1 VIEW: CPT

## 2024-04-17 PROCEDURE — 700105 HCHG RX REV CODE 258: Performed by: EMERGENCY MEDICINE

## 2024-04-17 PROCEDURE — 84443 ASSAY THYROID STIM HORMONE: CPT

## 2024-04-17 RX ORDER — LORAZEPAM 0.5 MG/1
0.5 TABLET ORAL EVERY 4 HOURS PRN
Status: DISCONTINUED | OUTPATIENT
Start: 2024-04-17 | End: 2024-04-19 | Stop reason: HOSPADM

## 2024-04-17 RX ORDER — ACETAMINOPHEN 325 MG/1
650 TABLET ORAL EVERY 6 HOURS PRN
Status: DISCONTINUED | OUTPATIENT
Start: 2024-04-17 | End: 2024-04-19 | Stop reason: HOSPADM

## 2024-04-17 RX ORDER — LORAZEPAM 2 MG/ML
1 INJECTION INTRAMUSCULAR
Status: DISCONTINUED | OUTPATIENT
Start: 2024-04-17 | End: 2024-04-19 | Stop reason: HOSPADM

## 2024-04-17 RX ORDER — DEXTROSE MONOHYDRATE 25 G/50ML
25 INJECTION, SOLUTION INTRAVENOUS
Status: DISCONTINUED | OUTPATIENT
Start: 2024-04-17 | End: 2024-04-19 | Stop reason: HOSPADM

## 2024-04-17 RX ORDER — SODIUM CHLORIDE 9 MG/ML
1000 INJECTION, SOLUTION INTRAVENOUS ONCE
Status: COMPLETED | OUTPATIENT
Start: 2024-04-17 | End: 2024-04-17

## 2024-04-17 RX ORDER — LABETALOL HYDROCHLORIDE 5 MG/ML
20 INJECTION, SOLUTION INTRAVENOUS ONCE
Status: COMPLETED | OUTPATIENT
Start: 2024-04-17 | End: 2024-04-17

## 2024-04-17 RX ORDER — AMITRIPTYLINE HYDROCHLORIDE 25 MG/1
50 TABLET, FILM COATED ORAL NIGHTLY
Status: DISCONTINUED | OUTPATIENT
Start: 2024-04-17 | End: 2024-04-19 | Stop reason: HOSPADM

## 2024-04-17 RX ORDER — PROMETHAZINE HYDROCHLORIDE 25 MG/1
12.5-25 SUPPOSITORY RECTAL EVERY 4 HOURS PRN
Status: DISCONTINUED | OUTPATIENT
Start: 2024-04-17 | End: 2024-04-19 | Stop reason: HOSPADM

## 2024-04-17 RX ORDER — LABETALOL HYDROCHLORIDE 5 MG/ML
10 INJECTION, SOLUTION INTRAVENOUS EVERY 4 HOURS PRN
Status: DISCONTINUED | OUTPATIENT
Start: 2024-04-17 | End: 2024-04-19 | Stop reason: HOSPADM

## 2024-04-17 RX ORDER — LOSARTAN POTASSIUM 50 MG/1
50 TABLET ORAL
Status: DISCONTINUED | OUTPATIENT
Start: 2024-04-17 | End: 2024-04-18

## 2024-04-17 RX ORDER — HYDRALAZINE HYDROCHLORIDE 20 MG/ML
10 INJECTION INTRAMUSCULAR; INTRAVENOUS ONCE
Status: COMPLETED | OUTPATIENT
Start: 2024-04-17 | End: 2024-04-17

## 2024-04-17 RX ORDER — LORAZEPAM 1 MG/1
1 TABLET ORAL EVERY 4 HOURS PRN
Status: DISCONTINUED | OUTPATIENT
Start: 2024-04-17 | End: 2024-04-19 | Stop reason: HOSPADM

## 2024-04-17 RX ORDER — ONDANSETRON 2 MG/ML
4 INJECTION INTRAMUSCULAR; INTRAVENOUS ONCE
Status: DISCONTINUED | OUTPATIENT
Start: 2024-04-17 | End: 2024-04-17

## 2024-04-17 RX ORDER — OMEPRAZOLE 20 MG/1
20 CAPSULE, DELAYED RELEASE ORAL 2 TIMES DAILY
Status: DISCONTINUED | OUTPATIENT
Start: 2024-04-17 | End: 2024-04-19 | Stop reason: HOSPADM

## 2024-04-17 RX ORDER — PROMETHAZINE HYDROCHLORIDE 25 MG/1
12.5-25 TABLET ORAL EVERY 4 HOURS PRN
Status: DISCONTINUED | OUTPATIENT
Start: 2024-04-17 | End: 2024-04-19 | Stop reason: HOSPADM

## 2024-04-17 RX ORDER — ENOXAPARIN SODIUM 100 MG/ML
40 INJECTION SUBCUTANEOUS DAILY
Status: DISCONTINUED | OUTPATIENT
Start: 2024-04-17 | End: 2024-04-19 | Stop reason: HOSPADM

## 2024-04-17 RX ORDER — LORAZEPAM 1 MG/1
4 TABLET ORAL
Status: DISCONTINUED | OUTPATIENT
Start: 2024-04-17 | End: 2024-04-19 | Stop reason: HOSPADM

## 2024-04-17 RX ORDER — PROCHLORPERAZINE EDISYLATE 5 MG/ML
5-10 INJECTION INTRAMUSCULAR; INTRAVENOUS EVERY 4 HOURS PRN
Status: DISCONTINUED | OUTPATIENT
Start: 2024-04-17 | End: 2024-04-19 | Stop reason: HOSPADM

## 2024-04-17 RX ORDER — AMITRIPTYLINE HYDROCHLORIDE 50 MG/1
50 TABLET, FILM COATED ORAL NIGHTLY
COMMUNITY

## 2024-04-17 RX ORDER — GAUZE BANDAGE 2" X 2"
100 BANDAGE TOPICAL DAILY
Status: DISCONTINUED | OUTPATIENT
Start: 2024-04-17 | End: 2024-04-19 | Stop reason: HOSPADM

## 2024-04-17 RX ORDER — MORPHINE SULFATE 4 MG/ML
4 INJECTION INTRAVENOUS ONCE
Status: DISCONTINUED | OUTPATIENT
Start: 2024-04-17 | End: 2024-04-17

## 2024-04-17 RX ORDER — LORAZEPAM 1 MG/1
2 TABLET ORAL
Status: DISCONTINUED | OUTPATIENT
Start: 2024-04-17 | End: 2024-04-19 | Stop reason: HOSPADM

## 2024-04-17 RX ORDER — HYDRALAZINE HYDROCHLORIDE 20 MG/ML
10 INJECTION INTRAMUSCULAR; INTRAVENOUS EVERY 4 HOURS PRN
Status: DISCONTINUED | OUTPATIENT
Start: 2024-04-17 | End: 2024-04-17

## 2024-04-17 RX ORDER — ONDANSETRON 4 MG/1
4 TABLET, ORALLY DISINTEGRATING ORAL EVERY 4 HOURS PRN
Status: DISCONTINUED | OUTPATIENT
Start: 2024-04-17 | End: 2024-04-19 | Stop reason: HOSPADM

## 2024-04-17 RX ORDER — LORAZEPAM 2 MG/ML
2 INJECTION INTRAMUSCULAR
Status: DISCONTINUED | OUTPATIENT
Start: 2024-04-17 | End: 2024-04-19 | Stop reason: HOSPADM

## 2024-04-17 RX ORDER — ONDANSETRON 2 MG/ML
4 INJECTION INTRAMUSCULAR; INTRAVENOUS EVERY 4 HOURS PRN
Status: DISCONTINUED | OUTPATIENT
Start: 2024-04-17 | End: 2024-04-19 | Stop reason: HOSPADM

## 2024-04-17 RX ORDER — LORAZEPAM 1 MG/1
3 TABLET ORAL
Status: DISCONTINUED | OUTPATIENT
Start: 2024-04-17 | End: 2024-04-19 | Stop reason: HOSPADM

## 2024-04-17 RX ORDER — LORAZEPAM 2 MG/ML
1.5 INJECTION INTRAMUSCULAR
Status: DISCONTINUED | OUTPATIENT
Start: 2024-04-17 | End: 2024-04-19 | Stop reason: HOSPADM

## 2024-04-17 RX ORDER — LORAZEPAM 2 MG/ML
0.5 INJECTION INTRAMUSCULAR EVERY 4 HOURS PRN
Status: DISCONTINUED | OUTPATIENT
Start: 2024-04-17 | End: 2024-04-19 | Stop reason: HOSPADM

## 2024-04-17 RX ADMIN — HYDRALAZINE HYDROCHLORIDE 10 MG: 20 INJECTION, SOLUTION INTRAMUSCULAR; INTRAVENOUS at 10:35

## 2024-04-17 RX ADMIN — OMEPRAZOLE 20 MG: 20 CAPSULE, DELAYED RELEASE ORAL at 21:35

## 2024-04-17 RX ADMIN — LOSARTAN POTASSIUM 50 MG: 50 TABLET, FILM COATED ORAL at 14:34

## 2024-04-17 RX ADMIN — Medication 100 MG: at 17:05

## 2024-04-17 RX ADMIN — HYDRALAZINE HYDROCHLORIDE 10 MG: 20 INJECTION, SOLUTION INTRAMUSCULAR; INTRAVENOUS at 14:16

## 2024-04-17 RX ADMIN — METOPROLOL TARTRATE 50 MG: 25 TABLET, FILM COATED ORAL at 17:49

## 2024-04-17 RX ADMIN — SODIUM CHLORIDE 1000 ML: 9 INJECTION, SOLUTION INTRAVENOUS at 08:46

## 2024-04-17 RX ADMIN — LABETALOL HYDROCHLORIDE 20 MG: 5 INJECTION INTRAVENOUS at 15:02

## 2024-04-17 RX ADMIN — HUMAN ALBUMIN MICROSPHERES AND PERFLUTREN 3 ML: 10; .22 INJECTION, SOLUTION INTRAVENOUS at 19:34

## 2024-04-17 RX ADMIN — AMITRIPTYLINE HYDROCHLORIDE 50 MG: 50 TABLET, FILM COATED ORAL at 20:59

## 2024-04-17 RX ADMIN — ACETAMINOPHEN 650 MG: 325 TABLET, FILM COATED ORAL at 18:35

## 2024-04-17 ASSESSMENT — PAIN DESCRIPTION - PAIN TYPE
TYPE: ACUTE PAIN

## 2024-04-17 ASSESSMENT — PATIENT HEALTH QUESTIONNAIRE - PHQ9
SUM OF ALL RESPONSES TO PHQ9 QUESTIONS 1 AND 2: 0
1. LITTLE INTEREST OR PLEASURE IN DOING THINGS: NOT AT ALL
1. LITTLE INTEREST OR PLEASURE IN DOING THINGS: NOT AT ALL
SUM OF ALL RESPONSES TO PHQ9 QUESTIONS 1 AND 2: 0
2. FEELING DOWN, DEPRESSED, IRRITABLE, OR HOPELESS: NOT AT ALL
2. FEELING DOWN, DEPRESSED, IRRITABLE, OR HOPELESS: NOT AT ALL

## 2024-04-17 ASSESSMENT — LIFESTYLE VARIABLES
AUDITORY DISTURBANCES: NOT PRESENT
ORIENTATION AND CLOUDING OF SENSORIUM: ORIENTED AND CAN DO SERIAL ADDITIONS
TREMOR: NO TREMOR
TREMOR: NO TREMOR
TOTAL SCORE: 0
TOTAL SCORE: 2
HOW MANY TIMES IN THE PAST YEAR HAVE YOU HAD 5 OR MORE DRINKS IN A DAY: 30
HAVE YOU EVER FELT YOU SHOULD CUT DOWN ON YOUR DRINKING: NO
AUDITORY DISTURBANCES: NOT PRESENT
EVER HAD A DRINK FIRST THING IN THE MORNING TO STEADY YOUR NERVES TO GET RID OF A HANGOVER: NO
EVER FELT BAD OR GUILTY ABOUT YOUR DRINKING: NO
AGITATION: NORMAL ACTIVITY
AGITATION: NORMAL ACTIVITY
AVERAGE NUMBER OF DAYS PER WEEK YOU HAVE A DRINK CONTAINING ALCOHOL: 4
NAUSEA AND VOMITING: NO NAUSEA AND NO VOMITING
VISUAL DISTURBANCES: NOT PRESENT
HEADACHE, FULLNESS IN HEAD: NOT PRESENT
ANXIETY: MILDLY ANXIOUS
HEADACHE, FULLNESS IN HEAD: VERY MILD
NAUSEA AND VOMITING: NO NAUSEA AND NO VOMITING
ORIENTATION AND CLOUDING OF SENSORIUM: ORIENTED AND CAN DO SERIAL ADDITIONS
CONSUMPTION TOTAL: POSITIVE
TOTAL SCORE: 1
ON A TYPICAL DAY WHEN YOU DRINK ALCOHOL HOW MANY DRINKS DO YOU HAVE: 2
PAROXYSMAL SWEATS: NO SWEAT VISIBLE
TOTAL SCORE: 0
VISUAL DISTURBANCES: NOT PRESENT
HAVE PEOPLE ANNOYED YOU BY CRITICIZING YOUR DRINKING: NO
ANXIETY: MILDLY ANXIOUS
PAROXYSMAL SWEATS: NO SWEAT VISIBLE
ALCOHOL_USE: YES
TOTAL SCORE: 0

## 2024-04-17 ASSESSMENT — ENCOUNTER SYMPTOMS
PSYCHIATRIC NEGATIVE: 1
MUSCULOSKELETAL NEGATIVE: 1
DIZZINESS: 1
NAUSEA: 1
RESPIRATORY NEGATIVE: 1
HEADACHES: 1

## 2024-04-17 ASSESSMENT — FIBROSIS 4 INDEX
FIB4 SCORE: 0.56
FIB4 SCORE: 0.84

## 2024-04-17 NOTE — ED NOTES
Erp informed of elevated BP of 180/94 HR=89 right arm; 1790/109 HR=83 Left arm. Reports some light headedness earlier but now is resolved but still has blurred vision. Pt states that he's been off of his Blood pressure medication for 10 months. erp made aware.

## 2024-04-17 NOTE — PROGRESS NOTES
Report received, pt care assumed. Pt to unit via wheelchair. Pt instructed on use of call light. Call light and personal belongings within reach of pt.

## 2024-04-17 NOTE — ED NOTES
Admitting MD at bedside, notified that pt remains hypertensive at 218/108 NF=[id number] after medicated with hydralazine.

## 2024-04-17 NOTE — ED TRIAGE NOTES
"Chief Complaint   Patient presents with    High Blood Sugar     Patient reports feeling fatigued with episodes of N/V. Patient also reports HA. FSBG 219 in triage. Patient reports he was taking metformin, but stopped taking it \"because my blood sugars have been ok\".    Bloody Stools     Patient reports the last few days he has noticed some blood when he has a bowel movement       39 yo male to triage for above complaint. Patient reports mostly bright red blood noted with bowel movements, but states it has occasionally been \"dark and thick like mucous\"  Protocol ordered    Pt is alert and oriented, speaking in full sentences, follows commands and responds appropriately to questions.     Patient placed back in lobby and educated on triage process. Asked to inform RN of any changes.    BP (!) 158/104   Pulse 92   Temp 36.6 °C (97.8 °F) (Temporal)   Resp 16   Ht 1.702 m (5' 7\")   Wt 94.8 kg (208 lb 15.9 oz)   SpO2 97%   BMI 32.73 kg/m²     "

## 2024-04-17 NOTE — ED PROVIDER NOTES
"ER Provider Note      Primary Care Provider: CRISTIAN Hamilton    CHIEF COMPLAINT   Chief Complaint   Patient presents with    High Blood Sugar     Patient reports feeling fatigued with episodes of N/V. Patient also reports HA. FSBG 219 in triage. Patient reports he was taking metformin, but stopped taking it \"because my blood sugars have been ok\".    Bloody Stools     Patient reports the last few days he has noticed some blood when he has a bowel movement       EXTERNAL RECORDS REVIEWED  Outpatient Notes   behavioral health, population health    HPI/ROS  LIMITATION TO HISTORY   Select: : None  OUTSIDE HISTORIAN(S):  None    Leo Medellin is a 38 y.o. male who presents to the ED complaining of here for evaluation of elevated blood sugar, in the 200s.  He has had some nausea vomiting, and left-sided chest pain.  Patient states he has no shortness of breath, the left-sided chest pain has been starting since today.  No pain radiation to the back. He has no fever or chills, and no vomiting.  Patient does have a hemorrhoid, states that when he has been using the bathroom to have a bowel movement does have some blood on the tissue paper.    PAST MEDICAL HISTORY  Past Medical History:   Diagnosis Date    Alcohol abuse     Alcohol abuse     5-10 beers    Alcoholic cirrhosis (HCC)     Anxiety     Asthma     Depression     ETOH abuse     Gastritis     Hypertension     Liver failure (HCC)     Pancreatitis     Ulcer     unsure if abdominal/intestinal       SURGICAL HISTORY  Past Surgical History:   Procedure Laterality Date    APPENDECTOMY      [place] APPENDECTOMY         FAMILY HISTORY  Family History   Problem Relation Age of Onset    Diabetes Brother     Hypertension Mother        SOCIAL HISTORY   reports that he has quit smoking. His smoking use included cigarettes. He has never used smokeless tobacco. He reports that he does not currently use alcohol. He reports current drug use. Drug: Inhaled.    CURRENT " "MEDICATIONS  Previous Medications    AMLODIPINE (NORVASC) 10 MG TAB    Take 1 tablet by mouth every day.    AMOXICILLIN 500 MG TAB    Take 1 Tablet by mouth 2 times a day.    ATORVASTATIN (LIPITOR) 40 MG TAB    Take 1 Tablet by mouth every evening.    DULOXETINE (CYMBALTA) 60 MG CAP DR PARTICLES DELAYED-RELEASE CAPSULE    Take 60 mg by mouth at bedtime. Indications: Major Depressive Disorder    LISINOPRIL (PRINIVIL) 40 MG TABLET    Take 1 tablet by mouth every day.    METFORMIN ER (GLUCOPHAGE XR) 500 MG TABLET SR 24 HR    Take 1 tablet by mouth every day.    METOPROLOL SR (TOPROL XL) 25 MG TABLET SR 24 HR    Take 25 mg by mouth every day.    NAPROXEN (NAPROSYN) 375 MG TAB    Take 1 Tablet by mouth 2 times a day with meals.    NAPROXEN (NAPROSYN) 500 MG TAB    Take 1 Tablet by mouth 2 times a day with meals for 5 days.    PANTOPRAZOLE (PROTONIX) 40 MG TABLET DELAYED RESPONSE    Take 1 Tablet by mouth every day.       ALLERGIES  No Known Allergies    PHYSICAL EXAM  BP (!) 166/103   Pulse (!) 102   Temp 36.6 °C (97.8 °F) (Temporal)   Resp 14   Ht 1.702 m (5' 7\")   Wt 94.8 kg (208 lb 15.9 oz)   SpO2 97%   BMI 32.73 kg/m²   Constitutional: Well developed, well nourished. No acute distress.  HEENT: Normocephalic, atraumatic. Posterior pharynx clear and moist.  Eyes:  EOMI. Normal sclera.  Neck: Supple, Full range of motion, nontender.  Chest/Pulmonary: clear to ausculation. Symmetrical expansion.   Cardio: Regular rate and rhythm with no murmur.   Abdomen: Soft, nontender. No peritoneal signs. No guarding. No palpable masses.  Rectal:  external hemorrhoid noted. No active bleeding.   Back: No CVA tenderness, nontender midline, no step offs.  Musculoskeletal: No deformity, no edema, neurovascular intact.   Neuro: Clear speech, appropriate, cooperative, cranial nerves II-XII grossly intact.  Psych: Normal mood and affect     DIAGNOSTIC STUDIES    EKG/LABS  Results for orders placed or performed during the hospital " encounter of 04/17/24   COD (ADULT)   Result Value Ref Range    ABO Grouping Only A     Rh Grouping Only POS     Antibody Screen-Cod NEG    CBC WITH DIFFERENTIAL   Result Value Ref Range    WBC 10.4 4.8 - 10.8 K/uL    RBC 5.29 4.70 - 6.10 M/uL    Hemoglobin 16.2 14.0 - 18.0 g/dL    Hematocrit 46.4 42.0 - 52.0 %    MCV 87.7 81.4 - 97.8 fL    MCH 30.6 27.0 - 33.0 pg    MCHC 34.9 32.3 - 36.5 g/dL    RDW 41.8 35.9 - 50.0 fL    Platelet Count 242 164 - 446 K/uL    MPV 12.0 9.0 - 12.9 fL    Neutrophils-Polys 63.90 44.00 - 72.00 %    Lymphocytes 28.60 22.00 - 41.00 %    Monocytes 5.10 0.00 - 13.40 %    Eosinophils 1.00 0.00 - 6.90 %    Basophils 0.60 0.00 - 1.80 %    Immature Granulocytes 0.80 0.00 - 0.90 %    Nucleated RBC 0.00 0.00 - 0.20 /100 WBC    Neutrophils (Absolute) 6.65 1.82 - 7.42 K/uL    Lymphs (Absolute) 2.97 1.00 - 4.80 K/uL    Monos (Absolute) 0.53 0.00 - 0.85 K/uL    Eos (Absolute) 0.10 0.00 - 0.51 K/uL    Baso (Absolute) 0.06 0.00 - 0.12 K/uL    Immature Granulocytes (abs) 0.08 0.00 - 0.11 K/uL    NRBC (Absolute) 0.00 K/uL   COMP METABOLIC PANEL   Result Value Ref Range    Sodium 137 135 - 145 mmol/L    Potassium 4.1 3.6 - 5.5 mmol/L    Chloride 103 96 - 112 mmol/L    Co2 23 20 - 33 mmol/L    Anion Gap 11.0 7.0 - 16.0    Glucose 221 (H) 65 - 99 mg/dL    Bun 11 8 - 22 mg/dL    Creatinine 0.79 0.50 - 1.40 mg/dL    Calcium 9.2 8.5 - 10.5 mg/dL    Correct Calcium 8.8 8.5 - 10.5 mg/dL    AST(SGOT) 61 (H) 12 - 45 U/L    ALT(SGPT) 136 (H) 2 - 50 U/L    Alkaline Phosphatase 132 (H) 30 - 99 U/L    Total Bilirubin 0.3 0.1 - 1.5 mg/dL    Albumin 4.5 3.2 - 4.9 g/dL    Total Protein 7.5 6.0 - 8.2 g/dL    Globulin 3.0 1.9 - 3.5 g/dL    A-G Ratio 1.5 g/dL   LIPASE   Result Value Ref Range    Lipase 159 (H) 11 - 82 U/L   PROTHROMBIN TIME   Result Value Ref Range    PT 12.4 12.0 - 14.6 sec    INR 0.92 0.87 - 1.13   APTT   Result Value Ref Range    APTT 25.4 24.7 - 36.0 sec   ESTIMATED GFR   Result Value Ref Range    GFR  (CKD-EPI) 116 >60 mL/min/1.73 m 2   TROPONIN   Result Value Ref Range    Troponin T <6 6 - 19 ng/L   EKG   Result Value Ref Range    Report       [facility] Emergency Dept.    Test Date:  2024  Pt Name:    JACKIE SMITH          Department: ER  MRN:        [id number]                      Room:        12  Gender:     Male                         Technician: [id number]  :        1985                   Requested By:AMY ABBASI  Order #:    [id number]                    Reading MD:    Measurements  Intervals                                Axis  Rate:       93                           P:          64  IN:         127                          QRS:        62  QRSD:       90                           T:          -62  QT:         347  QTc:        432    Interpretive Statements  Sinus rhythm  Nonspecific T abnormalities, diffuse leads  Borderline ST elevation, anterior leads  Compared to ECG 2023 04:18:00  T-wave abnormality now present  Left ventricular hypertrophy no longer present  ST (T wave) deviation still present     POCT glucose device results   Result Value Ref Range    POC Glucose, Blood 219 (H) 65 - 99 mg/dL   POCT glucose device results   Result Value Ref Range    POC Glucose, Blood 158 (H) 65 - 99 mg/dL       I have independently interpreted this EKG    EKG; normal sinus rhythm at a rate of 93.  No ST elevation no ST depression.  QTc is 432.  Compared other previous EKG from 2023    RADIOLOGY  The attending emergency physician has independently interpreted the diagnostic imaging associated with this visit and am waiting the final reading from the radiologist.   My preliminary interpretation is a follows: See below    Radiologist interpretation:  DX-CHEST-PORTABLE (1 VIEW)   Final Result      No acute cardiac or pulmonary abnormalities are identified.             COURSE & MEDICAL DECISION MAKING     ASSESSMENT, COURSE AND PLAN  Care Narrative: This is a 38-year-old  male here for evaluation of elevated sugars and chest pain.  Patient does have some EKG changes noted on his EKG.  His troponin is normal, but he will need a admission and stress test.\    DISPOSITION AND DISCUSSIONS  I have discussed management of the patient with the following physicians and LUCRETIA's: Hospitalist, CDU    FINAL DIANGOSIS  1. Chest pain, unspecified type    2. Hyperglycemia    3. Hemorrhoids, unspecified hemorrhoid type

## 2024-04-17 NOTE — ED NOTES
"Break RN:  Pt used call light to report feeling lightheaded and \"stressed.\"  BP elevated.  Primary MD Barksdale alerted and new orders received.  "

## 2024-04-17 NOTE — ED NOTES
Labs drawn in triage.  Ambulated to room with steady gait. Changing into a hospital gown. Chart up for erp to see.

## 2024-04-17 NOTE — ED NOTES
Med Rec complete per patient   Allergies reviewed  Antibiotics in the past 30 days:no  Anticoagulant in past 14 days:no  Pharmacy patient utilizes:[facility] on [address]

## 2024-04-17 NOTE — ED NOTES
Dr. Barksdale notified that pt's blood pressure is now down to 160/91 after labetalol and can go to CDU

## 2024-04-18 PROBLEM — E78.2 MIXED HYPERLIPIDEMIA: Status: ACTIVE | Noted: 2024-04-18

## 2024-04-18 LAB
ALBUMIN SERPL BCP-MCNC: 4.5 G/DL (ref 3.2–4.9)
ALBUMIN/GLOB SERPL: 1.7 G/DL
ALP SERPL-CCNC: 120 U/L (ref 30–99)
ALT SERPL-CCNC: 111 U/L (ref 2–50)
ANION GAP SERPL CALC-SCNC: 13 MMOL/L (ref 7–16)
AST SERPL-CCNC: 38 U/L (ref 12–45)
BILIRUB SERPL-MCNC: 0.4 MG/DL (ref 0.1–1.5)
BUN SERPL-MCNC: 9 MG/DL (ref 8–22)
CALCIUM ALBUM COR SERPL-MCNC: 8.7 MG/DL (ref 8.5–10.5)
CALCIUM SERPL-MCNC: 9.1 MG/DL (ref 8.5–10.5)
CHLORIDE SERPL-SCNC: 102 MMOL/L (ref 96–112)
CHOLEST SERPL-MCNC: 269 MG/DL (ref 100–199)
CO2 SERPL-SCNC: 21 MMOL/L (ref 20–33)
CREAT SERPL-MCNC: 0.67 MG/DL (ref 0.5–1.4)
ERYTHROCYTE [DISTWIDTH] IN BLOOD BY AUTOMATED COUNT: 42.4 FL (ref 35.9–50)
GFR SERPLBLD CREATININE-BSD FMLA CKD-EPI: 122 ML/MIN/1.73 M 2
GLOBULIN SER CALC-MCNC: 2.7 G/DL (ref 1.9–3.5)
GLUCOSE BLD STRIP.AUTO-MCNC: 126 MG/DL (ref 65–99)
GLUCOSE BLD STRIP.AUTO-MCNC: 133 MG/DL (ref 65–99)
GLUCOSE BLD STRIP.AUTO-MCNC: 187 MG/DL (ref 65–99)
GLUCOSE BLD STRIP.AUTO-MCNC: 201 MG/DL (ref 65–99)
GLUCOSE SERPL-MCNC: 198 MG/DL (ref 65–99)
HCT VFR BLD AUTO: 49 % (ref 42–52)
HDLC SERPL-MCNC: 55 MG/DL
HGB BLD-MCNC: 17.1 G/DL (ref 14–18)
LDLC SERPL CALC-MCNC: 147 MG/DL
LIPASE SERPL-CCNC: 188 U/L (ref 11–82)
LV EJECT FRACT  99904: 75
LV EJECT FRACT MOD 2C 99903: 65.31
LV EJECT FRACT MOD 4C 99902: 55.13
LV EJECT FRACT MOD BP 99901: 61.26
MAGNESIUM SERPL-MCNC: 2 MG/DL (ref 1.5–2.5)
MCH RBC QN AUTO: 31.1 PG (ref 27–33)
MCHC RBC AUTO-ENTMCNC: 34.9 G/DL (ref 32.3–36.5)
MCV RBC AUTO: 89.1 FL (ref 81.4–97.8)
PHOSPHATE SERPL-MCNC: 3.1 MG/DL (ref 2.5–4.5)
PLATELET # BLD AUTO: 257 K/UL (ref 164–446)
PMV BLD AUTO: 11.8 FL (ref 9–12.9)
POTASSIUM SERPL-SCNC: 3.8 MMOL/L (ref 3.6–5.5)
PROT SERPL-MCNC: 7.2 G/DL (ref 6–8.2)
RBC # BLD AUTO: 5.5 M/UL (ref 4.7–6.1)
SODIUM SERPL-SCNC: 136 MMOL/L (ref 135–145)
TRIGL SERPL-MCNC: 335 MG/DL (ref 0–149)
TROPONIN T SERPL-MCNC: 7 NG/L (ref 6–19)
WBC # BLD AUTO: 12.6 K/UL (ref 4.8–10.8)

## 2024-04-18 PROCEDURE — 85027 COMPLETE CBC AUTOMATED: CPT

## 2024-04-18 PROCEDURE — 700102 HCHG RX REV CODE 250 W/ 637 OVERRIDE(OP): Performed by: HOSPITALIST

## 2024-04-18 PROCEDURE — 80053 COMPREHEN METABOLIC PANEL: CPT

## 2024-04-18 PROCEDURE — 84484 ASSAY OF TROPONIN QUANT: CPT

## 2024-04-18 PROCEDURE — 83690 ASSAY OF LIPASE: CPT

## 2024-04-18 PROCEDURE — 83735 ASSAY OF MAGNESIUM: CPT

## 2024-04-18 PROCEDURE — A9270 NON-COVERED ITEM OR SERVICE: HCPCS | Performed by: HOSPITALIST

## 2024-04-18 PROCEDURE — 700111 HCHG RX REV CODE 636 W/ 250 OVERRIDE (IP): Mod: JZ | Performed by: HOSPITALIST

## 2024-04-18 PROCEDURE — 84100 ASSAY OF PHOSPHORUS: CPT

## 2024-04-18 PROCEDURE — 96372 THER/PROPH/DIAG INJ SC/IM: CPT

## 2024-04-18 PROCEDURE — G0378 HOSPITAL OBSERVATION PER HR: HCPCS

## 2024-04-18 PROCEDURE — 82962 GLUCOSE BLOOD TEST: CPT | Mod: 91

## 2024-04-18 PROCEDURE — 80061 LIPID PANEL: CPT

## 2024-04-18 PROCEDURE — 93306 TTE W/DOPPLER COMPLETE: CPT | Mod: 26 | Performed by: INTERNAL MEDICINE

## 2024-04-18 PROCEDURE — 99233 SBSQ HOSP IP/OBS HIGH 50: CPT | Performed by: HOSPITALIST

## 2024-04-18 RX ORDER — LOSARTAN POTASSIUM 50 MG/1
25 TABLET ORAL ONCE
Status: COMPLETED | OUTPATIENT
Start: 2024-04-18 | End: 2024-04-18

## 2024-04-18 RX ORDER — ATORVASTATIN CALCIUM 40 MG/1
40 TABLET, FILM COATED ORAL EVERY EVENING
Status: DISCONTINUED | OUTPATIENT
Start: 2024-04-18 | End: 2024-04-19 | Stop reason: HOSPADM

## 2024-04-18 RX ADMIN — METOPROLOL TARTRATE 25 MG: 25 TABLET, FILM COATED ORAL at 08:42

## 2024-04-18 RX ADMIN — METOPROLOL TARTRATE 50 MG: 25 TABLET, FILM COATED ORAL at 05:15

## 2024-04-18 RX ADMIN — METOPROLOL TARTRATE 75 MG: 50 TABLET, FILM COATED ORAL at 17:23

## 2024-04-18 RX ADMIN — METFORMIN HYDROCHLORIDE 500 MG: 500 TABLET ORAL at 17:24

## 2024-04-18 RX ADMIN — ATORVASTATIN CALCIUM 40 MG: 40 TABLET, FILM COATED ORAL at 17:24

## 2024-04-18 RX ADMIN — OMEPRAZOLE 20 MG: 20 CAPSULE, DELAYED RELEASE ORAL at 17:24

## 2024-04-18 RX ADMIN — METFORMIN HYDROCHLORIDE 500 MG: 500 TABLET ORAL at 08:42

## 2024-04-18 RX ADMIN — Medication 100 MG: at 05:16

## 2024-04-18 RX ADMIN — OMEPRAZOLE 20 MG: 20 CAPSULE, DELAYED RELEASE ORAL at 05:16

## 2024-04-18 RX ADMIN — INSULIN HUMAN 2 UNITS: 100 INJECTION, SOLUTION PARENTERAL at 06:02

## 2024-04-18 RX ADMIN — AMITRIPTYLINE HYDROCHLORIDE 50 MG: 50 TABLET, FILM COATED ORAL at 20:55

## 2024-04-18 RX ADMIN — LOSARTAN POTASSIUM 25 MG: 50 TABLET, FILM COATED ORAL at 08:42

## 2024-04-18 RX ADMIN — INSULIN HUMAN 3 UNITS: 100 INJECTION, SOLUTION PARENTERAL at 21:01

## 2024-04-18 RX ADMIN — LOSARTAN POTASSIUM 50 MG: 50 TABLET, FILM COATED ORAL at 05:15

## 2024-04-18 ASSESSMENT — PAIN DESCRIPTION - PAIN TYPE
TYPE: ACUTE PAIN

## 2024-04-18 ASSESSMENT — LIFESTYLE VARIABLES
HEADACHE, FULLNESS IN HEAD: NOT PRESENT
ORIENTATION AND CLOUDING OF SENSORIUM: ORIENTED AND CAN DO SERIAL ADDITIONS
HEADACHE, FULLNESS IN HEAD: NOT PRESENT
TREMOR: NO TREMOR
HEADACHE, FULLNESS IN HEAD: NOT PRESENT
AGITATION: NORMAL ACTIVITY
ANXIETY: MILDLY ANXIOUS
TREMOR: NO TREMOR
NAUSEA AND VOMITING: NO NAUSEA AND NO VOMITING
NAUSEA AND VOMITING: NO NAUSEA AND NO VOMITING
TOTAL SCORE: 1
ORIENTATION AND CLOUDING OF SENSORIUM: ORIENTED AND CAN DO SERIAL ADDITIONS
ORIENTATION AND CLOUDING OF SENSORIUM: ORIENTED AND CAN DO SERIAL ADDITIONS
TREMOR: NO TREMOR
NAUSEA AND VOMITING: NO NAUSEA AND NO VOMITING
TOTAL SCORE: 1
AUDITORY DISTURBANCES: NOT PRESENT
TREMOR: NO TREMOR
ANXIETY: MILDLY ANXIOUS
VISUAL DISTURBANCES: NOT PRESENT
VISUAL DISTURBANCES: NOT PRESENT
TOTAL SCORE: 1
TOTAL SCORE: 0
ORIENTATION AND CLOUDING OF SENSORIUM: ORIENTED AND CAN DO SERIAL ADDITIONS
ANXIETY: NO ANXIETY (AT EASE)
PAROXYSMAL SWEATS: NO SWEAT VISIBLE
ANXIETY: NO ANXIETY (AT EASE)
VISUAL DISTURBANCES: NOT PRESENT
AGITATION: NORMAL ACTIVITY
AUDITORY DISTURBANCES: NOT PRESENT
AGITATION: NORMAL ACTIVITY
NAUSEA AND VOMITING: NO NAUSEA AND NO VOMITING
TREMOR: NO TREMOR
HEADACHE, FULLNESS IN HEAD: NOT PRESENT
PAROXYSMAL SWEATS: NO SWEAT VISIBLE
AUDITORY DISTURBANCES: NOT PRESENT
ORIENTATION AND CLOUDING OF SENSORIUM: ORIENTED AND CAN DO SERIAL ADDITIONS
NAUSEA AND VOMITING: NO NAUSEA AND NO VOMITING
PAROXYSMAL SWEATS: BARELY PERCEPTIBLE SWEATING. PALMS MOIST
VISUAL DISTURBANCES: NOT PRESENT
AGITATION: NORMAL ACTIVITY
ANXIETY: NO ANXIETY (AT EASE)
VISUAL DISTURBANCES: NOT PRESENT
AUDITORY DISTURBANCES: NOT PRESENT
HEADACHE, FULLNESS IN HEAD: NOT PRESENT
AUDITORY DISTURBANCES: NOT PRESENT
AGITATION: NORMAL ACTIVITY
TOTAL SCORE: 0

## 2024-04-18 ASSESSMENT — COGNITIVE AND FUNCTIONAL STATUS - GENERAL
MOBILITY SCORE: 24
SUGGESTED CMS G CODE MODIFIER DAILY ACTIVITY: CH
SUGGESTED CMS G CODE MODIFIER MOBILITY: CH
DAILY ACTIVITIY SCORE: 24

## 2024-04-18 ASSESSMENT — ENCOUNTER SYMPTOMS
CARDIOVASCULAR NEGATIVE: 1
EYES NEGATIVE: 1
RESPIRATORY NEGATIVE: 1
MUSCULOSKELETAL NEGATIVE: 1
PSYCHIATRIC NEGATIVE: 1
NEUROLOGICAL NEGATIVE: 1

## 2024-04-18 NOTE — ASSESSMENT & PLAN NOTE
As per patient    This likely represents bleeding from external hemorrhoids    Due to his history of alcohol abuse esophagitis gastritis and PUD also possible but markedly less likely patient started on oral PPI

## 2024-04-18 NOTE — ASSESSMENT & PLAN NOTE
Patient denies abdominal pain  Incidental finding likely related to his alcohol abuse    If Patient develops abdominal pain --> would recommend downgrade of diet

## 2024-04-18 NOTE — HOSPITAL COURSE
Leo Medellin is a 38 y.o. male who presented 4/17/2024 with past medical history of alcohol abuse, hypertension, diabetes mellitus type 2 who comes into the emergency department at [facility] with multiple complaints.  He says that he is noted over the last several days he been dealing with headaches and intermittent chest pain and dizziness.  In the emergency department his blood pressure was markedly elevated-the highest documented was was  218/108 .  he was given IV hydralazine with minimal effect.  He was also tachycardic.  The ER physician was concerned of EKG changes and thought that he might be having acute coronary syndrome however his symptoms most likely related to his markedly elevated blood pressure as he is also having headache and dizziness.  Patient states that he has not taken his diabetic medication or his blood pressure medications because apparently his sugars and his BP was controlled.  Patient has relapsed on his alcohol usage and his last alcohol usage was yesterday.  Patient to be admitted for hypertensive urgency.  I gave the patient IV labetalol with good effect on his blood pressure brought down to 160 systolic     Patient says that he also been dealing with bloody stools over the last few days     Patient's hemoglobin is at his baseline at 16.

## 2024-04-18 NOTE — H&P
"Hospital Medicine History & Physical Note    Date of Service  4/17/2024    Primary Care Physician  CRISTIAN Hamilton    Consultants      Code Status  Full Code    Chief Complaint  Chief Complaint   Patient presents with    High Blood Sugar     Patient reports feeling fatigued with episodes of N/V. Patient also reports HA. FSBG 219 in triage. Patient reports he was taking metformin, but stopped taking it \"because my blood sugars have been ok\".    Bloody Stools     Patient reports the last few days he has noticed some blood when he has a bowel movement       History of Presenting Illness  Leo Medellin is a 38 y.o. male who presented 4/17/2024 with past medical history of alcohol abuse, hypertension, diabetes mellitus type 2 who comes into the emergency department at [facility] with multiple complaints.  He says that he is noted over the last several days he been dealing with headaches and intermittent chest pain and dizziness.  In the emergency department his blood pressure was markedly elevated-the highest documented was was  218/108 .  he was given IV hydralazine with minimal effect.  He was also tachycardic.  The ER physician was concerned of EKG changes and thought that he might be having acute coronary syndrome however his symptoms most likely related to his markedly elevated blood pressure as he is also having headache and dizziness.  Patient states that he has not taken his diabetic medication or his blood pressure medications because apparently his sugars and his BP was controlled.  Patient has relapsed on his alcohol usage and his last alcohol usage was yesterday.  Patient to be admitted for hypertensive urgency.  I gave the patient IV labetalol with good effect on his blood pressure brought down to 160 systolic    Patient says that he also been dealing with bloody stools over the last few days    Patient's hemoglobin is at his baseline at 16.    I discussed the plan of " care with patient.    Review of Systems  Review of Systems   Constitutional:  Positive for malaise/fatigue.   Respiratory: Negative.     Cardiovascular:  Positive for chest pain.   Gastrointestinal:  Positive for nausea.   Genitourinary: Negative.    Musculoskeletal: Negative.    Neurological:  Positive for dizziness and headaches.   Psychiatric/Behavioral: Negative.         Past Medical History   has a past medical history of Alcohol abuse, Alcohol abuse, Alcoholic cirrhosis (HCC), Anxiety, Asthma, Depression, ETOH abuse, Gastritis, Hypertension, Liver failure (HCC), Pancreatitis, and Ulcer.    Surgical History   has a past surgical history that includes pr appendectomy and appendectomy.     Family History  family history includes Diabetes in his brother; Hypertension in his mother.   Family history reviewed with patient. There is family history that is pertinent to the chief complaint.     Social History   reports that he has quit smoking. His smoking use included cigarettes. He has never used smokeless tobacco. He reports that he does not currently use alcohol. He reports current drug use. Drug: Inhaled.    Allergies  No Known Allergies    Medications  Prior to Admission Medications   Prescriptions Last Dose Informant Patient Reported? Taking?   amitriptyline (ELAVIL) 50 MG Tab 4/16/2024 at PM Patient Yes Yes   Sig: Take 50 mg by mouth every evening.      Facility-Administered Medications: None       Physical Exam  Temp:  [36.6 °C (97.8 °F)-37 °C (98.6 °F)] 37 °C (98.6 °F)  Pulse:  [] 102  Resp:  [12-20] 14  BP: (149-218)/() 182/111  SpO2:  [88 %-99 %] 97 %  Blood Pressure: (!) 182/111 (Notified RN)   Temperature: 37 °C (98.6 °F)   Pulse: (!) 102   Respiration: 14   Pulse Oximetry: 97 %       Physical Exam  Constitutional:       General: He is in acute distress.      Appearance: He is diaphoretic. He is not ill-appearing.   HENT:      Mouth/Throat:      Mouth: Mucous membranes are moist.   Eyes:       "Extraocular Movements: Extraocular movements intact.   Cardiovascular:      Rate and Rhythm: Regular rhythm. Tachycardia present.      Heart sounds: No murmur heard.     No friction rub. No gallop.   Pulmonary:      Effort: No respiratory distress.      Breath sounds: No stridor. No wheezing or rhonchi.   Abdominal:      General: There is distension.      Palpations: There is no mass.      Tenderness: There is no abdominal tenderness. There is no guarding.      Hernia: No hernia is present.   Musculoskeletal:         General: No swelling.      Cervical back: Normal range of motion.   Skin:     Capillary Refill: Capillary refill takes 2 to 3 seconds.      Coloration: Skin is not jaundiced.      Findings: No bruising or lesion.   Neurological:      General: No focal deficit present.      Mental Status: He is oriented to person, place, and time.      Cranial Nerves: No cranial nerve deficit.      Motor: No weakness.      Gait: Gait normal.   Psychiatric:         Behavior: Behavior normal.         Laboratory:  Recent Labs     04/17/24  0749   WBC 10.4   RBC 5.29   HEMOGLOBIN 16.2   HEMATOCRIT 46.4   MCV 87.7   MCH 30.6   MCHC 34.9   RDW 41.8   PLATELETCT 242   MPV 12.0     Recent Labs     04/17/24  0749   SODIUM 137   POTASSIUM 4.1   CHLORIDE 103   CO2 23   GLUCOSE 221*   BUN 11   CREATININE 0.79   CALCIUM 9.2     Recent Labs     04/17/24  0749   ALTSGPT 136*   ASTSGOT 61*   ALKPHOSPHAT 132*   TBILIRUBIN 0.3   LIPASE 159*   GLUCOSE 221*     Recent Labs     04/17/24  0749   APTT 25.4   INR 0.92     No results for input(s): \"NTPROBNP\" in the last 72 hours.      Recent Labs     04/17/24  0749   TROPONINT <6       Imaging:  DX-CHEST-PORTABLE (1 VIEW)   Final Result      No acute cardiac or pulmonary abnormalities are identified.      EC-ECHOCARDIOGRAM COMPLETE W/O CONT    (Results Pending)       X-Ray:  I have personally reviewed the images and compared with prior images.    EKG Reviewed by me shows LVH related  T wave " abnormalities, heart rate of 93    Assessment/Plan:  Justification for Admission Status  I anticipate this patient is appropriate for observation status at this time because I suspect patient will require less than 48 hours for for evaluation and treatment of his elevated blood pressure        * Chest pain- (present on admission)  Assessment & Plan  The ASCVD Risk score (Marcia HERNANDEZ, et al., 2019) failed to calculate for the following reasons:    The 2019 ASCVD risk score is only valid for ages 40 to 79    Patient's symptoms are not typical for ACS  I truly believe that his pain is related to his markedly elevated blood pressure    Control BP      Hypertensive urgency- (present on admission)  Assessment & Plan  Slowly reduce patient's BP with initiation of Cozaar 50 and Lopressor 50 twice daily IV labetalol as needed for SBP greater than 170 systolic  Check an echocardiogram    Elevated lipase- (present on admission)  Assessment & Plan  Patient denies abdominal pain  Incidental finding likely related to his alcohol abuse    If lipase rises or patient complains of abdominal pain distention consistent with acute proctitis alcohol-related    Alcohol abuse- (present on admission)  Assessment & Plan  Alcohol cessation advised    Thiamine daily    We initiated CIWA protocol with Ativan if necessary    Check a magnesium level and phosphorus level in the a.m.    Bloody stool- (present on admission)  Assessment & Plan  As per patient    This likely represents bleeding from external hemorrhoids    Due to his history of alcohol abuse esophagitis gastritis and PUD also possible but markedly less likely patient started on oral PPI    Alcoholic hepatitis without ascites- (present on admission)  Assessment & Plan  This is the cause of patient's increased AST and ALT.    Alcohol cessation was advised    Follow-up on CMP    Type 2 diabetes mellitus without complication, without long-term current use of insulin (HCC)- (present on  admission)  Assessment & Plan  Check a hemoglobin A1c    Diabetic diet    Fingerstick  Scale insulin        VTE prophylaxis: SCDs/TEDs

## 2024-04-18 NOTE — CARE PLAN
The patient is Watcher - Medium risk of patient condition declining or worsening    Shift Goals  Clinical Goals: CIWA, BP control  Patient Goals: feel better  Family Goals: not present    Progress made toward(s) clinical / shift goals:    Problem: Pain - Standard  Goal: Alleviation of pain or a reduction in pain to the patient’s comfort goal  Outcome: Progressing  Pt did not complain of pain since arrival to unit.      Problem: Psychosocial  Goal: Patient's level of anxiety will decrease  Outcome: Progressing   Pt did not express signs of anxiety. No medication given for anxiety.     Patient is not progressing towards the following goals:NA

## 2024-04-18 NOTE — CARE PLAN
The patient is Stable - Low risk of patient condition declining or worsening    Shift Goals  Clinical Goals: Pend AVANI mcdonald,  Patient Goals: Rest  Family Goals: Not at bedside    Progress made toward(s) clinical / shift goals:    Problem: Knowledge Deficit - Standard  Goal: Patient and family/care givers will demonstrate understanding of plan of care, disease process/condition, diagnostic tests and medications  Description: Target End Date:  1-3 days or as soon as patient condition allows    Document in Patient Education    1.  Patient and family/caregiver oriented to unit, equipment, visitation policy and means for communicating concern  2.  Complete/review Learning Assessment  3.  Assess knowledge level of disease process/condition, treatment plan, diagnostic tests and medications  4.  Explain disease process/condition, treatment plan, diagnostic tests and medications  Outcome: Progressing     Problem: Pain - Standard  Goal: Alleviation of pain or a reduction in pain to the patient’s comfort goal  Description: Target End Date:  Prior to discharge or change in level of care    Document on Vitals flowsheet    1.  Document pain using the appropriate pain scale per order or unit policy  2.  Educate and implement non-pharmacologic comfort measures (i.e. relaxation, distraction, massage, cold/heat therapy, etc.)  3.  Pain management medications as ordered  4.  Reassess pain after pain med administration per policy  5.  If opiods administered assess patient's response to pain medication is appropriate per POSS sedation scale  6.  Follow pain management plan developed in collaboration with patient and interdisciplinary team (including palliative care or pain specialists if applicable)  Outcome: Progressing       Patient is not progressing towards the following goals:

## 2024-04-18 NOTE — ASSESSMENT & PLAN NOTE
The ASCVD Risk score (Marcia HERNANDEZ, et al., 2019) failed to calculate for the following reasons:    The 2019 ASCVD risk score is only valid for ages 40 to 79    Patient's symptoms are not typical for ACS  I truly believe that his pain is related to his markedly elevated blood pressure    Control BP

## 2024-04-18 NOTE — DISCHARGE PLANNING
Discussed in AM rounds and patient asking for information on Behavioral health. Spoke with patient at bedside and behavioral health information given. Anticipate no other needs @ present time.

## 2024-04-18 NOTE — PROGRESS NOTES
Hospital Medicine Daily Progress Note    Date of Service  4/18/2024    Chief Complaint  Leo Medellin is a 38 y.o. male admitted 4/17/2024 with headache and dizziness    Hospital Course  Leo Medellin is a 38 y.o. male who presented 4/17/2024 with past medical history of alcohol abuse, hypertension, diabetes mellitus type 2 who comes into the emergency department at [facility] with multiple complaints.  He says that he is noted over the last several days he been dealing with headaches and intermittent chest pain and dizziness.  In the emergency department his blood pressure was markedly elevated-the highest documented was was  218/108 .  he was given IV hydralazine with minimal effect.  He was also tachycardic.  The ER physician was concerned of EKG changes and thought that he might be having acute coronary syndrome however his symptoms most likely related to his markedly elevated blood pressure as he is also having headache and dizziness.  Patient states that he has not taken his diabetic medication or his blood pressure medications because apparently his sugars and his BP was controlled.  Patient has relapsed on his alcohol usage and his last alcohol usage was yesterday.  Patient to be admitted for hypertensive urgency.  I gave the patient IV labetalol with good effect on his blood pressure brought down to 160 systolic     Patient says that he also been dealing with bloody stools over the last few days     Patient's hemoglobin is at his baseline at 16.       Interval Problem Update  Patient's headache and dizziness has resolved with improvement in his blood pressure.    His BP is still elevated and warrants additional control    We have increased patient's Cozaar and Lopressor today    Patient lipid profile is elevated and he used to take Lipitor but he stopped it because he was not taking care of himself so Lipitor has been resumed today    Patient was on metformin in the past  and  he states that it was controlling his sugars in the past we have resumed metformin today    Patient is little depressed today as he got fired from his job     I have discussed this patient's plan of care and discharge plan at IDT rounds today with Case Management, Nursing, Nursing leadership, and other members of the IDT team.    Consultants/Specialty      Code Status  Full Code    Disposition  The patient is not medically cleared for discharge to home or a post-acute facility.  Anticipate discharge to: home with close outpatient follow-up    I have placed the appropriate orders for post-discharge needs.    Review of Systems  Review of Systems   Constitutional:  Positive for malaise/fatigue.   HENT: Negative.     Eyes: Negative.    Respiratory: Negative.     Cardiovascular: Negative.    Genitourinary: Negative.    Musculoskeletal: Negative.    Neurological: Negative.    Psychiatric/Behavioral: Negative.          Physical Exam  Temp:  [36.1 °C (96.9 °F)-37 °C (98.6 °F)] 36.7 °C (98 °F)  Pulse:  [] 92  Resp:  [12-20] 16  BP: (149-218)/() 169/116  SpO2:  [88 %-99 %] 98 %    Physical Exam  Constitutional:       General: He is not in acute distress.     Appearance: He is obese. He is not toxic-appearing.   Eyes:      Extraocular Movements: Extraocular movements intact.      Pupils: Pupils are equal, round, and reactive to light.   Cardiovascular:      Rate and Rhythm: Normal rate and regular rhythm.      Heart sounds: No murmur heard.     No gallop.   Pulmonary:      Effort: No respiratory distress.      Breath sounds: No stridor. No wheezing or rhonchi.   Abdominal:      General: There is distension.      Palpations: There is no mass.      Tenderness: There is no abdominal tenderness. There is no guarding.      Hernia: No hernia is present.   Musculoskeletal:         General: No swelling, tenderness, deformity or signs of injury. Normal range of motion.      Cervical back: Normal range of motion.      Right  lower leg: No edema.      Left lower leg: No edema.   Skin:     Capillary Refill: Capillary refill takes 2 to 3 seconds.      Coloration: Skin is not jaundiced or pale.      Findings: No bruising or erythema.   Neurological:      General: No focal deficit present.      Mental Status: He is oriented to person, place, and time.      Cranial Nerves: No cranial nerve deficit.      Motor: No weakness.   Psychiatric:         Mood and Affect: Mood normal.         Behavior: Behavior normal.         Fluids  No intake or output data in the 24 hours ending 04/18/24 0911    Laboratory  Recent Labs     04/17/24  0749 04/18/24  0100   WBC 10.4 12.6*   RBC 5.29 5.50   HEMOGLOBIN 16.2 17.1   HEMATOCRIT 46.4 49.0   MCV 87.7 89.1   MCH 30.6 31.1   MCHC 34.9 34.9   RDW 41.8 42.4   PLATELETCT 242 257   MPV 12.0 11.8     Recent Labs     04/17/24  0749 04/18/24  0100   SODIUM 137 136   POTASSIUM 4.1 3.8   CHLORIDE 103 102   CO2 23 21   GLUCOSE 221* 198*   BUN 11 9   CREATININE 0.79 0.67   CALCIUM 9.2 9.1     Recent Labs     04/17/24  0749   APTT 25.4   INR 0.92         Recent Labs     04/18/24  0100   TRIGLYCERIDE 335*   HDL 55   *       Imaging  EC-ECHOCARDIOGRAM COMPLETE W/ CONT   Final Result      DX-CHEST-PORTABLE (1 VIEW)   Final Result      No acute cardiac or pulmonary abnormalities are identified.           Assessment/Plan  * Chest pain- (present on admission)  Assessment & Plan  The ASCVD Risk score ([place] DK, et al., 2019) failed to calculate for the following reasons:    The 2019 ASCVD risk score is only valid for ages 40 to 79    Patient's symptoms are not typical for ACS  I truly believe that his pain is related to his markedly elevated blood pressure    Control BP      Hypertensive urgency- (present on admission)  Assessment & Plan  Continue to slowly reduce patient's BP    Cozaar Increase to 75 mg daily and Lopressor to 75 mg p.o. twice daily continue to titrate accordingly    Echocardiogram within normal  limits    Mixed hyperlipidemia- (present on admission)  Assessment & Plan  Lipitor 40 mg nightly    Elevated lipase- (present on admission)  Assessment & Plan  Patient denies abdominal pain  Incidental finding likely related to his alcohol abuse    If Patient develops abdominal pain --> would recommend downgrade of diet    Alcohol abuse- (present on admission)  Assessment & Plan  Alcohol cessation advised    Thiamine daily     CIWA protocol with Ativan if necessary      Bloody stool- (present on admission)  Assessment & Plan  As per patient    This likely represents bleeding from external hemorrhoids    Due to his history of alcohol abuse esophagitis gastritis and PUD also possible but markedly less likely patient started on oral PPI    Alcoholic hepatitis without ascites- (present on admission)  Assessment & Plan  This is the cause of patient's increased AST and ALT...  Daily    Alcohol cessation was advised    Follow-up on CMP    Type 2 diabetes mellitus without complication, without long-term current use of insulin (HCC)- (present on admission)  Assessment & Plan  hemoglobin A1c of 8    Diabetic diet    Fingerstick  Scale insulin    P.o. metformin         VTE prophylaxis:   SCDs/TEDs      I have performed a physical exam and reviewed and updated ROS and Plan today (4/18/2024). In review of yesterday's note (4/17/2024), there are no changes except as documented above.    Greater than 52 minutes spent prepping to see patient (e.g. review of tests) obtaining and/or reviewing separately obtained history. Performing a medically appropriate examination and/ evaluation.  Counseling and educating the patient/family/caregiver.  Ordering medications, tests, or procedures.  Referring and communicating with other health care professionals.  Documenting clinical information in EPIC.  Independently interpreting results and communicating results to patient/family/caregiver.  Care coordination.

## 2024-04-18 NOTE — PROGRESS NOTES
Assumed care of pt. Pt A&Ox4, NAD, VSS on RA. C/o mild headache. Call light in reach. Bed in lowest position. Care of plan discussed with pt with pt agreeing to care of plan. Communication board updated. All questions answered. Assessment completed.

## 2024-04-18 NOTE — PROGRESS NOTES
4 Eyes Skin Assessment Completed by SABINE Ortega and SABINE Unger.    Head Scar  Ears WDL  Nose WDL  Mouth WDL  Neck WDL  Breast/Chest WDL  Shoulder Blades WDL  Spine WDL scar on back  (R) Arm/Elbow/Hand Scar  (L) Arm/Elbow/Hand WDL  Abdomen WDL  Groin WDL  Scrotum/Coccyx/Buttocks WDL  (R) Leg WDL  (L) Leg WDL  (R) Heel/Foot/Toe Discoloration large toe; Rash top of foot  (L) Heel/Foot/Toe  Rash top of foot          Devices In Places Pulse Ox      Interventions In Place N/A    Possible Skin Injury No    Pictures Uploaded Into Epic N/A  Wound Consult Placed N/A  RN Wound Prevention Protocol Ordered No

## 2024-04-18 NOTE — ASSESSMENT & PLAN NOTE
This is the cause of patient's increased AST and ALT...  Daily    Alcohol cessation was advised    Follow-up on CMP

## 2024-04-18 NOTE — CARE PLAN
The patient is Stable - Low risk of patient condition declining or worsening    Shift Goals  Clinical Goals: Pend AVANI mcdonald,  Patient Goals: Rest  Family Goals: Not at bedside    Progress made toward(s) clinical / shift goals:        Problem: Pain - Standard  Goal: Alleviation of pain or a reduction in pain to the patient’s comfort goal  Description: Target End Date:  Prior to discharge or change in level of care    Document on Vitals flowsheet    1.  Document pain using the appropriate pain scale per order or unit policy  2.  Educate and implement non-pharmacologic comfort measures (i.e. relaxation, distraction, massage, cold/heat therapy, etc.)  3.  Pain management medications as ordered  4.  Reassess pain after pain med administration per policy  5.  If opiods administered assess patient's response to pain medication is appropriate per POSS sedation scale  6.  Follow pain management plan developed in collaboration with patient and interdisciplinary team (including palliative care or pain specialists if applicable)  Outcome: Progressing     Problem: Knowledge Deficit - Standard  Goal: Patient and family/care givers will demonstrate understanding of plan of care, disease process/condition, diagnostic tests and medications  Description: Target End Date:  1-3 days or as soon as patient condition allows    Document in Patient Education    1.  Patient and family/caregiver oriented to unit, equipment, visitation policy and means for communicating concern  2.  Complete/review Learning Assessment  3.  Assess knowledge level of disease process/condition, treatment plan, diagnostic tests and medications  4.  Explain disease process/condition, treatment plan, diagnostic tests and medications  Outcome: Progressing     Problem: Lifestyle Changes  Goal: Patient's ability to identify lifestyle changes and available resources to help reduce recurrence of condition will improve  Description: Target End Date:  1 to 3 days    1.   Discuss recommended lifestyle changes  2.  Identify available resources and support systems  3.  Consider referral to substance abuse program  Outcome: Progressing       Patient is not progressing towards the following goals:

## 2024-04-18 NOTE — PROGRESS NOTES
4 Eyes Skin Assessment Completed by SABINE Justin and Flakito RN.    Head Scars  Ears WDL  Nose WDL  Mouth WDL  Neck WDL  Breast/Chest WDL  Shoulder Blades WDL  Spine WDL  (R) Arm/Elbow/Hand WDL  (L) Arm/Elbow/Hand Scar  Abdomen WDL  Groin WDL  Scrotum/Coccyx/Buttocks WDL  (R) Leg WDL  (L) Leg WDL  (R) Heel/Foot/Toe WDL  (L) Heel/Foot/Toe WDL      Interventions In Place Pillows    Possible Skin Injury No    Pictures Uploaded Into Epic Yes  Wound Consult Placed N/A  RN Wound Prevention Protocol Ordered No

## 2024-04-19 VITALS
HEIGHT: 67 IN | SYSTOLIC BLOOD PRESSURE: 145 MMHG | BODY MASS INDEX: 32.66 KG/M2 | RESPIRATION RATE: 18 BRPM | WEIGHT: 208.11 LBS | DIASTOLIC BLOOD PRESSURE: 107 MMHG | TEMPERATURE: 97 F | HEART RATE: 78 BPM | OXYGEN SATURATION: 96 %

## 2024-04-19 LAB
ALBUMIN SERPL BCP-MCNC: 4.6 G/DL (ref 3.2–4.9)
ALBUMIN/GLOB SERPL: 1.6 G/DL
ALP SERPL-CCNC: 127 U/L (ref 30–99)
ALT SERPL-CCNC: 96 U/L (ref 2–50)
ANION GAP SERPL CALC-SCNC: 13 MMOL/L (ref 7–16)
AST SERPL-CCNC: 35 U/L (ref 12–45)
BILIRUB SERPL-MCNC: 0.5 MG/DL (ref 0.1–1.5)
BUN SERPL-MCNC: 11 MG/DL (ref 8–22)
CALCIUM ALBUM COR SERPL-MCNC: 9 MG/DL (ref 8.5–10.5)
CALCIUM SERPL-MCNC: 9.5 MG/DL (ref 8.5–10.5)
CHLORIDE SERPL-SCNC: 102 MMOL/L (ref 96–112)
CO2 SERPL-SCNC: 21 MMOL/L (ref 20–33)
CREAT SERPL-MCNC: 0.84 MG/DL (ref 0.5–1.4)
ERYTHROCYTE [DISTWIDTH] IN BLOOD BY AUTOMATED COUNT: 43.2 FL (ref 35.9–50)
GFR SERPLBLD CREATININE-BSD FMLA CKD-EPI: 114 ML/MIN/1.73 M 2
GLOBULIN SER CALC-MCNC: 2.8 G/DL (ref 1.9–3.5)
GLUCOSE BLD STRIP.AUTO-MCNC: 166 MG/DL (ref 65–99)
GLUCOSE SERPL-MCNC: 151 MG/DL (ref 65–99)
HCT VFR BLD AUTO: 51.8 % (ref 42–52)
HGB BLD-MCNC: 17.7 G/DL (ref 14–18)
MCH RBC QN AUTO: 30.5 PG (ref 27–33)
MCHC RBC AUTO-ENTMCNC: 34.2 G/DL (ref 32.3–36.5)
MCV RBC AUTO: 89.2 FL (ref 81.4–97.8)
PLATELET # BLD AUTO: 264 K/UL (ref 164–446)
PMV BLD AUTO: 11.1 FL (ref 9–12.9)
POTASSIUM SERPL-SCNC: 3.7 MMOL/L (ref 3.6–5.5)
PROT SERPL-MCNC: 7.4 G/DL (ref 6–8.2)
RBC # BLD AUTO: 5.81 M/UL (ref 4.7–6.1)
SODIUM SERPL-SCNC: 136 MMOL/L (ref 135–145)
WBC # BLD AUTO: 12.4 K/UL (ref 4.8–10.8)

## 2024-04-19 PROCEDURE — 80053 COMPREHEN METABOLIC PANEL: CPT

## 2024-04-19 PROCEDURE — 82962 GLUCOSE BLOOD TEST: CPT

## 2024-04-19 PROCEDURE — 85027 COMPLETE CBC AUTOMATED: CPT

## 2024-04-19 PROCEDURE — 700102 HCHG RX REV CODE 250 W/ 637 OVERRIDE(OP): Performed by: HOSPITALIST

## 2024-04-19 PROCEDURE — 99239 HOSP IP/OBS DSCHRG MGMT >30: CPT | Performed by: HOSPITALIST

## 2024-04-19 PROCEDURE — 96372 THER/PROPH/DIAG INJ SC/IM: CPT

## 2024-04-19 PROCEDURE — A9270 NON-COVERED ITEM OR SERVICE: HCPCS | Performed by: HOSPITALIST

## 2024-04-19 PROCEDURE — G0378 HOSPITAL OBSERVATION PER HR: HCPCS

## 2024-04-19 RX ORDER — LOSARTAN POTASSIUM 50 MG/1
50 TABLET ORAL DAILY
Qty: 30 TABLET | Refills: 5 | Status: SHIPPED | OUTPATIENT
Start: 2024-04-19

## 2024-04-19 RX ORDER — ATORVASTATIN CALCIUM 40 MG/1
40 TABLET, FILM COATED ORAL EVERY EVENING
Qty: 30 TABLET | Refills: 5 | Status: SHIPPED | OUTPATIENT
Start: 2024-04-19

## 2024-04-19 RX ORDER — AMLODIPINE BESYLATE 5 MG/1
5 TABLET ORAL DAILY
Qty: 30 TABLET | Refills: 5 | Status: SHIPPED | OUTPATIENT
Start: 2024-04-19

## 2024-04-19 RX ADMIN — METFORMIN HYDROCHLORIDE 500 MG: 500 TABLET ORAL at 09:08

## 2024-04-19 RX ADMIN — INSULIN HUMAN 2 UNITS: 100 INJECTION, SOLUTION PARENTERAL at 09:07

## 2024-04-19 RX ADMIN — LOSARTAN POTASSIUM 75 MG: 50 TABLET, FILM COATED ORAL at 05:55

## 2024-04-19 RX ADMIN — Medication 100 MG: at 05:55

## 2024-04-19 RX ADMIN — METOPROLOL TARTRATE 75 MG: 50 TABLET, FILM COATED ORAL at 05:55

## 2024-04-19 RX ADMIN — OMEPRAZOLE 20 MG: 20 CAPSULE, DELAYED RELEASE ORAL at 05:55

## 2024-04-19 ASSESSMENT — PAIN DESCRIPTION - PAIN TYPE: TYPE: ACUTE PAIN

## 2024-04-19 ASSESSMENT — LIFESTYLE VARIABLES
NAUSEA AND VOMITING: NO NAUSEA AND NO VOMITING
TREMOR: NO TREMOR

## 2024-04-19 NOTE — DISCHARGE SUMMARY
"Discharge Summary    CHIEF COMPLAINT ON ADMISSION  Chief Complaint   Patient presents with    High Blood Sugar     Patient reports feeling fatigued with episodes of N/V. Patient also reports HA. FSBG 219 in triage. Patient reports he was taking metformin, but stopped taking it \"because my blood sugars have been ok\".    Bloody Stools     Patient reports the last few days he has noticed some blood when he has a bowel movement       Reason for Admission  High BS     Admission Date  4/17/2024    CODE STATUS  Full Code    HPI & HOSPITAL COURSE  This is a 38 y.o. male here with weakness   Leo Medellin is a 38 y.o. male who presented 4/17/2024 with past medical history of alcohol abuse, hypertension, diabetes mellitus type 2 who comes into the emergency department at [facility] with multiple complaints.  He says that he is noted over the last several days he been dealing with headaches and intermittent chest pain and dizziness.  In the emergency department his blood pressure was markedly elevated-the highest documented was was  218/108 .  he was given IV hydralazine with minimal effect.  He was also tachycardic.  The ER physician was concerned of EKG changes and thought that he might be having acute coronary syndrome however his symptoms most likely related to his markedly elevated blood pressure as he is also having headache and dizziness.  Patient states that he has not taken his diabetic medication or his blood pressure medications because apparently his sugars and his BP was controlled.  Patient has relapsed on his alcohol usage and his last alcohol usage was yesterday.  Patient to be admitted for hypertensive urgency.  I gave the patient IV labetalol with good effect on his blood pressure brought down to 160 systolic     Patient says that he also been dealing with bloody stools over the last few days     Patient's hemoglobin is at his baseline at 16.     He was initiated on multiple blood " pressure medications.  He was monitored for detox.  Upon questioning he does have hemorrhoids.    4/19/2024: Mr. Medellin is feeling quite good today and is anxious to go home.  He is tolerating a regular diet ambulating without assistance on room air.  His blood pressure is controlled.  We had a long discussion about alcohol cessation which she is quite motivated for.    Therefore, he is discharged in good and stable condition to home with close outpatient follow-up.    The patient met 2-midnight criteria for an inpatient stay at the time of discharge.    Discharge Date  4/19    FOLLOW UP ITEMS POST DISCHARGE  He goes to the [facility] clinic which is where I called his scripts in to.   No alcohol discussed at length.  DISCHARGE DIAGNOSES  Principal Problem:    Chest pain (POA: Yes)  Active Problems:    Type 2 diabetes mellitus without complication, without long-term current use of insulin (HCC) (POA: Yes)    Hypertensive urgency (POA: Yes)    Alcoholic hepatitis without ascites (POA: Yes)    Bloody stool (POA: Yes)    Alcohol abuse (POA: Yes)    Elevated lipase (POA: Yes)    Mixed hyperlipidemia (POA: Yes)  Resolved Problems:    * No resolved hospital problems. *      FOLLOW UP  No future appointments.  No follow-up provider specified.    MEDICATIONS ON DISCHARGE     Medication List        START taking these medications        Instructions   amLODIPine 5 MG Tabs  Commonly known as: Norvasc   Take 1 Tablet by mouth every day.  Dose: 5 mg     atorvastatin 40 MG Tabs  Commonly known as: Lipitor   Take 1 Tablet by mouth every evening.  Dose: 40 mg     losartan 50 MG Tabs  Commonly known as: Cozaar   Take 1 Tablet by mouth every day.  Dose: 50 mg     metFORMIN 500 MG Tabs  Commonly known as: Glucophage   Take 1 Tablet by mouth 2 times a day with meals.  Dose: 500 mg            CONTINUE taking these medications        Instructions   amitriptyline 50 MG Tabs  Commonly known as: Elavil   Take 50 mg by mouth every evening.  Dose:  50 mg              Allergies  No Known Allergies    DIET  Orders Placed This Encounter   Procedures    Diet Order Diet: Consistent CHO (Diabetic)     Standing Status:   Standing     Number of Occurrences:   1     Order Specific Question:   Diet:     Answer:   Consistent CHO (Diabetic) [4]       ACTIVITY  As tolerated.      CONSULTATIONS  none    PROCEDURES  none    LABORATORY  Lab Results   Component Value Date    SODIUM 136 04/19/2024    POTASSIUM 3.7 04/19/2024    CHLORIDE 102 04/19/2024    CO2 21 04/19/2024    GLUCOSE 151 (H) 04/19/2024    BUN 11 04/19/2024    CREATININE 0.84 04/19/2024    CREATININE 1.0 04/19/2006      TRi 335  HDL 55  HbA1c 8  Lab Results   Component Value Date    WBC 12.4 (H) 04/19/2024    HEMOGLOBIN 17.7 04/19/2024    HEMATOCRIT 51.8 04/19/2024    PLATELETCT 264 04/19/2024      EC-ECHOCARDIOGRAM COMPLETE W/ CONT   Final Result      DX-CHEST-PORTABLE (1 VIEW)   Final Result      No acute cardiac or pulmonary abnormalities are identified.      Echo:  CONCLUSIONS  No prior study is available for comparison.   The left ventricular ejection fraction is visually estimated to be 75%.  Normal right ventricular size and systolic function.  No significant valvular disease.       Total time of the discharge process exceeds 34 minutes.

## 2024-04-19 NOTE — CARE PLAN
Problem: Optimal Care for Alcohol Withdrawal  Goal: Optimal Care for the alcohol withdrawal patient  Outcome: Progressing     Problem: Psychosocial  Goal: Patient's level of anxiety will decrease  Outcome: Progressing     The patient is Stable - Low risk of patient condition declining or worsening    Shift Goals  Clinical Goals: continue to assess CIWA  Patient Goals: feel better  Family Goals: not present    Progress made toward(s) clinical / shift goals:  patient denies having withdrawal symptoms.     Patient is not progressing towards the following goals:

## 2024-04-19 NOTE — PROGRESS NOTES
D/c instructions given, educated on worsening s/s. Pt understands and questions answered. D/c home with girlfriend

## 2024-07-03 ENCOUNTER — APPOINTMENT (OUTPATIENT)
Dept: RADIOLOGY | Facility: MEDICAL CENTER | Age: 39
End: 2024-07-03
Attending: EMERGENCY MEDICINE

## 2024-07-03 ENCOUNTER — HOSPITAL ENCOUNTER (EMERGENCY)
Facility: MEDICAL CENTER | Age: 39
End: 2024-07-04
Attending: EMERGENCY MEDICINE

## 2024-07-03 DIAGNOSIS — R73.9 HYPERGLYCEMIA: ICD-10-CM

## 2024-07-03 DIAGNOSIS — L02.419 AXILLARY ABSCESS: ICD-10-CM

## 2024-07-03 LAB
BASOPHILS # BLD AUTO: 0.3 % (ref 0–1.8)
BASOPHILS # BLD: 0.04 K/UL (ref 0–0.12)
EOSINOPHIL # BLD AUTO: 0.1 K/UL (ref 0–0.51)
EOSINOPHIL NFR BLD: 0.8 % (ref 0–6.9)
ERYTHROCYTE [DISTWIDTH] IN BLOOD BY AUTOMATED COUNT: 42.3 FL (ref 35.9–50)
HCT VFR BLD AUTO: 43.8 % (ref 42–52)
HGB BLD-MCNC: 14.9 G/DL (ref 14–18)
IMM GRANULOCYTES # BLD AUTO: 0.09 K/UL (ref 0–0.11)
IMM GRANULOCYTES NFR BLD AUTO: 0.7 % (ref 0–0.9)
LYMPHOCYTES # BLD AUTO: 3.59 K/UL (ref 1–4.8)
LYMPHOCYTES NFR BLD: 28.9 % (ref 22–41)
MCH RBC QN AUTO: 30.5 PG (ref 27–33)
MCHC RBC AUTO-ENTMCNC: 34 G/DL (ref 32.3–36.5)
MCV RBC AUTO: 89.8 FL (ref 81.4–97.8)
MONOCYTES # BLD AUTO: 1 K/UL (ref 0–0.85)
MONOCYTES NFR BLD AUTO: 8.1 % (ref 0–13.4)
NEUTROPHILS # BLD AUTO: 7.6 K/UL (ref 1.82–7.42)
NEUTROPHILS NFR BLD: 61.2 % (ref 44–72)
NRBC # BLD AUTO: 0 K/UL
NRBC BLD-RTO: 0 /100 WBC (ref 0–0.2)
PLATELET # BLD AUTO: 232 K/UL (ref 164–446)
PMV BLD AUTO: 12.2 FL (ref 9–12.9)
RBC # BLD AUTO: 4.88 M/UL (ref 4.7–6.1)
WBC # BLD AUTO: 12.4 K/UL (ref 4.8–10.8)

## 2024-07-03 PROCEDURE — 71045 X-RAY EXAM CHEST 1 VIEW: CPT

## 2024-07-03 PROCEDURE — 84484 ASSAY OF TROPONIN QUANT: CPT

## 2024-07-03 PROCEDURE — 36415 COLL VENOUS BLD VENIPUNCTURE: CPT

## 2024-07-03 PROCEDURE — 93005 ELECTROCARDIOGRAM TRACING: CPT

## 2024-07-03 PROCEDURE — 80053 COMPREHEN METABOLIC PANEL: CPT

## 2024-07-03 PROCEDURE — 82077 ASSAY SPEC XCP UR&BREATH IA: CPT

## 2024-07-03 PROCEDURE — 93005 ELECTROCARDIOGRAM TRACING: CPT | Performed by: EMERGENCY MEDICINE

## 2024-07-03 PROCEDURE — 99284 EMERGENCY DEPT VISIT MOD MDM: CPT

## 2024-07-03 PROCEDURE — 83690 ASSAY OF LIPASE: CPT

## 2024-07-03 PROCEDURE — 85025 COMPLETE CBC W/AUTO DIFF WBC: CPT

## 2024-07-03 ASSESSMENT — FIBROSIS 4 INDEX: FIB4 SCORE: 0.53

## 2024-07-04 VITALS
RESPIRATION RATE: 16 BRPM | HEART RATE: 85 BPM | SYSTOLIC BLOOD PRESSURE: 158 MMHG | OXYGEN SATURATION: 95 % | TEMPERATURE: 97.5 F | BODY MASS INDEX: 31.07 KG/M2 | DIASTOLIC BLOOD PRESSURE: 94 MMHG | HEIGHT: 67 IN | WEIGHT: 197.97 LBS

## 2024-07-04 LAB
ALBUMIN SERPL BCP-MCNC: 4.1 G/DL (ref 3.2–4.9)
ALBUMIN/GLOB SERPL: 1.3 G/DL
ALP SERPL-CCNC: 171 U/L (ref 30–99)
ALT SERPL-CCNC: 70 U/L (ref 2–50)
ANION GAP SERPL CALC-SCNC: 16 MMOL/L (ref 7–16)
AST SERPL-CCNC: 32 U/L (ref 12–45)
BILIRUB SERPL-MCNC: 0.2 MG/DL (ref 0.1–1.5)
BUN SERPL-MCNC: 14 MG/DL (ref 8–22)
CALCIUM ALBUM COR SERPL-MCNC: 9.7 MG/DL (ref 8.5–10.5)
CALCIUM SERPL-MCNC: 9.8 MG/DL (ref 8.5–10.5)
CHLORIDE SERPL-SCNC: 98 MMOL/L (ref 96–112)
CO2 SERPL-SCNC: 20 MMOL/L (ref 20–33)
CREAT SERPL-MCNC: 0.78 MG/DL (ref 0.5–1.4)
EKG IMPRESSION: NORMAL
ETHANOL BLD-MCNC: <10.1 MG/DL
GFR SERPLBLD CREATININE-BSD FMLA CKD-EPI: 116 ML/MIN/1.73 M 2
GLOBULIN SER CALC-MCNC: 3.1 G/DL (ref 1.9–3.5)
GLUCOSE SERPL-MCNC: 478 MG/DL (ref 65–99)
LIPASE SERPL-CCNC: 68 U/L (ref 11–82)
POTASSIUM SERPL-SCNC: 4 MMOL/L (ref 3.6–5.5)
PROT SERPL-MCNC: 7.2 G/DL (ref 6–8.2)
SODIUM SERPL-SCNC: 134 MMOL/L (ref 135–145)
TROPONIN T SERPL-MCNC: 7 NG/L (ref 6–19)

## 2024-07-04 PROCEDURE — 303977 HCHG I & D

## 2024-07-04 PROCEDURE — 700102 HCHG RX REV CODE 250 W/ 637 OVERRIDE(OP): Performed by: EMERGENCY MEDICINE

## 2024-07-04 PROCEDURE — 700111 HCHG RX REV CODE 636 W/ 250 OVERRIDE (IP): Mod: JZ | Performed by: EMERGENCY MEDICINE

## 2024-07-04 PROCEDURE — A9270 NON-COVERED ITEM OR SERVICE: HCPCS | Performed by: EMERGENCY MEDICINE

## 2024-07-04 PROCEDURE — 700105 HCHG RX REV CODE 258: Performed by: EMERGENCY MEDICINE

## 2024-07-04 RX ORDER — SODIUM CHLORIDE 9 MG/ML
1000 INJECTION, SOLUTION INTRAVENOUS ONCE
Status: COMPLETED | OUTPATIENT
Start: 2024-07-04 | End: 2024-07-04

## 2024-07-04 RX ADMIN — AMOXICILLIN AND CLAVULANATE POTASSIUM 1 TABLET: 875; 125 TABLET, FILM COATED ORAL at 00:56

## 2024-07-04 RX ADMIN — LIDOCAINE HYDROCHLORIDE 10 ML: 10 INJECTION, SOLUTION EPIDURAL; INFILTRATION; INTRACAUDAL; PERINEURAL at 01:32

## 2024-07-04 RX ADMIN — SODIUM CHLORIDE 1000 ML: 9 INJECTION, SOLUTION INTRAVENOUS at 00:53

## 2024-07-04 RX ADMIN — METFORMIN HYDROCHLORIDE 500 MG: 500 TABLET ORAL at 00:55

## 2024-07-04 NOTE — ED TRIAGE NOTES
Chief Complaint   Patient presents with    Shortness of Breath     Pt reports SOB on exertion. States he feels more lethargic than normal. Pt reports HTN and has not taken his medication for a month. Hx of hospitalization for htn.     Abscess     Right axillary abscess x3 weeks. Pt states it has been growing in size. Denies drainage. 6/10 pain.       Patient ambulatory to triage for above complaint. Patient A&Ox4, GCS 15, patient speaking in full sentences. Equal and unlabored respirations. Patient educated on triage process and encouraged to notify staff if condition worsens. Appropriate protocols ordered. Patient returned to the lobby in stable condition.

## 2024-07-04 NOTE — ED PROVIDER NOTES
ED Provider Note    CHIEF COMPLAINT  Chief Complaint   Patient presents with    Shortness of Breath     Pt reports SOB on exertion. States he feels more lethargic than normal. Pt reports HTN and has not taken his medication for a month. Hx of hospitalization for htn.     Abscess     Right axillary abscess x3 weeks. Pt states it has been growing in size. Denies drainage. 6/10 pain.        EXTERNAL RECORDS REVIEWED  Inpatient Notes inpatient hospital note 4/17/2024 reviewed when the patient was brought in for hypoglycemia and chest pain as well as GI bleed rule out    HPI/ROS  LIMITATION TO HISTORY   Select: : None  OUTSIDE HISTORIAN(S):  None    Leo Medellin is a 38 y.o. male who presents to the ER with primary complaint of right axillary abscess but also feeling generally unwell to include dyspnea.  Past medical history as document below.  Please see chart review as above as he was brought into the hospital for chest pain rule out a few months ago    PAST MEDICAL HISTORY   has a past medical history of Alcohol abuse, Alcohol abuse, Alcoholic cirrhosis (HCC), Anxiety, Asthma, Depression, ETOH abuse, Gastritis, Hypertension, Liver failure (HCC), Pancreatitis, and Ulcer.    SURGICAL HISTORY   has a past surgical history that includes appendectomy and appendectomy.    FAMILY HISTORY  Family History   Problem Relation Age of Onset    Diabetes Brother     Hypertension Mother        SOCIAL HISTORY  Social History     Tobacco Use    Smoking status: Former     Types: Cigarettes    Smokeless tobacco: Never   Vaping Use    Vaping status: Every Day    Substances: THC   Substance and Sexual Activity    Alcohol use: Not Currently     Comment: 4 days a week    Drug use: Yes     Types: Inhaled     Comment: marijuana    Sexual activity: Not on file       CURRENT MEDICATIONS  Home Medications       Reviewed by Hue Hastings RMELISSA (Registered Nurse) on 07/03/24 at 3188  Med List Status: Partial     Medication Last  "Dose Status   amitriptyline (ELAVIL) 50 MG Tab  Active   amLODIPine (NORVASC) 5 MG Tab  Active   atorvastatin (LIPITOR) 40 MG Tab  Active   losartan (COZAAR) 50 MG Tab  Active   metFORMIN (GLUCOPHAGE) 500 MG Tab  Active                    ALLERGIES  No Known Allergies    PHYSICAL EXAM  VITAL SIGNS: BP (!) 158/94   Pulse 85   Temp 36.4 °C (97.5 °F) (Temporal)   Resp 16   Ht 1.702 m (5' 7\")   Wt 89.8 kg (197 lb 15.6 oz)   SpO2 95%   BMI 31.01 kg/m²        Pulse ox interpretation: I interpret this pulse ox as normal.  Constitutional: Alert in no apparent distress.  HENT: No signs of trauma, Bilateral external ears normal, Nose normal.   Eyes: Pupils are equal and reactive  Neck: Normal range of motion, No tenderness, Supple  Cardiovascular: Regular rate and rhythm, no murmurs.   Thorax & Lungs: Normal breath sounds, No respiratory distress, No wheezing, No chest tenderness.   Skin: Warm, Dry  Extremities: Right upper extremity: Large mid axillary abscess.  Central fluctuance.  Ultrasound used for initial evaluation which shows large pocket of fluid collection    Musculoskeletal: Good range of motion in all major joints. No tenderness to palpation or major deformities noted.   Neurologic: Alert , Normal motor function, Normal sensory function, No focal deficits noted.   Psychiatric: Affect normal, Judgment normal, Mood normal.         EKG/LABS  Results for orders placed or performed during the hospital encounter of 07/03/24   CBC with Differential   Result Value Ref Range    WBC 12.4 (H) 4.8 - 10.8 K/uL    RBC 4.88 4.70 - 6.10 M/uL    Hemoglobin 14.9 14.0 - 18.0 g/dL    Hematocrit 43.8 42.0 - 52.0 %    MCV 89.8 81.4 - 97.8 fL    MCH 30.5 27.0 - 33.0 pg    MCHC 34.0 32.3 - 36.5 g/dL    RDW 42.3 35.9 - 50.0 fL    Platelet Count 232 164 - 446 K/uL    MPV 12.2 9.0 - 12.9 fL    Neutrophils-Polys 61.20 44.00 - 72.00 %    Lymphocytes 28.90 22.00 - 41.00 %    Monocytes 8.10 0.00 - 13.40 %    Eosinophils 0.80 0.00 - 6.90 " %    Basophils 0.30 0.00 - 1.80 %    Immature Granulocytes 0.70 0.00 - 0.90 %    Nucleated RBC 0.00 0.00 - 0.20 /100 WBC    Neutrophils (Absolute) 7.60 (H) 1.82 - 7.42 K/uL    Lymphs (Absolute) 3.59 1.00 - 4.80 K/uL    Monos (Absolute) 1.00 (H) 0.00 - 0.85 K/uL    Eos (Absolute) 0.10 0.00 - 0.51 K/uL    Baso (Absolute) 0.04 0.00 - 0.12 K/uL    Immature Granulocytes (abs) 0.09 0.00 - 0.11 K/uL    NRBC (Absolute) 0.00 K/uL   Comp Metabolic Panel   Result Value Ref Range    Sodium 134 (L) 135 - 145 mmol/L    Potassium 4.0 3.6 - 5.5 mmol/L    Chloride 98 96 - 112 mmol/L    Co2 20 20 - 33 mmol/L    Anion Gap 16.0 7.0 - 16.0    Glucose 478 (H) 65 - 99 mg/dL    Bun 14 8 - 22 mg/dL    Creatinine 0.78 0.50 - 1.40 mg/dL    Calcium 9.8 8.5 - 10.5 mg/dL    Correct Calcium 9.7 8.5 - 10.5 mg/dL    AST(SGOT) 32 12 - 45 U/L    ALT(SGPT) 70 (H) 2 - 50 U/L    Alkaline Phosphatase 171 (H) 30 - 99 U/L    Total Bilirubin 0.2 0.1 - 1.5 mg/dL    Albumin 4.1 3.2 - 4.9 g/dL    Total Protein 7.2 6.0 - 8.2 g/dL    Globulin 3.1 1.9 - 3.5 g/dL    A-G Ratio 1.3 g/dL   DIAGNOSTIC ALCOHOL   Result Value Ref Range    Diagnostic Alcohol <10.1 <10.1 mg/dL   ESTIMATED GFR   Result Value Ref Range    GFR (CKD-EPI) 116 >60 mL/min/1.73 m 2   TROPONIN   Result Value Ref Range    Troponin T 7 6 - 19 ng/L   LIPASE   Result Value Ref Range    Lipase 68 11 - 82 U/L   EKG   Result Value Ref Range    Report       [facility]    Test Date:  2024  Pt Name:    JACKIE SMITH          Department: ER  MRN:        [id number]                      Room:  Gender:     Male                         Technician: [id number]  :        1985                   Requested By:ER TRIAGE PROTOCOL  Order #:    [id number]                    Reading MD: Dannie Joya    Measurements  Intervals                                Axis  Rate:       83                           P:          50  MT:         126                          QRS:         71  QRSD:       79                           T:          -53  QT:         324  QTc:        381    Interpretive Statements  Sinus rhythm  Borderline repolarization abnormality  Compared to ECG 04/17/2024 11:14:16  T-wave abnormality no longer present  ST (T wave) deviation no longer present  Electronically Signed On 07- 04:30:22 PDT by Dannie Joya         I have independently interpreted this EKG    RADIOLOGY/PROCEDURES   I have independently interpreted the diagnostic imaging associated with this visit and am waiting the final reading from the radiologist.   My preliminary interpretation is as follows: No consolidative process on chest x-ray    Radiologist interpretation:  DX-CHEST-PORTABLE (1 VIEW)   Final Result      No acute cardiac or pulmonary abnormalities are identified.        Incision and drainage: Right axilla evaluated with ultrasound imaging.  Large fluid collection noted.  Lesion is nonvascular.  Skin then prepped with alcohol.  2-1/2 cc of 1% lidocaine without epinephrine infiltrated superficially and into abscess.  #11 blade scalpel was then utilized for stab incision.  1 cm incision was made.  Roughly 5 cc of purulent discharge was initially expressed.  Forceps were then used for Deloculation and wound was again expressed with another 3 to 5 cc of purulent discharge.  Patient tolerated well.  Wound bandaged.    COURSE & MEDICAL DECISION MAKING    ASSESSMENT, COURSE AND PLAN  Care Narrative: Patient presents to the ER with a multitude of complaints.  Primarily here for right axillary abscess but has additional complaints of feeling generally unwell to include some dyspnea.  Prior history of abscesses.  Noncompliant with medications to include metformin for diabetes secondary to his ongoing drinking and concern for possible medication interaction    DISPOSITION AND DISCUSSIONS  I have discussed management of the patient with the following physicians and LUCRETIA's: None    Discussion of management  with other \[facility]\"" or appropriate source(s): Pharmacy for medication verification      Escalation of care considered, and ultimately not performed:acute inpatient care management, however at this time, the patient is most appropriate for outpatient management    Barriers to care at this time, including but not limited to:  None .     Decision tools and prescription drugs considered including, but not limited to: Antibiotics empirically started for abscess .  38-year-old male presenting to the emerged part with above presentation.  Patient is hyperglycemic without acute DKA.  Likely secondary to medication noncompliance.  Additionally abscess to right axilla    Was identified and please see I&D note as above.  Patient sent home with ongoing home wound care status post I&D and need to start and finish antibiotics as prescribed.  Furthermore he has been provided with a dose of his metformin this evening to which he states he will restart with the understanding of its importance.  Furthermore he states that he does have plan to follow-up with his outpatient PCP for routine health maintenance to include his past medical history list to include his diabetes but also his hypertension.  Lastly, we have had prolonged bedside conversation with regards to his ongoing alcohol abuse.  He is understanding that he should decrease and ultimately stop drinking.  He will seek out further outpatient care for this.    FINAL DIAGNOSIS  1. Hyperglycemia    2. Axillary abscess           Electronically signed by: Dannie Joya M.D., 7/3/2024 11:46 PM

## 2024-07-07 ENCOUNTER — HOSPITAL ENCOUNTER (EMERGENCY)
Facility: MEDICAL CENTER | Age: 39
End: 2024-07-07
Attending: STUDENT IN AN ORGANIZED HEALTH CARE EDUCATION/TRAINING PROGRAM

## 2024-07-07 VITALS
BODY MASS INDEX: 30.9 KG/M2 | OXYGEN SATURATION: 94 % | HEIGHT: 67 IN | RESPIRATION RATE: 15 BRPM | DIASTOLIC BLOOD PRESSURE: 87 MMHG | WEIGHT: 196.87 LBS | TEMPERATURE: 98.4 F | SYSTOLIC BLOOD PRESSURE: 145 MMHG | HEART RATE: 84 BPM

## 2024-07-07 DIAGNOSIS — E11.69 TYPE 2 DIABETES MELLITUS WITH OTHER SPECIFIED COMPLICATION, WITHOUT LONG-TERM CURRENT USE OF INSULIN (HCC): ICD-10-CM

## 2024-07-07 DIAGNOSIS — H53.8 BLURRED VISION: ICD-10-CM

## 2024-07-07 LAB
ALBUMIN SERPL BCP-MCNC: 4 G/DL (ref 3.2–4.9)
ALBUMIN/GLOB SERPL: 1.5 G/DL
ALP SERPL-CCNC: 152 U/L (ref 30–99)
ALT SERPL-CCNC: 47 U/L (ref 2–50)
ANION GAP SERPL CALC-SCNC: 14 MMOL/L (ref 7–16)
AST SERPL-CCNC: 17 U/L (ref 12–45)
BASOPHILS # BLD AUTO: 0.4 % (ref 0–1.8)
BASOPHILS # BLD: 0.04 K/UL (ref 0–0.12)
BILIRUB SERPL-MCNC: 0.2 MG/DL (ref 0.1–1.5)
BUN SERPL-MCNC: 10 MG/DL (ref 8–22)
CALCIUM ALBUM COR SERPL-MCNC: 9.6 MG/DL (ref 8.5–10.5)
CALCIUM SERPL-MCNC: 9.6 MG/DL (ref 8.5–10.5)
CHLORIDE SERPL-SCNC: 98 MMOL/L (ref 96–112)
CO2 SERPL-SCNC: 20 MMOL/L (ref 20–33)
CREAT SERPL-MCNC: 0.93 MG/DL (ref 0.5–1.4)
EOSINOPHIL # BLD AUTO: 0.1 K/UL (ref 0–0.51)
EOSINOPHIL NFR BLD: 1 % (ref 0–6.9)
ERYTHROCYTE [DISTWIDTH] IN BLOOD BY AUTOMATED COUNT: 42.5 FL (ref 35.9–50)
GFR SERPLBLD CREATININE-BSD FMLA CKD-EPI: 107 ML/MIN/1.73 M 2
GLOBULIN SER CALC-MCNC: 2.7 G/DL (ref 1.9–3.5)
GLUCOSE BLD STRIP.AUTO-MCNC: 575 MG/DL (ref 65–99)
GLUCOSE SERPL-MCNC: 569 MG/DL (ref 65–99)
HCT VFR BLD AUTO: 46.7 % (ref 42–52)
HGB BLD-MCNC: 15.5 G/DL (ref 14–18)
IMM GRANULOCYTES # BLD AUTO: 0.07 K/UL (ref 0–0.11)
IMM GRANULOCYTES NFR BLD AUTO: 0.7 % (ref 0–0.9)
LYMPHOCYTES # BLD AUTO: 2.87 K/UL (ref 1–4.8)
LYMPHOCYTES NFR BLD: 27.9 % (ref 22–41)
MCH RBC QN AUTO: 30.2 PG (ref 27–33)
MCHC RBC AUTO-ENTMCNC: 33.2 G/DL (ref 32.3–36.5)
MCV RBC AUTO: 91 FL (ref 81.4–97.8)
MONOCYTES # BLD AUTO: 0.81 K/UL (ref 0–0.85)
MONOCYTES NFR BLD AUTO: 7.9 % (ref 0–13.4)
NEUTROPHILS # BLD AUTO: 6.39 K/UL (ref 1.82–7.42)
NEUTROPHILS NFR BLD: 62.1 % (ref 44–72)
NRBC # BLD AUTO: 0 K/UL
NRBC BLD-RTO: 0 /100 WBC (ref 0–0.2)
PLATELET # BLD AUTO: 260 K/UL (ref 164–446)
PMV BLD AUTO: 12.2 FL (ref 9–12.9)
POTASSIUM SERPL-SCNC: 4.3 MMOL/L (ref 3.6–5.5)
PROT SERPL-MCNC: 6.7 G/DL (ref 6–8.2)
RBC # BLD AUTO: 5.13 M/UL (ref 4.7–6.1)
SODIUM SERPL-SCNC: 132 MMOL/L (ref 135–145)
WBC # BLD AUTO: 10.3 K/UL (ref 4.8–10.8)

## 2024-07-07 PROCEDURE — 36415 COLL VENOUS BLD VENIPUNCTURE: CPT

## 2024-07-07 PROCEDURE — 700101 HCHG RX REV CODE 250: Performed by: STUDENT IN AN ORGANIZED HEALTH CARE EDUCATION/TRAINING PROGRAM

## 2024-07-07 PROCEDURE — 85025 COMPLETE CBC W/AUTO DIFF WBC: CPT

## 2024-07-07 PROCEDURE — 82962 GLUCOSE BLOOD TEST: CPT

## 2024-07-07 PROCEDURE — 80053 COMPREHEN METABOLIC PANEL: CPT

## 2024-07-07 PROCEDURE — 99284 EMERGENCY DEPT VISIT MOD MDM: CPT

## 2024-07-07 RX ORDER — PROPARACAINE HYDROCHLORIDE 5 MG/ML
1 SOLUTION/ DROPS OPHTHALMIC ONCE
Status: COMPLETED | OUTPATIENT
Start: 2024-07-07 | End: 2024-07-07

## 2024-07-07 RX ADMIN — PROPARACAINE HYDROCHLORIDE 1 DROP: 5 SOLUTION/ DROPS OPHTHALMIC at 23:00

## 2024-07-07 RX ADMIN — FLUORESCEIN SODIUM 1 MG: 1 STRIP OPHTHALMIC at 23:00

## 2024-07-07 ASSESSMENT — FIBROSIS 4 INDEX: FIB4 SCORE: 0.64

## 2024-07-08 NOTE — DISCHARGE INSTRUCTIONS
As discussed please call the attached number to schedule follow-up appointment with a ophthalmologist for evaluation of your retina.  Continue to work with your primary care doctor for control of your diabetes.

## 2024-07-08 NOTE — ED TRIAGE NOTES
Chief Complaint   Patient presents with    Blurred Vision     Right eye vision changes    High Blood Sugar          37 y/o male with CC of right eye vision changes, Hx of diabetes and ETOH (last drink 2-3 days ago), BGL at home unreadable, subsequent checks showed BGL of 574 today.  in triage. Pt reports intermittent compliance with Metformin Rx. Mild nausea, +headache, increased urinary frequency. Pt Aox4.

## 2024-07-08 NOTE — ED PROVIDER NOTES
ED Provider Note    CHIEF COMPLAINT  Chief Complaint   Patient presents with    Blurred Vision     Right eye vision changes    High Blood Sugar            EXTERNAL RECORDS REVIEWED  Inpatient Notes patient was admitted to the hospital 4/19/2024 in the setting of high blood sugar, nausea and vomiting, metformin noncompliant.  Patient was admitted for hypertensive urgency which was improved with IV labetalol and ultimately he was discharged on amlodipine, losartan with recommendation to continue metformin 500 mg twice daily    HPI/ROS  LIMITATION TO HISTORY   Select: : None      [place] citizen Meliton Medellin is a 38 y.o. male who presents to the emergency department for evaluation of transient vision change in the right eye.  The patient reports 3-4 episodes of very brief transient changes in his vision in the right eye during which she sees floaters and feels a head rush sensation similar to when he stands up too quickly but only in this eye.  He states this occurred today prompting presentation.  He reports chronic blurry vision over the last couple of months as well that is unchanged today.  He currently denies any acute symptoms.  He denies any recent numbness weakness, dizziness, chest pain, shortness of breath, difficulty walking, trouble swallowing or difficulty speaking.  He reports an ongoing history of diabetes for which he very recently started metformin.    PAST MEDICAL HISTORY   has a past medical history of Alcohol abuse, Alcohol abuse, Alcoholic cirrhosis (HCC), Anxiety, Asthma, Depression, ETOH abuse, Gastritis, Hypertension, Liver failure (HCC), Pancreatitis, and Ulcer.    SURGICAL HISTORY   has a past surgical history that includes appendectomy and appendectomy.    FAMILY HISTORY  Family History   Problem Relation Age of Onset    Diabetes Brother     Hypertension Mother        SOCIAL HISTORY  Social History     Tobacco Use    Smoking status: Former     Types: Cigarettes    Smokeless tobacco: Never  "  Vaping Use    Vaping status: Every Day    Substances: THC   Substance and Sexual Activity    Alcohol use: Not Currently     Comment: 4 days a week    Drug use: Yes     Types: Inhaled     Comment: marijuana    Sexual activity: Not on file       CURRENT MEDICATIONS  Home Medications    **Home medications have not yet been reviewed for this encounter**         ALLERGIES  No Known Allergies    PHYSICAL EXAM  VITAL SIGNS: BP (!) 133/96   Pulse 91   Temp 36.4 °C (97.5 °F) (Temporal)   Resp 16   Ht 1.702 m (5' 7\")   Wt 89.3 kg (196 lb 13.9 oz)   SpO2 97%   BMI 30.83 kg/m²    Constitutional: No acute distress, very pleasant and well-appearing.  HENT: Normocephalic, Atraumatic, Bilateral external ears normal. Nose normal.   Eyes: Visual acuity 20/30 OD OS and, 20/30 bilateral, pupils are equal and reactive, extraocular muscles intact, Conjunctiva normal, lids and lashes normal, fluorescein stain with no uptake, no proptosis.  Intraocular pressure 20 x 3 on the right.  Slit-lamp examination with no cell and flare on the right.  No photophobia.  Heart: Regular rate and rythm,   Lungs: No respiratory distress  GI: Soft nontender nondistended   Musculoskeletal: No obvious deformity. No leg edema.  Skin: Warm, Dry, No erythema, No rash.   Neurologic: Alert and oriented x 3, cranial nerves II through XII are intact, ambulates with a steady gait, 5 out of 5 strength and normal sensation throughout.  Psychiatric: Appropriate affect for situation      EKG/LABS  Labs Reviewed   COMP METABOLIC PANEL - Abnormal; Notable for the following components:       Result Value    Sodium 132 (*)     Glucose 569 (*)     Alkaline Phosphatase 152 (*)     All other components within normal limits   POCT GLUCOSE DEVICE RESULTS - Abnormal; Notable for the following components:    POC Glucose, Blood 575 (*)     All other components within normal limits   CBC WITH DIFFERENTIAL   ESTIMATED GFR           COURSE & MEDICAL DECISION " MAKING    ASSESSMENT, COURSE AND PLAN  Care Narrative:     Patient with a history of poorly controlled type 2 diabetes mellitus is presenting for evaluation of transient vision changes in the right eye in the setting of worsening blurry vision over some time.  No acute symptoms currently with resolved visual changes prior to arrival but similar episodes having occurred in the past.  Symptom description not consistent with amaurosis fugax, CVA.  Symptoms are painless therefore do not suspect acute angle-closure glaucoma and while pressure is slightly elevated I suspect this is chronic and not acutely related.  Point-of-care ultrasound performed demonstrating no retinal detachment or vitreous hemorrhage or lens dislocation.  No evidence of iritis or uveitis.  No evidence of corneal abrasion or ulceration.  At this point I suspect diabetic retinopathy, progressive due to his poorly controlled diabetes, serially elevated blood sugar.  Blood sugar quite elevated today at 569 without evidence of DKA or additional abnormality.  I strongly recommended patient continue to adhere to his outpatient diabetic medical therapy, follow-up with his PCP for optimization and a referral was placed for ophthalmology evaluation.  Do not feel any diagnostic imaging is necessary at this time.  Patient comfortable with this plan.  He was discharged stable condition.    ADDITIONAL PROBLEMS MANAGED  None    DISPOSITION AND DISCUSSIONS  I have discussed management of the patient with the following physicians and LUCRETIA's: None    Discussion of management with other [facility] or appropriate source(s): None     Escalation of care considered, and ultimately not performed:diagnostic imaging    FINAL DIAGNOSIS  1. Blurred vision    2. Type 2 diabetes mellitus with other specified complication, without long-term current use of insulin (HCC)           Electronically signed by: Enrique Finley M.D., 7/7/2024 10:47 PM

## 2024-07-08 NOTE — ED NOTES
Discharge teaching with paperwork provided to pt. Pt verbalized understanding of teaching and all questions answered. Pt is A&Ox4 with stable vital signs and stable physical assessment upon discharge. Pt ambulatory out of ED with steady gait with all personal belongings.

## 2024-07-08 NOTE — ED NOTES
Patient ambulated back to Red [facility] with a steady gait. Patient is AxO 4 and GCS 15. The patient was placed on the monitor. Chart up for ERP.

## 2024-07-20 ENCOUNTER — HOSPITAL ENCOUNTER (EMERGENCY)
Facility: MEDICAL CENTER | Age: 39
End: 2024-07-20
Attending: EMERGENCY MEDICINE

## 2024-07-20 VITALS
HEIGHT: 67 IN | TEMPERATURE: 98.9 F | OXYGEN SATURATION: 98 % | DIASTOLIC BLOOD PRESSURE: 83 MMHG | RESPIRATION RATE: 16 BRPM | HEART RATE: 99 BPM | BODY MASS INDEX: 30.42 KG/M2 | WEIGHT: 193.78 LBS | SYSTOLIC BLOOD PRESSURE: 138 MMHG

## 2024-07-20 DIAGNOSIS — L02.91 ABSCESS: ICD-10-CM

## 2024-07-20 DIAGNOSIS — R73.9 HYPERGLYCEMIA: ICD-10-CM

## 2024-07-20 LAB
ALBUMIN SERPL BCP-MCNC: 4.3 G/DL (ref 3.2–4.9)
ALBUMIN/GLOB SERPL: 1.5 G/DL
ALP SERPL-CCNC: 131 U/L (ref 30–99)
ALT SERPL-CCNC: 29 U/L (ref 2–50)
ANION GAP SERPL CALC-SCNC: 13 MMOL/L (ref 7–16)
AST SERPL-CCNC: 14 U/L (ref 12–45)
BASOPHILS # BLD AUTO: 0.4 % (ref 0–1.8)
BASOPHILS # BLD: 0.05 K/UL (ref 0–0.12)
BILIRUB SERPL-MCNC: 0.3 MG/DL (ref 0.1–1.5)
BUN SERPL-MCNC: 9 MG/DL (ref 8–22)
CALCIUM ALBUM COR SERPL-MCNC: 9.6 MG/DL (ref 8.5–10.5)
CALCIUM SERPL-MCNC: 9.8 MG/DL (ref 8.5–10.5)
CHLORIDE SERPL-SCNC: 101 MMOL/L (ref 96–112)
CO2 SERPL-SCNC: 20 MMOL/L (ref 20–33)
CREAT SERPL-MCNC: 0.78 MG/DL (ref 0.5–1.4)
EOSINOPHIL # BLD AUTO: 0.1 K/UL (ref 0–0.51)
EOSINOPHIL NFR BLD: 0.9 % (ref 0–6.9)
ERYTHROCYTE [DISTWIDTH] IN BLOOD BY AUTOMATED COUNT: 41.9 FL (ref 35.9–50)
GFR SERPLBLD CREATININE-BSD FMLA CKD-EPI: 116 ML/MIN/1.73 M 2
GLOBULIN SER CALC-MCNC: 2.8 G/DL (ref 1.9–3.5)
GLUCOSE BLD STRIP.AUTO-MCNC: 269 MG/DL (ref 65–99)
GLUCOSE SERPL-MCNC: 294 MG/DL (ref 65–99)
HCT VFR BLD AUTO: 45.4 % (ref 42–52)
HGB BLD-MCNC: 15.7 G/DL (ref 14–18)
IMM GRANULOCYTES # BLD AUTO: 0.06 K/UL (ref 0–0.11)
IMM GRANULOCYTES NFR BLD AUTO: 0.5 % (ref 0–0.9)
LYMPHOCYTES # BLD AUTO: 3.47 K/UL (ref 1–4.8)
LYMPHOCYTES NFR BLD: 31.1 % (ref 22–41)
MCH RBC QN AUTO: 30.7 PG (ref 27–33)
MCHC RBC AUTO-ENTMCNC: 34.6 G/DL (ref 32.3–36.5)
MCV RBC AUTO: 88.8 FL (ref 81.4–97.8)
MONOCYTES # BLD AUTO: 0.8 K/UL (ref 0–0.85)
MONOCYTES NFR BLD AUTO: 7.2 % (ref 0–13.4)
NEUTROPHILS # BLD AUTO: 6.66 K/UL (ref 1.82–7.42)
NEUTROPHILS NFR BLD: 59.9 % (ref 44–72)
NRBC # BLD AUTO: 0 K/UL
NRBC BLD-RTO: 0 /100 WBC (ref 0–0.2)
PLATELET # BLD AUTO: 305 K/UL (ref 164–446)
PMV BLD AUTO: 12 FL (ref 9–12.9)
POTASSIUM SERPL-SCNC: 4.2 MMOL/L (ref 3.6–5.5)
PROT SERPL-MCNC: 7.1 G/DL (ref 6–8.2)
RBC # BLD AUTO: 5.11 M/UL (ref 4.7–6.1)
SODIUM SERPL-SCNC: 134 MMOL/L (ref 135–145)
WBC # BLD AUTO: 11.1 K/UL (ref 4.8–10.8)

## 2024-07-20 PROCEDURE — 36415 COLL VENOUS BLD VENIPUNCTURE: CPT

## 2024-07-20 PROCEDURE — 82962 GLUCOSE BLOOD TEST: CPT

## 2024-07-20 PROCEDURE — 700101 HCHG RX REV CODE 250: Performed by: EMERGENCY MEDICINE

## 2024-07-20 PROCEDURE — 99284 EMERGENCY DEPT VISIT MOD MDM: CPT

## 2024-07-20 PROCEDURE — RXMED WILLOW AMBULATORY MEDICATION CHARGE: Performed by: EMERGENCY MEDICINE

## 2024-07-20 PROCEDURE — A9270 NON-COVERED ITEM OR SERVICE: HCPCS | Performed by: EMERGENCY MEDICINE

## 2024-07-20 PROCEDURE — 85025 COMPLETE CBC W/AUTO DIFF WBC: CPT

## 2024-07-20 PROCEDURE — 80053 COMPREHEN METABOLIC PANEL: CPT

## 2024-07-20 PROCEDURE — 303977 HCHG I & D

## 2024-07-20 PROCEDURE — 700102 HCHG RX REV CODE 250 W/ 637 OVERRIDE(OP): Performed by: EMERGENCY MEDICINE

## 2024-07-20 RX ORDER — IBUPROFEN 600 MG/1
600 TABLET, FILM COATED ORAL ONCE
Status: COMPLETED | OUTPATIENT
Start: 2024-07-20 | End: 2024-07-20

## 2024-07-20 RX ORDER — DOXYCYCLINE 100 MG/1
100 CAPSULE ORAL 2 TIMES DAILY
Qty: 10 CAPSULE | Refills: 0 | Status: ACTIVE | OUTPATIENT
Start: 2024-07-20 | End: 2024-07-26

## 2024-07-20 RX ORDER — DOXYCYCLINE 100 MG/1
100 TABLET ORAL ONCE
Status: COMPLETED | OUTPATIENT
Start: 2024-07-20 | End: 2024-07-20

## 2024-07-20 RX ORDER — SULFAMETHOXAZOLE/TRIMETHOPRIM 800-160 MG
1 TABLET ORAL ONCE
Status: DISCONTINUED | OUTPATIENT
Start: 2024-07-20 | End: 2024-07-20

## 2024-07-20 RX ORDER — OXYCODONE AND ACETAMINOPHEN 5; 325 MG/1; MG/1
2 TABLET ORAL ONCE
Status: COMPLETED | OUTPATIENT
Start: 2024-07-20 | End: 2024-07-20

## 2024-07-20 RX ORDER — LIDOCAINE HYDROCHLORIDE AND EPINEPHRINE BITARTRATE 20; .01 MG/ML; MG/ML
10 INJECTION, SOLUTION SUBCUTANEOUS ONCE
Status: COMPLETED | OUTPATIENT
Start: 2024-07-20 | End: 2024-07-20

## 2024-07-20 RX ADMIN — DOXYCYCLINE 100 MG: 100 TABLET, FILM COATED ORAL at 18:42

## 2024-07-20 RX ADMIN — LIDOCAINE HYDROCHLORIDE,EPINEPHRINE BITARTRATE 10 ML: 20; .01 INJECTION, SOLUTION INFILTRATION; PERINEURAL at 18:23

## 2024-07-20 RX ADMIN — OXYCODONE AND ACETAMINOPHEN 2 TABLET: 5; 325 TABLET ORAL at 18:42

## 2024-07-20 RX ADMIN — Medication 3 ML: at 17:03

## 2024-07-20 RX ADMIN — IBUPROFEN 600 MG: 600 TABLET, FILM COATED ORAL at 17:03

## 2024-07-20 ASSESSMENT — FIBROSIS 4 INDEX: FIB4 SCORE: 0.37

## 2024-07-20 NOTE — ED PROVIDER NOTES
ER Provider Note    Scribed for Dr. Tray Cullen M.D. by Peter French. 7/20/2024  4:38 PM    Primary Care Provider: CRISTIAN Hamilton    CHIEF COMPLAINT   Chief Complaint   Patient presents with    Abscess     Right axilla      Blurred Vision     EXTERNAL RECORDS REVIEWED  Inpatient Notes Patient was seen here on 7/3/24 and had the abscess drained at that time. He was sent home with ongoing home wound care status post I&D and was told to start and finish antibiotics as prescribed.     HPI/ROS  LIMITATION TO HISTORY   Select: : None  OUTSIDE HISTORIAN(S):  None    Leo Medellin is a 38 y.o. male who presents to the ED complaining of an abscess in the right axilla area onset 1.5 months ago. The patient reports that he came in here about 3 weeks ago to have the abscess drained, but notes that they did not pack it and it closed shortly after he got home. He adds that it was better for a few days, but came back worse and will sometimes drain on its own. The patient states that he was nervous about presenting today as the drainage caused pain to his elbow and arm. He remarks that it was difficult to get antibiotics, so he only started them a few days ago. Patient adds that he has diabetes, and started metformin 1-2 weeks ago. He notes that his primary provider told him to present here. Confirms smoking marijuana. No known drug allergies.    PAST MEDICAL HISTORY  Past Medical History:   Diagnosis Date    Alcohol abuse     Alcohol abuse     5-10 beers    Alcoholic cirrhosis (HCC)     Anxiety     Asthma     Depression     ETOH abuse     Gastritis     Hypertension     Liver failure (HCC)     Pancreatitis     Ulcer     unsure if abdominal/intestinal     SURGICAL HISTORY  Past Surgical History:   Procedure Laterality Date    APPENDECTOMY      CO APPENDECTOMY       FAMILY HISTORY  Family History   Problem Relation Age of Onset    Diabetes Brother     Hypertension Mother      SOCIAL HISTORY   reports that  "he has quit smoking. His smoking use included cigarettes. He has never used smokeless tobacco. He reports current alcohol use. He reports current drug use. Drug: Inhaled.    CURRENT MEDICATIONS  Discharge Medication List as of 7/20/2024  6:49 PM        CONTINUE these medications which have NOT CHANGED    Details   atorvastatin (LIPITOR) 40 MG Tab Take 1 Tablet by mouth every evening., Disp-30 Tablet, R-5, Normal      metFORMIN (GLUCOPHAGE) 500 MG Tab Take 1 Tablet by mouth 2 times a day with meals., Disp-60 Tablet, R-3, Normal      losartan (COZAAR) 50 MG Tab Take 1 Tablet by mouth every day., Disp-30 Tablet, R-5, Normal      amLODIPine (NORVASC) 5 MG Tab Take 1 Tablet by mouth every day., Disp-30 Tablet, R-5, Normal      amitriptyline (ELAVIL) 50 MG Tab Take 50 mg by mouth every evening., Historical Med           ALLERGIES  Patient has no known allergies.    PHYSICAL EXAM  BP (!) 134/94   Pulse 98   Temp 37.2 °C (98.9 °F) (Temporal)   Resp 16   Ht 1.702 m (5' 7\")   Wt 87.9 kg (193 lb 12.6 oz)   SpO2 98%   BMI 30.35 kg/m²   Constitutional: Alert in no apparent distress.  HENT: No signs of trauma, Bilateral external ears normal, Nose normal. Uvula midline.   Eyes: Pupils are equal and reactive, Conjunctiva normal, Non-icteric.   Neck: Normal range of motion, No tenderness, Supple, No stridor.   Lymphatic: No lymphadenopathy noted.   Cardiovascular: Regular rate and rhythm, no murmurs.   Thorax & Lungs: Normal breath sounds, No respiratory distress, No wheezing, No chest tenderness.   Abdomen: Bowel sounds normal, Soft, No tenderness, No peritoneal signs, No masses, No pulsatile masses.   Skin: Warm, Dry, No erythema, No rash. 4.5 x 4.5 cm area of fluctuance in the right axilla.  Back: No bony tenderness, No CVA tenderness.   Extremities: Intact distal pulses, No edema, No tenderness, No cyanosis.  Musculoskeletal: Good range of motion in all major joints. No tenderness to palpation or major deformities noted. "   Neurologic: Alert , Normal motor function, Normal sensory function, No focal deficits noted.   Psychiatric: Affect normal, Judgment normal, Mood normal.     DIAGNOSTIC STUDIES    EKG/LABS  Results for orders placed or performed during the hospital encounter of 07/20/24   CBC with Differential   Result Value Ref Range    WBC 11.1 (H) 4.8 - 10.8 K/uL    RBC 5.11 4.70 - 6.10 M/uL    Hemoglobin 15.7 14.0 - 18.0 g/dL    Hematocrit 45.4 42.0 - 52.0 %    MCV 88.8 81.4 - 97.8 fL    MCH 30.7 27.0 - 33.0 pg    MCHC 34.6 32.3 - 36.5 g/dL    RDW 41.9 35.9 - 50.0 fL    Platelet Count 305 164 - 446 K/uL    MPV 12.0 9.0 - 12.9 fL    Neutrophils-Polys 59.90 44.00 - 72.00 %    Lymphocytes 31.10 22.00 - 41.00 %    Monocytes 7.20 0.00 - 13.40 %    Eosinophils 0.90 0.00 - 6.90 %    Basophils 0.40 0.00 - 1.80 %    Immature Granulocytes 0.50 0.00 - 0.90 %    Nucleated RBC 0.00 0.00 - 0.20 /100 WBC    Neutrophils (Absolute) 6.66 1.82 - 7.42 K/uL    Lymphs (Absolute) 3.47 1.00 - 4.80 K/uL    Monos (Absolute) 0.80 0.00 - 0.85 K/uL    Eos (Absolute) 0.10 0.00 - 0.51 K/uL    Baso (Absolute) 0.05 0.00 - 0.12 K/uL    Immature Granulocytes (abs) 0.06 0.00 - 0.11 K/uL    NRBC (Absolute) 0.00 K/uL   Comp Metabolic Panel   Result Value Ref Range    Sodium 134 (L) 135 - 145 mmol/L    Potassium 4.2 3.6 - 5.5 mmol/L    Chloride 101 96 - 112 mmol/L    Co2 20 20 - 33 mmol/L    Anion Gap 13.0 7.0 - 16.0    Glucose 294 (H) 65 - 99 mg/dL    Bun 9 8 - 22 mg/dL    Creatinine 0.78 0.50 - 1.40 mg/dL    Calcium 9.8 8.5 - 10.5 mg/dL    Correct Calcium 9.6 8.5 - 10.5 mg/dL    AST(SGOT) 14 12 - 45 U/L    ALT(SGPT) 29 2 - 50 U/L    Alkaline Phosphatase 131 (H) 30 - 99 U/L    Total Bilirubin 0.3 0.1 - 1.5 mg/dL    Albumin 4.3 3.2 - 4.9 g/dL    Total Protein 7.1 6.0 - 8.2 g/dL    Globulin 2.8 1.9 - 3.5 g/dL    A-G Ratio 1.5 g/dL   ESTIMATED GFR   Result Value Ref Range    GFR (CKD-EPI) 116 >60 mL/min/1.73 m 2   POCT glucose device results   Result Value Ref Range     POC Glucose, Blood 269 (H) 65 - 99 mg/dL     I have independently interpreted these labs.    RADIOLOGY/PROCEDURES     Point of Care Ultrasound    ED ULTRASOUND GUIDED ABSCESS INCISION AND DRAINAGE    Indication: axillary abscess    Procedure: Bedside ultrasound was utilized to identify and localize fluid collection and image retained as below.      The patient tolerated the procedure well.    Complications: None     Image retained through [facility] as seen below:       Additional interpretation:    This study is a limited ultrasound examination performed and interpreted to evaluate for limited conditions as outlined above. There may be other clinically important information contained in the images that is outside this scope. When clinically warranted, a comprehensive ultrasound through the appropriate department is considered.    INCISION AND DRAINAGE PROCEDURE NOTE:  Patient identification was confirmed and consent was obtained.  This procedure was performed by Dr. Cullen at bedside.  Site: Right axilla  Sterile procedures observed  Needle size: 23 gauge  Anesthetic used (type and amt): 5 cc of 2% lido w/ epi.  Blade size: 11   Drainage: Copious amount of purulent discharge.  Site anesthetized, incision made over site, wound drained and explored loculations, rinsed with copious amounts of normal saline, wound loop drain in place, covered with dry, sterile dressing. Pt tolerated procedure well without complications. Instructions for care discussed verbally and pt provided with additional written instructions for homecare and f/u.    COURSE & MEDICAL DECISION MAKING     ASSESSMENT, COURSE AND PLAN  Care Narrative:       4:39 PM - Patient was seen and evaluated at bedside. Patient presents to the ED for evaluation of an abscess located in his right axilla which was drained around 3 weeks ago.  After my exam, I discussed with the patient the plan of care, which includes treating the patient with medication for their  symptoms, as well as obtaining lab work and imaging for further evaluation. Patient understands and verbalizes agreement to plan of care. Patient will be treated with Motrin 600 mg oral. Ordered CBC w/ diff., CMP, POCT UA docked device, and POCT glucose docked device to evaluate.   Differential diagnoses include but not limited to:   Patient with abscess to axilla that is recurrent  Patient referred to surgery for further management of this abscess  Patient given doxycycline  Patient with mild hyperglycemia in emergency department and instructed to continue compliance with metformin  Bactrim was not chosen due to patient's currently on metformin  No crepitus to suggest necrotizing fasciitis at this time  Patient with significant treatment symptoms prior to discharge home    5:50 PM - Performed ultrasound of the abscess, and incision & drainage procedure was done at this time. There were no complications and the patient tolerated the procedure well. Ordered Percocet 5-325 mg oral 2 tablets.    6:42 PM - I reevaluated the patient at bedside. I discussed plan for discharge and follow up as outlined below. The patient is stable for discharge at this time and will return for any new or worsening symptoms. Patient verbalizes understanding and support with my plan for discharge.           DISPOSITION AND DISCUSSIONS  I have discussed management of the patient with the following physicians and LUCRETIA's:  None    Discussion of management with other [facility] or appropriate source(s): None   Barriers to care at this time, including but not limited to:  No known barriers of care .     The patient will return for new or worsening symptoms and is stable at the time of discharge.    The patient is referred to a primary physician for blood pressure management, diabetic screening, and for all other preventative health concerns.    DISPOSITION:  Patient will be discharged home in stable condition.    FOLLOW UP:  Gladys Hernandez,  A.P.N.  [address]  [facility] [id number] [phone]    In 3 days      [facility], Emergency Dept  [address] [facility] [place]  [place] [address] [phone]    If symptoms worsen    Ben Ny M.D.  [address]  [address]  [facility] [id number] [phone]      call to schedule surgery follow up    OUTPATIENT MEDICATIONS:  Discharge Medication List as of 7/20/2024  6:49 PM        START taking these medications    Details   doxycycline (MONODOX) 100 MG capsule Take 1 Capsule by mouth 2 times a day for 5 days., Disp-10 Capsule, R-0, Normal           FINAL DIANGOSIS  1. Abscess    2. Hyperglycemia       IPeter (Scribe), am scribing for, and in the presence of, Tray Cullen M.D.    Electronically signed by: Peter French (Scribe), 7/20/2024    ITray M.D. personally performed the services described in this documentation, as scribed by Peter French in my presence, and it is both accurate and complete.     The note accurately reflects work and decisions made by me.  Tray Cullen M.D.  7/20/2024  10:38 PM

## 2024-07-20 NOTE — ED TRIAGE NOTES
Chief Complaint   Patient presents with    Abscess     Right axilla      Blurred Vision     Ambulatory to triage w/ c/o an abscess to R axilla.  Hx of same, requiring an I&D.  Patient also reports blurred vision which has been a chronic issue but reports getting worse. Patient is a diabetic, ran out of his test strips and hasn't been monitoring his glucose and just recently started taking his metformin again.  FSBS 239.    alert

## 2024-07-21 ENCOUNTER — PHARMACY VISIT (OUTPATIENT)
Dept: PHARMACY | Facility: MEDICAL CENTER | Age: 39
End: 2024-07-21
Payer: COMMERCIAL

## 2024-07-21 NOTE — DISCHARGE INSTRUCTIONS
Move loop drain daily to get more puss/discharge out.  Cut or untie and remove loop drain after 5 days.   Call surgery to schedule follow up

## 2024-07-22 NOTE — DISCHARGE PLANNING
Pt called to obtain information about where to follow up for surgery. Pt was instructed to follow up with Ben Ny M.D.   Why: call to schedule surgery follow up  Specialty: Surgery  Contact:  Junior Scruggs [facility]  [place] [address] [phone]

## 2025-01-15 ENCOUNTER — HOSPITAL ENCOUNTER (EMERGENCY)
Facility: MEDICAL CENTER | Age: 40
End: 2025-01-15
Attending: EMERGENCY MEDICINE

## 2025-01-15 ENCOUNTER — APPOINTMENT (OUTPATIENT)
Dept: RADIOLOGY | Facility: MEDICAL CENTER | Age: 40
End: 2025-01-15
Attending: EMERGENCY MEDICINE

## 2025-01-15 VITALS
DIASTOLIC BLOOD PRESSURE: 103 MMHG | BODY MASS INDEX: 27.44 KG/M2 | OXYGEN SATURATION: 95 % | WEIGHT: 174.82 LBS | RESPIRATION RATE: 20 BRPM | HEIGHT: 67 IN | HEART RATE: 84 BPM | TEMPERATURE: 98 F | SYSTOLIC BLOOD PRESSURE: 159 MMHG

## 2025-01-15 DIAGNOSIS — F41.9 ANXIETY: ICD-10-CM

## 2025-01-15 DIAGNOSIS — I16.0 HYPERTENSIVE URGENCY: ICD-10-CM

## 2025-01-15 DIAGNOSIS — F10.10 ALCOHOL ABUSE: ICD-10-CM

## 2025-01-15 DIAGNOSIS — I10 HYPERTENSION, UNSPECIFIED TYPE: ICD-10-CM

## 2025-01-15 DIAGNOSIS — E11.9 TYPE 2 DIABETES MELLITUS WITHOUT COMPLICATION, WITHOUT LONG-TERM CURRENT USE OF INSULIN (HCC): ICD-10-CM

## 2025-01-15 DIAGNOSIS — R07.9 CHEST PAIN, UNSPECIFIED TYPE: ICD-10-CM

## 2025-01-15 DIAGNOSIS — E78.2 MIXED HYPERLIPIDEMIA: ICD-10-CM

## 2025-01-15 LAB
ALBUMIN SERPL BCP-MCNC: 4.5 G/DL (ref 3.2–4.9)
ALBUMIN/GLOB SERPL: 1.5 G/DL
ALP SERPL-CCNC: 146 U/L (ref 30–99)
ALT SERPL-CCNC: 47 U/L (ref 2–50)
ANION GAP SERPL CALC-SCNC: 17 MMOL/L (ref 7–16)
AST SERPL-CCNC: 20 U/L (ref 12–45)
B-OH-BUTYR SERPL-MCNC: 0.37 MMOL/L (ref 0.02–0.27)
BASOPHILS # BLD AUTO: 0.3 % (ref 0–1.8)
BASOPHILS # BLD: 0.03 K/UL (ref 0–0.12)
BILIRUB SERPL-MCNC: 0.3 MG/DL (ref 0.1–1.5)
BUN SERPL-MCNC: 5 MG/DL (ref 8–22)
CALCIUM ALBUM COR SERPL-MCNC: 8.9 MG/DL (ref 8.5–10.5)
CALCIUM SERPL-MCNC: 9.3 MG/DL (ref 8.5–10.5)
CHLORIDE SERPL-SCNC: 102 MMOL/L (ref 96–112)
CO2 SERPL-SCNC: 19 MMOL/L (ref 20–33)
CREAT SERPL-MCNC: 0.57 MG/DL (ref 0.5–1.4)
EKG IMPRESSION: NORMAL
EOSINOPHIL # BLD AUTO: 0.08 K/UL (ref 0–0.51)
EOSINOPHIL NFR BLD: 0.7 % (ref 0–6.9)
ERYTHROCYTE [DISTWIDTH] IN BLOOD BY AUTOMATED COUNT: 42.3 FL (ref 35.9–50)
GFR SERPLBLD CREATININE-BSD FMLA CKD-EPI: 127 ML/MIN/1.73 M 2
GLOBULIN SER CALC-MCNC: 3 G/DL (ref 1.9–3.5)
GLUCOSE SERPL-MCNC: 249 MG/DL (ref 65–99)
HCT VFR BLD AUTO: 48.1 % (ref 42–52)
HGB BLD-MCNC: 16.9 G/DL (ref 14–18)
IMM GRANULOCYTES # BLD AUTO: 0.05 K/UL (ref 0–0.11)
IMM GRANULOCYTES NFR BLD AUTO: 0.5 % (ref 0–0.9)
LYMPHOCYTES # BLD AUTO: 3.81 K/UL (ref 1–4.8)
LYMPHOCYTES NFR BLD: 35.7 % (ref 22–41)
MCH RBC QN AUTO: 31.4 PG (ref 27–33)
MCHC RBC AUTO-ENTMCNC: 35.1 G/DL (ref 32.3–36.5)
MCV RBC AUTO: 89.2 FL (ref 81.4–97.8)
MONOCYTES # BLD AUTO: 0.66 K/UL (ref 0–0.85)
MONOCYTES NFR BLD AUTO: 6.2 % (ref 0–13.4)
NEUTROPHILS # BLD AUTO: 6.04 K/UL (ref 1.82–7.42)
NEUTROPHILS NFR BLD: 56.6 % (ref 44–72)
NRBC # BLD AUTO: 0 K/UL
NRBC BLD-RTO: 0 /100 WBC (ref 0–0.2)
NT-PROBNP SERPL IA-MCNC: 44 PG/ML (ref 0–125)
PLATELET # BLD AUTO: 271 K/UL (ref 164–446)
PMV BLD AUTO: 12.2 FL (ref 9–12.9)
POTASSIUM SERPL-SCNC: 3.9 MMOL/L (ref 3.6–5.5)
PROT SERPL-MCNC: 7.5 G/DL (ref 6–8.2)
RBC # BLD AUTO: 5.39 M/UL (ref 4.7–6.1)
SODIUM SERPL-SCNC: 138 MMOL/L (ref 135–145)
TROPONIN T SERPL-MCNC: <6 NG/L (ref 6–19)
TROPONIN T SERPL-MCNC: <6 NG/L (ref 6–19)
WBC # BLD AUTO: 10.7 K/UL (ref 4.8–10.8)

## 2025-01-15 PROCEDURE — 85025 COMPLETE CBC W/AUTO DIFF WBC: CPT

## 2025-01-15 PROCEDURE — 83880 ASSAY OF NATRIURETIC PEPTIDE: CPT

## 2025-01-15 PROCEDURE — 71045 X-RAY EXAM CHEST 1 VIEW: CPT

## 2025-01-15 PROCEDURE — 93005 ELECTROCARDIOGRAM TRACING: CPT | Mod: TC

## 2025-01-15 PROCEDURE — 700105 HCHG RX REV CODE 258: Performed by: EMERGENCY MEDICINE

## 2025-01-15 PROCEDURE — 99284 EMERGENCY DEPT VISIT MOD MDM: CPT

## 2025-01-15 PROCEDURE — 93005 ELECTROCARDIOGRAM TRACING: CPT | Mod: TC | Performed by: EMERGENCY MEDICINE

## 2025-01-15 PROCEDURE — 700102 HCHG RX REV CODE 250 W/ 637 OVERRIDE(OP): Performed by: EMERGENCY MEDICINE

## 2025-01-15 PROCEDURE — 80053 COMPREHEN METABOLIC PANEL: CPT

## 2025-01-15 PROCEDURE — 82010 KETONE BODYS QUAN: CPT

## 2025-01-15 PROCEDURE — A9270 NON-COVERED ITEM OR SERVICE: HCPCS | Performed by: EMERGENCY MEDICINE

## 2025-01-15 PROCEDURE — 84484 ASSAY OF TROPONIN QUANT: CPT

## 2025-01-15 PROCEDURE — 36415 COLL VENOUS BLD VENIPUNCTURE: CPT

## 2025-01-15 RX ORDER — ATORVASTATIN CALCIUM 40 MG/1
40 TABLET, FILM COATED ORAL EVERY EVENING
Qty: 30 TABLET | Refills: 3 | Status: SHIPPED | OUTPATIENT
Start: 2025-01-15

## 2025-01-15 RX ORDER — AMLODIPINE BESYLATE 5 MG/1
5 TABLET ORAL ONCE
Status: COMPLETED | OUTPATIENT
Start: 2025-01-15 | End: 2025-01-15

## 2025-01-15 RX ORDER — AMITRIPTYLINE HYDROCHLORIDE 50 MG/1
50 TABLET ORAL NIGHTLY
Qty: 30 TABLET | Refills: 3 | Status: SHIPPED | OUTPATIENT
Start: 2025-01-15

## 2025-01-15 RX ORDER — SODIUM CHLORIDE 9 MG/ML
1000 INJECTION, SOLUTION INTRAVENOUS ONCE
Status: COMPLETED | OUTPATIENT
Start: 2025-01-15 | End: 2025-01-15

## 2025-01-15 RX ORDER — LOSARTAN POTASSIUM 50 MG/1
50 TABLET ORAL DAILY
Qty: 30 TABLET | Refills: 3 | Status: SHIPPED | OUTPATIENT
Start: 2025-01-15

## 2025-01-15 RX ORDER — LORAZEPAM 1 MG/1
1 TABLET ORAL ONCE
Status: COMPLETED | OUTPATIENT
Start: 2025-01-15 | End: 2025-01-15

## 2025-01-15 RX ORDER — LOSARTAN POTASSIUM 50 MG/1
50 TABLET ORAL ONCE
Status: COMPLETED | OUTPATIENT
Start: 2025-01-15 | End: 2025-01-15

## 2025-01-15 RX ORDER — AMLODIPINE BESYLATE 5 MG/1
5 TABLET ORAL DAILY
Qty: 30 TABLET | Refills: 3 | Status: SHIPPED | OUTPATIENT
Start: 2025-01-15

## 2025-01-15 RX ADMIN — LOSARTAN POTASSIUM 50 MG: 50 TABLET, FILM COATED ORAL at 18:01

## 2025-01-15 RX ADMIN — LORAZEPAM 1 MG: 1 TABLET ORAL at 17:59

## 2025-01-15 RX ADMIN — SODIUM CHLORIDE 1000 ML: 9 INJECTION, SOLUTION INTRAVENOUS at 18:02

## 2025-01-15 RX ADMIN — AMLODIPINE BESYLATE 5 MG: 5 TABLET ORAL at 17:59

## 2025-01-15 ASSESSMENT — PAIN DESCRIPTION - PAIN TYPE: TYPE: ACUTE PAIN

## 2025-01-15 ASSESSMENT — HEART SCORE
HEART SCORE: 2
TROPONIN: LESS THAN OR EQUAL TO NORMAL LIMIT
RISK FACTORS: >2 RISK FACTORS OR HX OF ATHEROSCLEROTIC DISEASE
AGE: <45
ECG: NORMAL
HISTORY: SLIGHTLY SUSPICIOUS

## 2025-01-15 ASSESSMENT — FIBROSIS 4 INDEX: FIB4 SCORE: 0.34

## 2025-01-16 NOTE — ED NOTES
Bedside report received from off going RN/tech: Danna RN, assumed care of patient.  POC discussed with patient. Call light within reach, all needs addressed at this time.     Fall risk interventions in place: Patient's personal possessions are with in their safe reach, Place socks on patient, Place fall risk sign on patient's door, Give patient urinal if applicable, Keep floor surfaces clean and dry, and Accompanied to restroom (all applicable per [facility] Fall risk assessment)   Continuous monitoring: Cardiac Leads, Pulse Ox, or Blood Pressure  IVF/IV medications: Not Applicable   Oxygen: Room Air  Bedside sitter: Not Applicable   Isolation: Not Applicable

## 2025-01-16 NOTE — DISCHARGE INSTRUCTIONS
You were seen in the Emergency Department for chest pain.    EKG, labs, chest xray were completed without significant acute abnormalities.    Please use 1,000mg of tylenol or 600mg of ibuprofen every 6 hours as needed for pain.  Please start taking your home medication and follow-up with your doctor.  Avoid further alcohol use.    Please follow up with your primary care physician.    Return to the Emergency Department with worsening chest pain, trouble breathing, severe lightheadedness or fainting, or other concerns.

## 2025-01-16 NOTE — ED NOTES
"Pt discharged home via self. IV discontinued and gauze placed, pt in possession of belongings. Pt provided discharge education and information pertaining to medications and follow up appointments. Pt received copy of discharge instructions and verbalized understanding. Discussed signs and symptoms to return to the ER, patient verbalized understanding. Pt is A/O x 4, ambulatory with a steady gait.      BP (!) 159/103   Pulse 84   Temp 36.7 °C (98 °F) (Temporal)   Resp 20   Ht 1.702 m (5' 7\")   Wt 79.3 kg (174 lb 13.2 oz)   SpO2 95%   BMI 27.38 kg/m²    "

## 2025-01-16 NOTE — ED TRIAGE NOTES
"Chief Complaint   Patient presents with    Chest Pain     Pt presents with intermittent chest pain and tightness that started approximately 45 minutes ago. Pt states he has a history of anxiety, but hasn't had any medication for it in a long time.        Pt ambulatory to triage. Pt A&Ox4, for above complaint.     Pt to lobby . Pt educated on alerting staff in changes to condition. Pt verbalized understanding. Protocol chest pain.     BP (!) 169/104   Pulse 90   Temp (!) 20.9 °C (69.6 °F) (Temporal)   Resp 16   Ht 1.702 m (5' 7\")   Wt 79.3 kg (174 lb 13.2 oz)   SpO2 100%   BMI 27.38 kg/m²     "
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
## 2025-01-28 ENCOUNTER — TELEPHONE (OUTPATIENT)
Dept: HEALTH INFORMATION MANAGEMENT | Facility: OTHER | Age: 40
End: 2025-01-28

## 2025-05-28 ENCOUNTER — HOSPITAL ENCOUNTER (EMERGENCY)
Facility: MEDICAL CENTER | Age: 40
End: 2025-05-28
Attending: EMERGENCY MEDICINE | Admitting: HOSPITALIST

## 2025-05-28 ENCOUNTER — APPOINTMENT (OUTPATIENT)
Dept: RADIOLOGY | Facility: MEDICAL CENTER | Age: 40
End: 2025-05-28
Attending: EMERGENCY MEDICINE

## 2025-05-28 VITALS
OXYGEN SATURATION: 98 % | HEART RATE: 80 BPM | BODY MASS INDEX: 29.13 KG/M2 | WEIGHT: 185.63 LBS | DIASTOLIC BLOOD PRESSURE: 109 MMHG | HEIGHT: 67 IN | TEMPERATURE: 97.8 F | RESPIRATION RATE: 16 BRPM | SYSTOLIC BLOOD PRESSURE: 171 MMHG

## 2025-05-28 DIAGNOSIS — I10 HYPERTENSION, UNSPECIFIED TYPE: ICD-10-CM

## 2025-05-28 DIAGNOSIS — K85.90 ACUTE PANCREATITIS, UNSPECIFIED COMPLICATION STATUS, UNSPECIFIED PANCREATITIS TYPE: Primary | ICD-10-CM

## 2025-05-28 DIAGNOSIS — F10.10 ALCOHOL ABUSE: ICD-10-CM

## 2025-05-28 LAB
ALBUMIN SERPL BCP-MCNC: 4.3 G/DL (ref 3.2–4.9)
ALBUMIN/GLOB SERPL: 1.6 G/DL
ALP SERPL-CCNC: 111 U/L (ref 30–99)
ALT SERPL-CCNC: 64 U/L (ref 2–50)
ANION GAP SERPL CALC-SCNC: 11 MMOL/L (ref 7–16)
AST SERPL-CCNC: 42 U/L (ref 12–45)
BASOPHILS # BLD AUTO: 0.5 % (ref 0–1.8)
BASOPHILS # BLD: 0.05 K/UL (ref 0–0.12)
BILIRUB SERPL-MCNC: 0.2 MG/DL (ref 0.1–1.5)
BUN SERPL-MCNC: 11 MG/DL (ref 8–22)
CALCIUM ALBUM COR SERPL-MCNC: 8.9 MG/DL (ref 8.5–10.5)
CALCIUM SERPL-MCNC: 9.1 MG/DL (ref 8.5–10.5)
CHLORIDE SERPL-SCNC: 108 MMOL/L (ref 96–112)
CO2 SERPL-SCNC: 21 MMOL/L (ref 20–33)
CREAT SERPL-MCNC: 0.9 MG/DL (ref 0.5–1.4)
EKG IMPRESSION: NORMAL
EOSINOPHIL # BLD AUTO: 0.16 K/UL (ref 0–0.51)
EOSINOPHIL NFR BLD: 1.8 % (ref 0–6.9)
ERYTHROCYTE [DISTWIDTH] IN BLOOD BY AUTOMATED COUNT: 45.7 FL (ref 35.9–50)
ETHANOL BLD-MCNC: <10.1 MG/DL
GFR SERPLBLD CREATININE-BSD FMLA CKD-EPI: 110 ML/MIN/1.73 M 2
GLOBULIN SER CALC-MCNC: 2.7 G/DL (ref 1.9–3.5)
GLUCOSE SERPL-MCNC: 158 MG/DL (ref 65–99)
HCT VFR BLD AUTO: 48.1 % (ref 42–52)
HGB BLD-MCNC: 16 G/DL (ref 14–18)
IMM GRANULOCYTES # BLD AUTO: 0.04 K/UL (ref 0–0.11)
IMM GRANULOCYTES NFR BLD AUTO: 0.4 % (ref 0–0.9)
LIPASE SERPL-CCNC: 374 U/L (ref 11–82)
LYMPHOCYTES # BLD AUTO: 3.62 K/UL (ref 1–4.8)
LYMPHOCYTES NFR BLD: 39.7 % (ref 22–41)
MCH RBC QN AUTO: 31 PG (ref 27–33)
MCHC RBC AUTO-ENTMCNC: 33.3 G/DL (ref 32.3–36.5)
MCV RBC AUTO: 93.2 FL (ref 81.4–97.8)
MONOCYTES # BLD AUTO: 0.57 K/UL (ref 0–0.85)
MONOCYTES NFR BLD AUTO: 6.3 % (ref 0–13.4)
NEUTROPHILS # BLD AUTO: 4.68 K/UL (ref 1.82–7.42)
NEUTROPHILS NFR BLD: 51.3 % (ref 44–72)
NRBC # BLD AUTO: 0 K/UL
NRBC BLD-RTO: 0 /100 WBC (ref 0–0.2)
PLATELET # BLD AUTO: 243 K/UL (ref 164–446)
PMV BLD AUTO: 11.6 FL (ref 9–12.9)
POTASSIUM SERPL-SCNC: 4 MMOL/L (ref 3.6–5.5)
PROT SERPL-MCNC: 7 G/DL (ref 6–8.2)
RBC # BLD AUTO: 5.16 M/UL (ref 4.7–6.1)
SODIUM SERPL-SCNC: 140 MMOL/L (ref 135–145)
WBC # BLD AUTO: 9.1 K/UL (ref 4.8–10.8)

## 2025-05-28 PROCEDURE — 76705 ECHO EXAM OF ABDOMEN: CPT

## 2025-05-28 PROCEDURE — 93005 ELECTROCARDIOGRAM TRACING: CPT | Mod: TC | Performed by: EMERGENCY MEDICINE

## 2025-05-28 PROCEDURE — 700111 HCHG RX REV CODE 636 W/ 250 OVERRIDE (IP): Mod: JZ | Performed by: EMERGENCY MEDICINE

## 2025-05-28 PROCEDURE — 83690 ASSAY OF LIPASE: CPT

## 2025-05-28 PROCEDURE — 80053 COMPREHEN METABOLIC PANEL: CPT

## 2025-05-28 PROCEDURE — 82077 ASSAY SPEC XCP UR&BREATH IA: CPT

## 2025-05-28 PROCEDURE — 85025 COMPLETE CBC W/AUTO DIFF WBC: CPT

## 2025-05-28 PROCEDURE — 96374 THER/PROPH/DIAG INJ IV PUSH: CPT

## 2025-05-28 PROCEDURE — 700105 HCHG RX REV CODE 258: Performed by: EMERGENCY MEDICINE

## 2025-05-28 PROCEDURE — 36415 COLL VENOUS BLD VENIPUNCTURE: CPT

## 2025-05-28 PROCEDURE — 99284 EMERGENCY DEPT VISIT MOD MDM: CPT

## 2025-05-28 RX ORDER — ONDANSETRON 2 MG/ML
4 INJECTION INTRAMUSCULAR; INTRAVENOUS ONCE
Status: COMPLETED | OUTPATIENT
Start: 2025-05-28 | End: 2025-05-28

## 2025-05-28 RX ORDER — METFORMIN HYDROCHLORIDE EXTENDED-RELEASE TABLETS 1000 MG/1
1000 TABLET, FILM COATED, EXTENDED RELEASE ORAL EVERY MORNING
COMMUNITY

## 2025-05-28 RX ORDER — HYDROMORPHONE HYDROCHLORIDE 1 MG/ML
0.5 INJECTION, SOLUTION INTRAMUSCULAR; INTRAVENOUS; SUBCUTANEOUS ONCE
Status: DISCONTINUED | OUTPATIENT
Start: 2025-05-28 | End: 2025-05-28 | Stop reason: HOSPADM

## 2025-05-28 RX ORDER — INSULIN GLARGINE 100 [IU]/ML
15 INJECTION, SOLUTION SUBCUTANEOUS EVERY EVENING
COMMUNITY

## 2025-05-28 RX ORDER — ONDANSETRON 4 MG/1
4 TABLET, ORALLY DISINTEGRATING ORAL EVERY 8 HOURS PRN
Qty: 12 TABLET | Refills: 0 | Status: SHIPPED | OUTPATIENT
Start: 2025-05-28

## 2025-05-28 RX ORDER — SODIUM CHLORIDE 9 MG/ML
INJECTION, SOLUTION INTRAVENOUS CONTINUOUS
Status: DISCONTINUED | OUTPATIENT
Start: 2025-05-28 | End: 2025-05-28 | Stop reason: HOSPADM

## 2025-05-28 RX ADMIN — ONDANSETRON 4 MG: 2 INJECTION INTRAMUSCULAR; INTRAVENOUS at 07:41

## 2025-05-28 RX ADMIN — SODIUM CHLORIDE: 9 INJECTION, SOLUTION INTRAVENOUS at 07:42

## 2025-05-28 ASSESSMENT — PAIN DESCRIPTION - PAIN TYPE
TYPE: ACUTE PAIN
TYPE: ACUTE PAIN

## 2025-05-28 ASSESSMENT — FIBROSIS 4 INDEX: FIB4 SCORE: 0.43

## 2025-05-28 NOTE — ED NOTES
Bedside report received from off going RN/tech: Gloria, assumed care of patient.  POC discussed with patient. Call light within reach, all needs addressed at this time.       Fall risk interventions in place: Move the patient closer to the nurse's station, Keep floor surfaces clean and dry, and Accompanied to restroom (all applicable per [facility] Fall risk assessment)   Continuous monitoring: Cardiac Leads, Pulse Ox, or Blood Pressure  IVF/IV medications: Not Applicable   Oxygen: Room Air  Bedside sitter: Not Applicable   Isolation: Not Applicable    Patient on monitor and VSS  EKG ordered.

## 2025-05-28 NOTE — DISCHARGE INSTRUCTIONS
Plenty of fluids.  Start with a bland diet and then slowly advance from there.  Certainly no fatty or greasy foods for the next several days.      Keep a journal of your blood pressure, take this to your primary doctor within the next week to assess whether you might need to be back on your blood pressure medicine.

## 2025-05-28 NOTE — ED PROVIDER NOTES
"ED Provider Note    CHIEF COMPLAINT  Chief Complaint   Patient presents with    N/V     This morning started having N/V. Pt checked his BP and reports it was high. DM he reports he checked it and it was 126 this morning. Pt reports he feels fatigue, shaky, weak and tired. Pt reports he has been compliant with medications. GCS 15.     Abdominal Pain       EXTERNAL RECORDS REVIEWED  Inpatient Notes  , Outpatient Notes  , and Outpatient labs & studies admitted April last year for hyperglycemia and hypertension, has an ultrasound back fibber 2022 which was negative aside from a nodule in his liver, and a CT scan back in April of last year which showed hepatic steatosis. Labs show transaminitis previously.    HPI/ROS  LIMITATION TO HISTORY   None  OUTSIDE HISTORIAN(S):      Leo Medellin is a 39 y.o. male who presents complaining of feeling poorly.  He was fine last night, this morning however, he felt unwell.  A lot of nausea, vomiting, dizziness, some belly pain.  He just does not feel good.  He feels very shaky and weak and generally tired.  He has been diagnosed with hypertension but is been following with his doctor, and is actually off of his medications.  Every time we checked it sounds like he had a normal blood pressure.  Today however, he notes it was elevated again, is not sure whether that is why he is feeling so poorly.  He denies any stool changes.  Note that he has had a transaminitis in the past.  He is also had imaging which shows no gallstones.  When asked about drinking, he states \"I am an alcoholic.\"  No recent changes here.  No fever or chills.  Just does not feel well.    PAST MEDICAL HISTORY   has a past medical history of Alcohol abuse, Alcohol abuse, Alcoholic cirrhosis (HCC), Anxiety, Asthma, Depression, ETOH abuse, Gastritis, Hypertension, Liver failure (HCC), Pancreatitis, and Ulcer.    SURGICAL HISTORY   has a past surgical history that includes appendectomy and appendectomy.    FAMILY " "HISTORY  Family History   Problem Relation Age of Onset    Diabetes Brother     Hypertension Mother        SOCIAL HISTORY  Social History     Tobacco Use    Smoking status: Former     Types: Cigarettes    Smokeless tobacco: Never   Vaping Use    Vaping status: Every Day    Substances: THC   Substance and Sexual Activity    Alcohol use: Yes     Comment: occasional    Drug use: Yes     Types: Inhaled     Comment: marijuana    Sexual activity: Not on file       CURRENT MEDICATIONS  Home Medications       Reviewed by Mayra Whiteside R.N. (Registered Nurse) on 05/28/25 at 0616  Med List Status: Not Addressed     Medication Last Dose Status   amitriptyline (ELAVIL) 50 MG Tab  Active   amLODIPine (NORVASC) 5 MG Tab  Active   atorvastatin (LIPITOR) 40 MG Tab  Active   losartan (COZAAR) 50 MG Tab  Active   metFORMIN (GLUCOPHAGE) 500 MG Tab  Active                    ALLERGIES  Allergies[1]    PHYSICAL EXAM  VITAL SIGNS: BP (!) 175/121   Pulse 84   Temp 36.6 °C (97.8 °F) (Temporal)   Resp 16   Ht 1.702 m (5' 7\")   Wt 84.2 kg (185 lb 10 oz)   SpO2 99%   BMI 29.07 kg/m²    Constitutional: Well appearing patient in no acute distress.  HENT: Head is without trauma.  Oropharynx is clear.  Mucous membranes are moist.  Eyes: Sclerae are nonicteric, pupils are equally round.  Neck: Supple with grossly normal range of motion.  Cardiovascular: Heart is regular rate and rhythm without murmur rub or gallop.  Peripheral pulses are intact and symmetric throughout.  Thorax & Lungs: Breathing easily.  Good air movement.  There is no wheeze, rhonchi or rales.  Abdomen: Bowel sounds normal, soft, non-distended.  He has midepigastric tenderness with palpation.  There is no rebound or guarding.  There is no mass appreciated.  There is no Barajas sign.  Skin: No apparent rash.  I do not see petechiae or purpura.  Extremities: No evidence of acute trauma.  No clubbing, cyanosis, edema, no Homans or cords.  Neurologic: Alert. Moving all " extremities.  Intact sensation and strength throughout.  Gait is normal.  Psychiatric: Normal for situation      EKG/LABS  Labs Reviewed   COMP METABOLIC PANEL - Abnormal; Notable for the following components:       Result Value    Glucose 158 (*)     ALT(SGPT) 64 (*)     Alkaline Phosphatase 111 (*)     All other components within normal limits   LIPASE - Abnormal; Notable for the following components:    Lipase 374 (*)     All other components within normal limits   CBC WITH DIFFERENTIAL   ESTIMATED GFR   DIAGNOSTIC ALCOHOL       I have independently interpreted this EKG    RADIOLOGY/PROCEDURES   I have independently interpreted the diagnostic imaging associated with this visit and am waiting the final reading from the radiologist.   My preliminary interpretation is as follows:     Radiologist interpretation:  US-RUQ   Final Result      Hepatic steatosis with focal fatty sparing about the gallbladder fossa.             COURSE & MEDICAL DECISION MAKING    ASSESSMENT, COURSE AND PLAN  Care Narrative: This patient presents with abdominal pain.  Laboratories in triage show a transaminitis, as well as an elevation of his lipase, above previous levels.  At this time, I do note he has some mild hypertension here, for now, we will keep an eye on this.  We will initiate IV fluids, will give him Dilaudid and Zofran for pain and nausea respectively.  Although he has had negative gallbladder studies in the past, I feel that we need to do this today to ensure that there is no gallstone pancreatitis.  However, my suspicion is alcohol related pancreatitis and transaminitis.      Ultrasound as above shows fatty liver with no ductal dilatation or stones.    Upon recheck, patient is  in epigastric area.  Fluids are running.  We talked about his alcohol use, he drinks pretty heavily, last being yesterday.  He is interested in stopping.  At this point, we talked about the pros and cons of inpatient versus outpatient, and  he favors admission for bowel rest, IV fluids, and treatment of his pancreatitis as well as [facility] protocol for alcohol detoxification.  This was discussed with the , and she is recommended acute inpatient versus obs status.    I was called back to the patient's room.  The patient is asked about possibly going home instead.  He points out that he has never had bad alcohol withdrawal before, and thinks he should be able to stop without difficulty.  We talked about benzos, however again he thinks he should be fine without this.  He does not think he needs anything for pain, and in fact he has not received anything here.  We talked about pain medicine at home, and he has declined this here, and does not think he needs anything for home.  He would like to Zofran to help with the nausea.    We talked about the potential life-threatening complications of alcohol withdrawal, but with shared decision making, and his lack of previous withdrawal in the past, I do think that it is reasonable for him to follow-up in the outpatient setting.  He is given instructions about pancreatitis as well as alcohol withdrawal.  Best to keep a journal of his blood pressures.  As noted, he previously been antihypertensives, but then was off, I have asked him to follow-up with his primary to see if he will need to restart this.    Dr. Lyons called back, and we have discussed that the patient has elected to go home, with the above factors.    DISPOSITION AND DISCUSSIONS  I have discussed management of the patient with the following physicians and LUCRETIA's: Hospitalist    Discussion of management with other QHP or appropriate source(s): Case Management       Escalation of care considered, and ultimately not performed: Does not need the ICU at this time.  Later, with shared decision making, he has elected not to be hospitalized and will be going home.    Barriers to care at this time, including but not limited to: .     Decision tools  and prescription drugs considered including, but not limited to: .    FINAL DIAGNOSIS  1. Acute pancreatitis, unspecified complication status, unspecified pancreatitis type    2. Alcohol abuse    3. Hypertension, unspecified type         Electronically signed by: Vince Childers M.D., 5/28/2025 7:43 AM           [1] No Known Allergies

## 2025-05-28 NOTE — ED NOTES
Green top sent down to ETOH level. Patient refused pain medication at this time. 2/10 pain in head and LUQ.  On monitor and VSS  Patient states last drink was at 1900 last night. Reports has been drinking everyday for the last 6 months and his drinking has increased.   Denies seizures when he detox from ETOH.

## 2025-05-28 NOTE — ED NOTES
D/C home with written and verbal instructions re: Rx, activity, f/u.  Verbalizes understanding.  Ambulated out with steady gait. A+Ox4. VSS  Patient given ETOH resources.   Educated on when to return to the ed.

## 2025-05-28 NOTE — ED NOTES
Pharmacy Medication Reconciliation      ~Medication reconciliation updated and complete per patient at bedside & patient home pharmacy   ~Allergies have been verified   ~No oral ABX within the last 30 days  ~Is dispense history available in EPIC: no  ~Patient home pharmacy :  [facility]       ~Anticoagulants (rivaroxaban, apixaban, edoxaban, dabigatran, warfarin, enoxaparin) taken in the last 14 days? No

## 2025-05-28 NOTE — ED NOTES
Patient called in and told RN that he did not want to be admitted and just wants to go home.   ERP to bedside to talk with patient.

## 2025-05-28 NOTE — ED TRIAGE NOTES
"Chief Complaint   Patient presents with    N/V     This morning started having N/V. Pt checked his BP and reports it was high. DM he reports he checked it and it was 126 this morning. Pt reports he feels fatigue, shaky, weak and tired. Pt reports he has been compliant with medications. GCS 15.     Abdominal Pain       39 y.o. male ambulatory to triage for above complaint. Pt reports he drinks every other day. States he feels \"full\" like bloated in his abdomen.     Pt is alert and oriented, speaking in full sentences, follows commands and responds appropriately to questions. NAD. Resp are even and unlabored.      Pt placed in lobby. Pt educated on triage process. Pt encouraged to alert staff for any changes.     Patient and staff wearing appropriate PPE    BP (!) 157/114   Pulse 92   Temp 36.6 °C (97.8 °F) (Temporal)   Resp 16   Ht 1.702 m (5' 7\")   Wt 84.2 kg (185 lb 10 oz)   SpO2 98%   BMI 29.07 kg/m²    "

## 2025-05-28 NOTE — ED NOTES
Patient ambulatory to green 30. Agree with triage note. Placed in gown and on monitor. ERP to see.

## 2025-05-29 ENCOUNTER — APPOINTMENT (OUTPATIENT)
Dept: RADIOLOGY | Facility: MEDICAL CENTER | Age: 40
End: 2025-05-29
Attending: EMERGENCY MEDICINE

## 2025-05-29 ENCOUNTER — HOSPITAL ENCOUNTER (EMERGENCY)
Facility: MEDICAL CENTER | Age: 40
End: 2025-05-29
Attending: EMERGENCY MEDICINE

## 2025-05-29 VITALS
WEIGHT: 184.53 LBS | TEMPERATURE: 96.6 F | HEART RATE: 80 BPM | DIASTOLIC BLOOD PRESSURE: 106 MMHG | OXYGEN SATURATION: 97 % | SYSTOLIC BLOOD PRESSURE: 162 MMHG | BODY MASS INDEX: 28.96 KG/M2 | RESPIRATION RATE: 12 BRPM | HEIGHT: 67 IN

## 2025-05-29 DIAGNOSIS — R10.13 EPIGASTRIC ABDOMINAL PAIN: Primary | ICD-10-CM

## 2025-05-29 DIAGNOSIS — K85.90 ACUTE PANCREATITIS, UNSPECIFIED COMPLICATION STATUS, UNSPECIFIED PANCREATITIS TYPE: ICD-10-CM

## 2025-05-29 DIAGNOSIS — F10.90 ALCOHOL USE DISORDER: ICD-10-CM

## 2025-05-29 LAB
ALBUMIN SERPL BCP-MCNC: 4.1 G/DL (ref 3.2–4.9)
ALBUMIN/GLOB SERPL: 1.5 G/DL
ALP SERPL-CCNC: 105 U/L (ref 30–99)
ALT SERPL-CCNC: 52 U/L (ref 2–50)
ANION GAP SERPL CALC-SCNC: 10 MMOL/L (ref 7–16)
APPEARANCE UR: CLEAR
AST SERPL-CCNC: 29 U/L (ref 12–45)
BASOPHILS # BLD AUTO: 0.6 % (ref 0–1.8)
BASOPHILS # BLD: 0.05 K/UL (ref 0–0.12)
BILIRUB SERPL-MCNC: 0.3 MG/DL (ref 0.1–1.5)
BILIRUB UR QL STRIP.AUTO: NEGATIVE
BUN SERPL-MCNC: 8 MG/DL (ref 8–22)
CALCIUM ALBUM COR SERPL-MCNC: 8.7 MG/DL (ref 8.5–10.5)
CALCIUM SERPL-MCNC: 8.8 MG/DL (ref 8.5–10.5)
CHLORIDE SERPL-SCNC: 106 MMOL/L (ref 96–112)
CO2 SERPL-SCNC: 21 MMOL/L (ref 20–33)
COLOR UR: YELLOW
CREAT SERPL-MCNC: 0.91 MG/DL (ref 0.5–1.4)
EOSINOPHIL # BLD AUTO: 0.11 K/UL (ref 0–0.51)
EOSINOPHIL NFR BLD: 1.3 % (ref 0–6.9)
ERYTHROCYTE [DISTWIDTH] IN BLOOD BY AUTOMATED COUNT: 45.7 FL (ref 35.9–50)
GFR SERPLBLD CREATININE-BSD FMLA CKD-EPI: 109 ML/MIN/1.73 M 2
GLOBULIN SER CALC-MCNC: 2.7 G/DL (ref 1.9–3.5)
GLUCOSE SERPL-MCNC: 142 MG/DL (ref 65–99)
GLUCOSE UR STRIP.AUTO-MCNC: NEGATIVE MG/DL
HCT VFR BLD AUTO: 48 % (ref 42–52)
HGB BLD-MCNC: 16.2 G/DL (ref 14–18)
IMM GRANULOCYTES # BLD AUTO: 0.06 K/UL (ref 0–0.11)
IMM GRANULOCYTES NFR BLD AUTO: 0.7 % (ref 0–0.9)
KETONES UR STRIP.AUTO-MCNC: NEGATIVE MG/DL
LEUKOCYTE ESTERASE UR QL STRIP.AUTO: NEGATIVE
LIPASE SERPL-CCNC: 256 U/L (ref 11–82)
LYMPHOCYTES # BLD AUTO: 2.38 K/UL (ref 1–4.8)
LYMPHOCYTES NFR BLD: 28.9 % (ref 22–41)
MCH RBC QN AUTO: 31.3 PG (ref 27–33)
MCHC RBC AUTO-ENTMCNC: 33.8 G/DL (ref 32.3–36.5)
MCV RBC AUTO: 92.7 FL (ref 81.4–97.8)
MICRO URNS: NORMAL
MONOCYTES # BLD AUTO: 0.52 K/UL (ref 0–0.85)
MONOCYTES NFR BLD AUTO: 6.3 % (ref 0–13.4)
NEUTROPHILS # BLD AUTO: 5.12 K/UL (ref 1.82–7.42)
NEUTROPHILS NFR BLD: 62.2 % (ref 44–72)
NITRITE UR QL STRIP.AUTO: NEGATIVE
NRBC # BLD AUTO: 0 K/UL
NRBC BLD-RTO: 0 /100 WBC (ref 0–0.2)
PH UR STRIP.AUTO: 6.5 [PH] (ref 5–8)
PLATELET # BLD AUTO: 240 K/UL (ref 164–446)
PMV BLD AUTO: 11.8 FL (ref 9–12.9)
POTASSIUM SERPL-SCNC: 4.1 MMOL/L (ref 3.6–5.5)
PROT SERPL-MCNC: 6.8 G/DL (ref 6–8.2)
PROT UR QL STRIP: NEGATIVE MG/DL
RBC # BLD AUTO: 5.18 M/UL (ref 4.7–6.1)
RBC UR QL AUTO: NEGATIVE
SODIUM SERPL-SCNC: 137 MMOL/L (ref 135–145)
SP GR UR STRIP.AUTO: 1.04
UROBILINOGEN UR STRIP.AUTO-MCNC: 0.2 EU/DL
WBC # BLD AUTO: 8.2 K/UL (ref 4.8–10.8)

## 2025-05-29 PROCEDURE — 94760 N-INVAS EAR/PLS OXIMETRY 1: CPT

## 2025-05-29 PROCEDURE — 700117 HCHG RX CONTRAST REV CODE 255: Performed by: EMERGENCY MEDICINE

## 2025-05-29 PROCEDURE — 80053 COMPREHEN METABOLIC PANEL: CPT

## 2025-05-29 PROCEDURE — 83690 ASSAY OF LIPASE: CPT

## 2025-05-29 PROCEDURE — 700105 HCHG RX REV CODE 258: Performed by: EMERGENCY MEDICINE

## 2025-05-29 PROCEDURE — 700111 HCHG RX REV CODE 636 W/ 250 OVERRIDE (IP): Performed by: EMERGENCY MEDICINE

## 2025-05-29 PROCEDURE — 74177 CT ABD & PELVIS W/CONTRAST: CPT

## 2025-05-29 PROCEDURE — 85025 COMPLETE CBC W/AUTO DIFF WBC: CPT

## 2025-05-29 PROCEDURE — 96375 TX/PRO/DX INJ NEW DRUG ADDON: CPT

## 2025-05-29 PROCEDURE — 36415 COLL VENOUS BLD VENIPUNCTURE: CPT

## 2025-05-29 PROCEDURE — 96374 THER/PROPH/DIAG INJ IV PUSH: CPT

## 2025-05-29 PROCEDURE — 81003 URINALYSIS AUTO W/O SCOPE: CPT

## 2025-05-29 PROCEDURE — 99285 EMERGENCY DEPT VISIT HI MDM: CPT

## 2025-05-29 RX ORDER — ONDANSETRON 2 MG/ML
4 INJECTION INTRAMUSCULAR; INTRAVENOUS ONCE
Status: COMPLETED | OUTPATIENT
Start: 2025-05-29 | End: 2025-05-29

## 2025-05-29 RX ORDER — MORPHINE SULFATE 4 MG/ML
4 INJECTION INTRAVENOUS ONCE
Status: COMPLETED | OUTPATIENT
Start: 2025-05-29 | End: 2025-05-29

## 2025-05-29 RX ORDER — SODIUM CHLORIDE 9 MG/ML
1000 INJECTION, SOLUTION INTRAVENOUS ONCE
Status: COMPLETED | OUTPATIENT
Start: 2025-05-29 | End: 2025-05-29

## 2025-05-29 RX ADMIN — ONDANSETRON 4 MG: 2 INJECTION INTRAMUSCULAR; INTRAVENOUS at 06:50

## 2025-05-29 RX ADMIN — IOHEXOL 100 ML: 350 INJECTION, SOLUTION INTRAVENOUS at 07:15

## 2025-05-29 RX ADMIN — SODIUM CHLORIDE 1000 ML: 9 INJECTION, SOLUTION INTRAVENOUS at 06:50

## 2025-05-29 RX ADMIN — MORPHINE SULFATE 4 MG: 4 INJECTION INTRAVENOUS at 06:50

## 2025-05-29 ASSESSMENT — FIBROSIS 4 INDEX: FIB4 SCORE: 0.86

## 2025-05-29 NOTE — DISCHARGE INSTRUCTIONS
Follow-up with primary care 1 to 2 days for reevaluation, medication management and close blood pressure monitoring.    Continue home medications as previously indicated.  Add Tylenol or ibuprofen as needed for discomfort.    Encourage oral fluid hydration.  Clear liquid diet for 48 hours, then advance to bland, low-salt diet as tolerated.    Do not drink alcohol, this likely contributed to your pancreatitis.  You are encouraged to  seek detox or substance abuse counseling as indicated.    Return to the emergency department for altered mental status, seizure hallucination, vomiting, persistent or worsening abdominal pain, fever or other new concerns.

## 2025-05-29 NOTE — ED PROVIDER NOTES
ED Provider Note    CHIEF COMPLAINT  Chief Complaint   Patient presents with    Abdominal Pain     Dx. With pancreatitis 5/28, elected to go home vs. Admission, returns for continued pain       EXTERNAL RECORDS REVIEWED  Outpatient Notes ED evaluation yesterday for nausea vomiting abdominal pain.  Feeling poorly.  Diagnosed with pancreatitis with lipase 374.  History of alcohol abuse.  Ultrasound was within normal limits, no evidence for gallstone.  Admission offered but patient chose to discharge home    HPI/ROS  LIMITATION TO HISTORY   Select: : None  OUTSIDE HISTORIAN(S):  None    Prydeinig Meliton Medellin is a 39 y.o. male who presents to the emergency department through triage for abdominal pain.  Patient states he was seen here yesterday and diagnosed with pancreatitis.  Did not have much discomfort at that time and discharged home.  Returned as he woke up this morning with some discomfort.  Nausea without vomiting.  Tolerating food and fluids.  No fever.  No chest pain,  shortness of breath or syncope.    Patient states he had pancreatitis once previously, that was at the onset of diabetic diagnosis.  He does drink alcohol routinely, daily but states overall drinks less than he did at that prior ration.  Has not drink since he was seen here yesterday.    PAST MEDICAL HISTORY   has a past medical history of Alcohol abuse, Alcohol abuse, Alcoholic cirrhosis (HCC), Anxiety, Asthma, Depression, ETOH abuse, Gastritis, Hypertension, Liver failure (HCC), Pancreatitis, and Ulcer.    SURGICAL HISTORY   has a past surgical history that includes appendectomy and appendectomy.    FAMILY HISTORY  Family History   Problem Relation Age of Onset    Diabetes Brother     Hypertension Mother        SOCIAL HISTORY  Social History     Tobacco Use    Smoking status: Former     Types: Cigarettes    Smokeless tobacco: Never   Vaping Use    Vaping status: Every Day    Substances: THC   Substance and Sexual Activity    Alcohol use: Yes      "Comment: occasional    Drug use: Yes     Types: Inhaled     Comment: marijuana    Sexual activity: Not on file       CURRENT MEDICATIONS  Home Medications       Reviewed by Beto Bautista R.N. (Registered Nurse) on 05/29/25 at 0531  Med List Status: <None>     Medication Last Dose Status   amitriptyline (ELAVIL) 50 MG Tab  Active   insulin glargine (LANTUS) 100 UNIT/ML SC SOLN  Active   metFORMIN ER 1000 MG TABLET SR 24 HR  Active   ondansetron (ZOFRAN ODT) 4 MG TABLET DISPERSIBLE  Active                    ALLERGIES  Allergies[1]    PHYSICAL EXAM  VITAL SIGNS: BP (!) 162/106   Pulse 80   Temp 35.9 °C (96.6 °F) (Temporal)   Resp 12   Ht 1.702 m (5' 7\")   Wt 83.7 kg (184 lb 8.4 oz)   SpO2 97%   BMI 28.90 kg/m²    Pulse ox interpretation: I interpret this pulse ox as normal.  Constitutional: Alert in no apparent distress.  HENT: Normocephalic, atraumatic. Bilateral external ears normal, Nose normal.   Eyes: Conjunctiva normal.   Neck: Normal range of motion,  Cardiovascular: Regular rate and rhythm, no murmurs. Distal pulses intact.    Thorax & Lungs: Normal breath sounds.  No wheezing/rales/ronchi. No increased work of breathing  Abdomen: Soft, non-distended.  Mild tenderness palpation across the upper abdomen, epigastric region without guarding or peritonitis.  No palpable pulsatile mass.  Skin: Warm, Dry, No erythema, No rash.   Musculoskeletal: Good range of motion in all major joints.   Neurologic: Alert and orient x 4.  Speech clear and cohesive  Psychiatric: Affect normal, Judgment normal, Mood normal.       EKG/LABS  Results for orders placed or performed during the hospital encounter of 05/29/25   CBC WITH DIFFERENTIAL    Collection Time: 05/29/25  5:58 AM   Result Value Ref Range    WBC 8.2 4.8 - 10.8 K/uL    RBC 5.18 4.70 - 6.10 M/uL    Hemoglobin 16.2 14.0 - 18.0 g/dL    Hematocrit 48.0 42.0 - 52.0 %    MCV 92.7 81.4 - 97.8 fL    MCH 31.3 27.0 - 33.0 pg    MCHC 33.8 32.3 - 36.5 g/dL    RDW " 45.7 35.9 - 50.0 fL    Platelet Count 240 164 - 446 K/uL    MPV 11.8 9.0 - 12.9 fL    Neutrophils-Polys 62.20 44.00 - 72.00 %    Lymphocytes 28.90 22.00 - 41.00 %    Monocytes 6.30 0.00 - 13.40 %    Eosinophils 1.30 0.00 - 6.90 %    Basophils 0.60 0.00 - 1.80 %    Immature Granulocytes 0.70 0.00 - 0.90 %    Nucleated RBC 0.00 0.00 - 0.20 /100 WBC    Neutrophils (Absolute) 5.12 1.82 - 7.42 K/uL    Lymphs (Absolute) 2.38 1.00 - 4.80 K/uL    Monos (Absolute) 0.52 0.00 - 0.85 K/uL    Eos (Absolute) 0.11 0.00 - 0.51 K/uL    Baso (Absolute) 0.05 0.00 - 0.12 K/uL    Immature Granulocytes (abs) 0.06 0.00 - 0.11 K/uL    NRBC (Absolute) 0.00 K/uL   COMP METABOLIC PANEL    Collection Time: 05/29/25  5:58 AM   Result Value Ref Range    Sodium 137 135 - 145 mmol/L    Potassium 4.1 3.6 - 5.5 mmol/L    Chloride 106 96 - 112 mmol/L    Co2 21 20 - 33 mmol/L    Anion Gap 10.0 7.0 - 16.0    Glucose 142 (H) 65 - 99 mg/dL    Bun 8 8 - 22 mg/dL    Creatinine 0.91 0.50 - 1.40 mg/dL    Calcium 8.8 8.5 - 10.5 mg/dL    Correct Calcium 8.7 8.5 - 10.5 mg/dL    AST(SGOT) 29 12 - 45 U/L    ALT(SGPT) 52 (H) 2 - 50 U/L    Alkaline Phosphatase 105 (H) 30 - 99 U/L    Total Bilirubin 0.3 0.1 - 1.5 mg/dL    Albumin 4.1 3.2 - 4.9 g/dL    Total Protein 6.8 6.0 - 8.2 g/dL    Globulin 2.7 1.9 - 3.5 g/dL    A-G Ratio 1.5 g/dL   LIPASE    Collection Time: 05/29/25  5:58 AM   Result Value Ref Range    Lipase 256 (H) 11 - 82 U/L   ESTIMATED GFR    Collection Time: 05/29/25  5:58 AM   Result Value Ref Range    GFR (CKD-EPI) 109 >60 mL/min/1.73 m 2   URINALYSIS    Collection Time: 05/29/25  8:10 AM    Specimen: Urine, Clean Catch   Result Value Ref Range    Color Yellow     Character Clear     Specific Gravity 1.036 <1.035    Ph 6.5 5.0 - 8.0    Glucose Negative Negative mg/dL    Ketones Negative Negative mg/dL    Protein Negative Negative mg/dL    Bilirubin Negative Negative    Urobilinogen, Urine 0.2 <=1.0 EU/dL    Nitrite Negative Negative    Leukocyte  Esterase Negative Negative    Occult Blood Negative Negative    Micro Urine Req see below      ]    RADIOLOGY/PROCEDURES     Radiologist interpretation:  CT-ABDOMEN-PELVIS WITH   Final Result         1.  Hepatomegaly and changes of cirrhosis.   2.  Diverticulosis.   3.  Diffuse bladder wall thickening, consider changes of cystitis, correlate with urinalysis.   4.  Small fat-containing left inguinal hernia.   5.  Right sacral sclerotic focus, appearance suggests bone island, otherwise indeterminate.   6.  Atherosclerosis             COURSE & MEDICAL DECISION MAKING    ASSESSMENT, COURSE AND PLAN  Care Narrative:   6:15 AM seen evaluated bedside.  No acute distress.  Tender to palpation across the upper abdomen, greatest in the epigastric region without guarding or peritonitis.  He is hemodynamically stable without fever, tachycardia, hypotension.  Has not drink alcohol since discharge yesterday.  Denies history of severe withdrawal symptoms but does drink beer daily otherwise.  Has had pancreatitis previously but states this was at diagnosis of new onset diabetes a few years ago.  Will repeat labs, add CT as patient may need hospitalization for symptom control.  Add IV fluid as he is clinically dehydrated not been drinking any fluids at home although he has been eating a diet.  Add pain control.    6:39 AM initial set of labs without leukocytosis or anemia.  No electrolyte derangement.  Minimal elevation in ALT, alk phos but normal AST, total bilirubin.  Lipase is 256, but was 374 yesterday.    CT is fairly unrevealing, hepatomegaly and changes of cirrhosis not unexpected with history of chronic alcohol dependence.  Diverticulosis.  Bladder wall thickening consider cystitis, urinalysis pending.  Left inguinal hernia.    Patient reevaluated bedside.  Feels well overall.  Requesting something to drink.  Will p.o. challenge.    Urinalysis is unremarkable.    Patient tolerating oral fluids without difficulty.  No further  nausea, no vomiting.  Abdominal exam is benign.  Pain controlled with single dose medication on arrival.  Again discussion versus discharge home and admission, with improving lipase, no CT evidence for pancreatitis or other acute process, will trial discharge home with pain control and dietary changes.  Alcohol cessation recommended, outpatient follow-up and resources provided for detox and substance abuse counseling    ADDITIONAL PROBLEMS MANAGED  Alcohol use disorder, CT with changes of cirrhosis    DISPOSITION AND DISCUSSIONS    Discussion of management with other [facility] or appropriate source(s): None     Escalation of care considered, and ultimately not performed:acute inpatient care management, however at this time, the patient is most appropriate for outpatient management    Barriers to care at this time, including but not limited to: None.     Decision tools and prescription drugs considered including, but not limited to: Pain Medications Tylenol ibuprofen were appropriate in the setting given only mild pain and history of alcohol dependence.    The patient is stable for discharge home, anticipatory guidance provided, close follow-up is encouraged and strict return instructions have been discussed. Patient is agreeable to the disposition and plan.      FINAL DIAGNOSIS  1. Epigastric abdominal pain    2. Acute pancreatitis, unspecified complication status, unspecified pancreatitis type    3. Alcohol use disorder         Electronically signed by: Dori Abraham D.O., 5/29/2025 6:15 AM           [1] No Known Allergies

## 2025-05-29 NOTE — ED NOTES
Patient discharged home per ERP. Discharge teaching, education, and POC discussed with patient who verbalizes understanding. Patient instructed to return to the ER if symptoms worsen. No other questions at this time.    PIV removed and dressing applied. Vitals are stable. Patient alert and oriented.  Pt ambulated off unit safely.

## 2025-05-29 NOTE — ED TRIAGE NOTES
"Chief Complaint   Patient presents with    Abdominal Pain     Dx. With pancreatitis 5/28, elected to go home vs. Admission, returns for continued pain     Patient ambulatory to triage for the above complaint. Patient states that he was seen and recommended to be admitted yesterday but he elected to go home to manage his symptoms. Patient now returns for continued pain.    Pt is alert and oriented, speaking in full sentences, follows commands and responds appropriately to questions. Resp are even and unlabored.      Pt placed in lobby. Pt educated on triage process. Pt encouraged to alert staff for any changes.     Patient and staff wearing appropriate PPE.    BP (!) 157/84   Pulse 84   Temp 35.9 °C (96.6 °F) (Temporal)   Resp 14   Ht 1.702 m (5' 7\")   Wt 83.7 kg (184 lb 8.4 oz)   SpO2 98%     "

## 2025-05-29 NOTE — ED NOTES
Bedside report received from SABINE Yates.  POC discussed with patient. Call light within reach, all needs addressed at this time.     Fall risk interventions in place: Move the patient closer to the nurse's station, Patient's personal possessions are with in their safe reach, Place fall risk sign on patient's door, and Keep floor surfaces clean and dry (all applicable per [facility] Fall risk assessment)   Continuous monitoring: Cardiac Leads, Pulse Ox, or Blood Pressure  IVF/IV medications: NS  Oxygen: Room Air  Bedside sitter: Not Applicable   Isolation: Not Applicable

## 2025-08-17 ENCOUNTER — HOSPITAL ENCOUNTER (EMERGENCY)
Facility: MEDICAL CENTER | Age: 40
End: 2025-08-17
Attending: EMERGENCY MEDICINE

## 2025-08-17 ENCOUNTER — APPOINTMENT (OUTPATIENT)
Dept: RADIOLOGY | Facility: MEDICAL CENTER | Age: 40
End: 2025-08-17
Attending: EMERGENCY MEDICINE

## 2025-08-17 VITALS
HEART RATE: 100 BPM | OXYGEN SATURATION: 98 % | RESPIRATION RATE: 16 BRPM | BODY MASS INDEX: 30.76 KG/M2 | SYSTOLIC BLOOD PRESSURE: 166 MMHG | TEMPERATURE: 97.3 F | DIASTOLIC BLOOD PRESSURE: 106 MMHG | HEIGHT: 67 IN | WEIGHT: 195.99 LBS

## 2025-08-17 DIAGNOSIS — R51.9 NONINTRACTABLE HEADACHE, UNSPECIFIED CHRONICITY PATTERN, UNSPECIFIED HEADACHE TYPE: Primary | ICD-10-CM

## 2025-08-17 LAB
ALBUMIN SERPL BCP-MCNC: 4 G/DL (ref 3.2–4.9)
ALBUMIN/GLOB SERPL: 1.3 G/DL
ALP SERPL-CCNC: 99 U/L (ref 30–99)
ALT SERPL-CCNC: 28 U/L (ref 2–50)
ANION GAP SERPL CALC-SCNC: 11 MMOL/L (ref 7–16)
AST SERPL-CCNC: 20 U/L (ref 12–45)
BASOPHILS # BLD AUTO: 0.4 % (ref 0–1.8)
BASOPHILS # BLD: 0.04 K/UL (ref 0–0.12)
BILIRUB SERPL-MCNC: 0.3 MG/DL (ref 0.1–1.5)
BUN SERPL-MCNC: 11 MG/DL (ref 8–22)
CALCIUM ALBUM COR SERPL-MCNC: 8.8 MG/DL (ref 8.5–10.5)
CALCIUM SERPL-MCNC: 8.8 MG/DL (ref 8.5–10.5)
CHLORIDE SERPL-SCNC: 105 MMOL/L (ref 96–112)
CO2 SERPL-SCNC: 20 MMOL/L (ref 20–33)
CREAT SERPL-MCNC: 0.71 MG/DL (ref 0.5–1.4)
EOSINOPHIL # BLD AUTO: 0.12 K/UL (ref 0–0.51)
EOSINOPHIL NFR BLD: 1.1 % (ref 0–6.9)
ERYTHROCYTE [DISTWIDTH] IN BLOOD BY AUTOMATED COUNT: 41.5 FL (ref 35.9–50)
FLUAV RNA SPEC QL NAA+PROBE: NEGATIVE
FLUBV RNA SPEC QL NAA+PROBE: NEGATIVE
GFR SERPLBLD CREATININE-BSD FMLA CKD-EPI: 118 ML/MIN/1.73 M 2
GLOBULIN SER CALC-MCNC: 3 G/DL (ref 1.9–3.5)
GLUCOSE SERPL-MCNC: 138 MG/DL (ref 65–99)
HCT VFR BLD AUTO: 47.9 % (ref 42–52)
HGB BLD-MCNC: 16.6 G/DL (ref 14–18)
IMM GRANULOCYTES # BLD AUTO: 0.06 K/UL (ref 0–0.11)
IMM GRANULOCYTES NFR BLD AUTO: 0.6 % (ref 0–0.9)
LYMPHOCYTES # BLD AUTO: 3.32 K/UL (ref 1–4.8)
LYMPHOCYTES NFR BLD: 31.7 % (ref 22–41)
MCH RBC QN AUTO: 30.2 PG (ref 27–33)
MCHC RBC AUTO-ENTMCNC: 34.7 G/DL (ref 32.3–36.5)
MCV RBC AUTO: 87.2 FL (ref 81.4–97.8)
MONOCYTES # BLD AUTO: 0.66 K/UL (ref 0–0.85)
MONOCYTES NFR BLD AUTO: 6.3 % (ref 0–13.4)
NEUTROPHILS # BLD AUTO: 6.27 K/UL (ref 1.82–7.42)
NEUTROPHILS NFR BLD: 59.9 % (ref 44–72)
NRBC # BLD AUTO: 0 K/UL
NRBC BLD-RTO: 0 /100 WBC (ref 0–0.2)
PLATELET # BLD AUTO: 252 K/UL (ref 164–446)
PMV BLD AUTO: 11.7 FL (ref 9–12.9)
POTASSIUM SERPL-SCNC: 4 MMOL/L (ref 3.6–5.5)
PROT SERPL-MCNC: 7 G/DL (ref 6–8.2)
RBC # BLD AUTO: 5.49 M/UL (ref 4.7–6.1)
RSV RNA SPEC QL NAA+PROBE: NEGATIVE
SARS-COV-2 RNA RESP QL NAA+PROBE: NEGATIVE
SODIUM SERPL-SCNC: 136 MMOL/L (ref 135–145)
WBC # BLD AUTO: 10.5 K/UL (ref 4.8–10.8)

## 2025-08-17 PROCEDURE — 70496 CT ANGIOGRAPHY HEAD: CPT

## 2025-08-17 PROCEDURE — 700105 HCHG RX REV CODE 258: Performed by: EMERGENCY MEDICINE

## 2025-08-17 PROCEDURE — 700117 HCHG RX CONTRAST REV CODE 255: Performed by: EMERGENCY MEDICINE

## 2025-08-17 PROCEDURE — 36415 COLL VENOUS BLD VENIPUNCTURE: CPT

## 2025-08-17 PROCEDURE — 96372 THER/PROPH/DIAG INJ SC/IM: CPT

## 2025-08-17 PROCEDURE — 87637 SARSCOV2&INF A&B&RSV AMP PRB: CPT | Performed by: EMERGENCY MEDICINE

## 2025-08-17 PROCEDURE — 96375 TX/PRO/DX INJ NEW DRUG ADDON: CPT

## 2025-08-17 PROCEDURE — 85025 COMPLETE CBC W/AUTO DIFF WBC: CPT

## 2025-08-17 PROCEDURE — 80053 COMPREHEN METABOLIC PANEL: CPT

## 2025-08-17 PROCEDURE — 96374 THER/PROPH/DIAG INJ IV PUSH: CPT

## 2025-08-17 PROCEDURE — 99284 EMERGENCY DEPT VISIT MOD MDM: CPT

## 2025-08-17 PROCEDURE — 700111 HCHG RX REV CODE 636 W/ 250 OVERRIDE (IP): Mod: JZ | Performed by: EMERGENCY MEDICINE

## 2025-08-17 RX ORDER — SUMATRIPTAN 6 MG/.5ML
6 INJECTION, SOLUTION SUBCUTANEOUS ONCE
Status: COMPLETED | OUTPATIENT
Start: 2025-08-17 | End: 2025-08-17

## 2025-08-17 RX ORDER — SODIUM CHLORIDE 9 MG/ML
1000 INJECTION, SOLUTION INTRAVENOUS ONCE
Status: COMPLETED | OUTPATIENT
Start: 2025-08-17 | End: 2025-08-17

## 2025-08-17 RX ORDER — KETOROLAC TROMETHAMINE 15 MG/ML
15 INJECTION, SOLUTION INTRAMUSCULAR; INTRAVENOUS ONCE
Status: COMPLETED | OUTPATIENT
Start: 2025-08-17 | End: 2025-08-17

## 2025-08-17 RX ORDER — DIPHENHYDRAMINE HYDROCHLORIDE 50 MG/ML
25 INJECTION, SOLUTION INTRAMUSCULAR; INTRAVENOUS ONCE
Status: COMPLETED | OUTPATIENT
Start: 2025-08-17 | End: 2025-08-17

## 2025-08-17 RX ORDER — SUMATRIPTAN SUCCINATE 25 MG/1
50 TABLET ORAL
Qty: 10 TABLET | Refills: 3 | Status: SHIPPED | OUTPATIENT
Start: 2025-08-17

## 2025-08-17 RX ORDER — NAPROXEN 500 MG/1
500 TABLET ORAL 2 TIMES DAILY WITH MEALS
Qty: 60 TABLET | Refills: 0 | Status: SHIPPED | OUTPATIENT
Start: 2025-08-17

## 2025-08-17 RX ORDER — DEXAMETHASONE SODIUM PHOSPHATE 10 MG/ML
10 INJECTION, SOLUTION INTRAMUSCULAR; INTRAVENOUS ONCE
Status: COMPLETED | OUTPATIENT
Start: 2025-08-17 | End: 2025-08-17

## 2025-08-17 RX ORDER — METOCLOPRAMIDE HYDROCHLORIDE 5 MG/ML
10 INJECTION INTRAMUSCULAR; INTRAVENOUS ONCE
Status: COMPLETED | OUTPATIENT
Start: 2025-08-17 | End: 2025-08-17

## 2025-08-17 RX ADMIN — SODIUM CHLORIDE 1000 ML: 9 INJECTION, SOLUTION INTRAVENOUS at 10:34

## 2025-08-17 RX ADMIN — DIPHENHYDRAMINE HYDROCHLORIDE 25 MG: 50 INJECTION, SOLUTION INTRAMUSCULAR; INTRAVENOUS at 10:36

## 2025-08-17 RX ADMIN — DEXAMETHASONE SODIUM PHOSPHATE 10 MG: 10 INJECTION, SOLUTION INTRAMUSCULAR; INTRAVENOUS at 10:38

## 2025-08-17 RX ADMIN — KETOROLAC TROMETHAMINE 15 MG: 15 INJECTION, SOLUTION INTRAMUSCULAR; INTRAVENOUS at 10:37

## 2025-08-17 RX ADMIN — IOHEXOL 80 ML: 350 INJECTION, SOLUTION INTRAVENOUS at 11:52

## 2025-08-17 RX ADMIN — METOCLOPRAMIDE HYDROCHLORIDE 10 MG: 5 INJECTION INTRAMUSCULAR; INTRAVENOUS at 10:43

## 2025-08-17 RX ADMIN — SUMATRIPTAN 6 MG: 6 INJECTION, SOLUTION SUBCUTANEOUS at 10:42

## 2025-08-17 ASSESSMENT — FIBROSIS 4 INDEX: FIB4 SCORE: 0.67
